# Patient Record
Sex: FEMALE | Race: WHITE | NOT HISPANIC OR LATINO | Employment: FULL TIME | URBAN - METROPOLITAN AREA
[De-identification: names, ages, dates, MRNs, and addresses within clinical notes are randomized per-mention and may not be internally consistent; named-entity substitution may affect disease eponyms.]

---

## 2017-01-25 ENCOUNTER — GENERIC CONVERSION - ENCOUNTER (OUTPATIENT)
Dept: OTHER | Facility: OTHER | Age: 47
End: 2017-01-25

## 2017-02-10 ENCOUNTER — GENERIC CONVERSION - ENCOUNTER (OUTPATIENT)
Dept: OTHER | Facility: OTHER | Age: 47
End: 2017-02-10

## 2017-06-24 ENCOUNTER — APPOINTMENT (OUTPATIENT)
Dept: LAB | Facility: HOSPITAL | Age: 47
End: 2017-06-24
Payer: COMMERCIAL

## 2017-06-24 ENCOUNTER — TRANSCRIBE ORDERS (OUTPATIENT)
Dept: LAB | Facility: HOSPITAL | Age: 47
End: 2017-06-24

## 2017-06-24 DIAGNOSIS — Z00.8 HEALTH EXAMINATION IN POPULATION SURVEY: Primary | ICD-10-CM

## 2017-06-24 DIAGNOSIS — Z00.8 HEALTH EXAMINATION IN POPULATION SURVEY: ICD-10-CM

## 2017-06-24 LAB
CHOLEST SERPL-MCNC: 234 MG/DL (ref 50–200)
EST. AVERAGE GLUCOSE BLD GHB EST-MCNC: 114 MG/DL
HBA1C MFR BLD: 5.6 % (ref 4.2–6.3)
HDLC SERPL-MCNC: 90 MG/DL (ref 40–60)
LDLC SERPL CALC-MCNC: 131 MG/DL (ref 0–100)
TRIGL SERPL-MCNC: 66 MG/DL

## 2017-06-24 PROCEDURE — 80061 LIPID PANEL: CPT

## 2017-06-24 PROCEDURE — 83036 HEMOGLOBIN GLYCOSYLATED A1C: CPT

## 2017-06-24 PROCEDURE — 36415 COLL VENOUS BLD VENIPUNCTURE: CPT

## 2017-07-17 ENCOUNTER — OFFICE VISIT (OUTPATIENT)
Dept: URGENT CARE | Facility: MEDICAL CENTER | Age: 47
End: 2017-07-17
Payer: COMMERCIAL

## 2017-07-17 DIAGNOSIS — R10.9 ABDOMINAL PAIN: ICD-10-CM

## 2017-07-17 PROCEDURE — S9088 SERVICES PROVIDED IN URGENT: HCPCS

## 2017-07-17 PROCEDURE — 99213 OFFICE O/P EST LOW 20 MIN: CPT

## 2017-07-17 PROCEDURE — 96372 THER/PROPH/DIAG INJ SC/IM: CPT

## 2017-07-17 PROCEDURE — 81002 URINALYSIS NONAUTO W/O SCOPE: CPT

## 2017-07-18 ENCOUNTER — APPOINTMENT (OUTPATIENT)
Dept: LAB | Facility: HOSPITAL | Age: 47
End: 2017-07-18
Payer: COMMERCIAL

## 2017-07-18 DIAGNOSIS — R10.9 ABDOMINAL PAIN: ICD-10-CM

## 2017-07-18 PROCEDURE — 87086 URINE CULTURE/COLONY COUNT: CPT

## 2017-07-19 LAB — BACTERIA UR CULT: NORMAL

## 2017-09-01 ENCOUNTER — ALLSCRIPTS OFFICE VISIT (OUTPATIENT)
Dept: OTHER | Facility: OTHER | Age: 47
End: 2017-09-01

## 2017-09-28 DIAGNOSIS — Z12.31 ENCOUNTER FOR SCREENING MAMMOGRAM FOR MALIGNANT NEOPLASM OF BREAST: ICD-10-CM

## 2017-09-28 DIAGNOSIS — N20.0 CALCULUS OF KIDNEY: ICD-10-CM

## 2017-10-02 ENCOUNTER — TRANSCRIBE ORDERS (OUTPATIENT)
Dept: ADMINISTRATIVE | Facility: HOSPITAL | Age: 47
End: 2017-10-02

## 2017-10-02 DIAGNOSIS — Z12.31 VISIT FOR SCREENING MAMMOGRAM: Primary | ICD-10-CM

## 2017-10-20 ENCOUNTER — TRANSCRIBE ORDERS (OUTPATIENT)
Dept: ADMINISTRATIVE | Facility: HOSPITAL | Age: 47
End: 2017-10-20

## 2017-10-20 DIAGNOSIS — N20.0 URIC ACID NEPHROLITHIASIS: Primary | ICD-10-CM

## 2017-11-08 ENCOUNTER — HOSPITAL ENCOUNTER (OUTPATIENT)
Dept: RADIOLOGY | Facility: HOSPITAL | Age: 47
Discharge: HOME | End: 2017-11-08
Payer: COMMERCIAL

## 2017-11-08 DIAGNOSIS — Z12.31 ENCOUNTER FOR SCREENING MAMMOGRAM FOR MALIGNANT NEOPLASM OF BREAST: ICD-10-CM

## 2017-11-08 PROCEDURE — G0202 SCR MAMMO BI INCL CAD: HCPCS

## 2017-11-09 ENCOUNTER — GENERIC CONVERSION - ENCOUNTER (OUTPATIENT)
Dept: OTHER | Facility: OTHER | Age: 47
End: 2017-11-09

## 2017-11-11 ENCOUNTER — HOSPITAL ENCOUNTER (OUTPATIENT)
Dept: RADIOLOGY | Facility: HOSPITAL | Age: 47
Discharge: HOME | End: 2017-11-11
Attending: UROLOGY
Payer: COMMERCIAL

## 2017-11-11 ENCOUNTER — TRANSCRIBE ORDERS (OUTPATIENT)
Dept: RADIOLOGY | Facility: HOSPITAL | Age: 47
End: 2017-11-11

## 2017-11-11 DIAGNOSIS — N20.0 CALCULUS OF KIDNEY: ICD-10-CM

## 2017-11-11 PROCEDURE — 74000 HB X-RAY EXAM OF ABDOMEN (SINGLE ANTEROPOSTERIOR VIEW): CPT

## 2017-11-11 PROCEDURE — 76770 US EXAM ABDO BACK WALL COMP: CPT

## 2017-11-30 ENCOUNTER — ALLSCRIPTS OFFICE VISIT (OUTPATIENT)
Dept: OTHER | Facility: OTHER | Age: 47
End: 2017-11-30

## 2017-12-30 ENCOUNTER — APPOINTMENT (OUTPATIENT)
Dept: LAB | Facility: HOSPITAL | Age: 47
End: 2017-12-30
Payer: COMMERCIAL

## 2017-12-30 ENCOUNTER — TRANSCRIBE ORDERS (OUTPATIENT)
Dept: LAB | Facility: HOSPITAL | Age: 47
End: 2017-12-30

## 2017-12-30 DIAGNOSIS — N20.0 CALCULUS OF KIDNEY: ICD-10-CM

## 2017-12-30 LAB
ANION GAP SERPL CALCULATED.3IONS-SCNC: 6 MMOL/L (ref 4–13)
BUN SERPL-MCNC: 20 MG/DL (ref 5–25)
CALCIUM SERPL-MCNC: 8.7 MG/DL (ref 8.3–10.1)
CHLORIDE SERPL-SCNC: 108 MMOL/L (ref 100–108)
CO2 SERPL-SCNC: 26 MMOL/L (ref 21–32)
CREAT SERPL-MCNC: 0.8 MG/DL (ref 0.6–1.3)
GFR SERPL CREATININE-BSD FRML MDRD: 87 ML/MIN/1.73SQ M
GLUCOSE SERPL-MCNC: 80 MG/DL (ref 65–140)
POTASSIUM SERPL-SCNC: 4 MMOL/L (ref 3.5–5.3)
SODIUM SERPL-SCNC: 140 MMOL/L (ref 136–145)

## 2017-12-30 PROCEDURE — 80048 BASIC METABOLIC PNL TOTAL CA: CPT

## 2017-12-30 PROCEDURE — 36415 COLL VENOUS BLD VENIPUNCTURE: CPT

## 2018-01-05 ENCOUNTER — GENERIC CONVERSION - ENCOUNTER (OUTPATIENT)
Dept: INTERNAL MEDICINE CLINIC | Facility: CLINIC | Age: 48
End: 2018-01-05

## 2018-01-06 ENCOUNTER — APPOINTMENT (OUTPATIENT)
Dept: LAB | Facility: HOSPITAL | Age: 48
End: 2018-01-06
Payer: COMMERCIAL

## 2018-01-06 ENCOUNTER — TRANSCRIBE ORDERS (OUTPATIENT)
Dept: LAB | Facility: HOSPITAL | Age: 48
End: 2018-01-06

## 2018-01-06 DIAGNOSIS — Z79.899 NEED FOR PROPHYLACTIC CHEMOTHERAPY: ICD-10-CM

## 2018-01-06 DIAGNOSIS — L20.81 DIFFUSE NEURODERMATITIS OF BROCQ: ICD-10-CM

## 2018-01-06 DIAGNOSIS — L20.81 DIFFUSE NEURODERMATITIS OF BROCQ: Primary | ICD-10-CM

## 2018-01-06 LAB
ALBUMIN SERPL BCP-MCNC: 3.9 G/DL (ref 3.5–5)
ALP SERPL-CCNC: 46 U/L (ref 46–116)
ALT SERPL W P-5'-P-CCNC: 17 U/L (ref 12–78)
ANION GAP SERPL CALCULATED.3IONS-SCNC: 6 MMOL/L (ref 4–13)
AST SERPL W P-5'-P-CCNC: 17 U/L (ref 5–45)
BASOPHILS # BLD AUTO: 0.03 THOUSANDS/ΜL (ref 0–0.1)
BASOPHILS NFR BLD AUTO: 0 % (ref 0–1)
BILIRUB SERPL-MCNC: 0.48 MG/DL (ref 0.2–1)
BUN SERPL-MCNC: 14 MG/DL (ref 5–25)
CALCIUM SERPL-MCNC: 9.2 MG/DL (ref 8.3–10.1)
CHLORIDE SERPL-SCNC: 108 MMOL/L (ref 100–108)
CHOLEST SERPL-MCNC: 270 MG/DL (ref 50–200)
CO2 SERPL-SCNC: 27 MMOL/L (ref 21–32)
CREAT SERPL-MCNC: 0.72 MG/DL (ref 0.6–1.3)
EOSINOPHIL # BLD AUTO: 0.14 THOUSAND/ΜL (ref 0–0.61)
EOSINOPHIL NFR BLD AUTO: 1 % (ref 0–6)
ERYTHROCYTE [DISTWIDTH] IN BLOOD BY AUTOMATED COUNT: 12.6 % (ref 11.6–15.1)
GFR SERPL CREATININE-BSD FRML MDRD: 99 ML/MIN/1.73SQ M
GLUCOSE P FAST SERPL-MCNC: 87 MG/DL (ref 65–99)
HCT VFR BLD AUTO: 41.7 % (ref 34.8–46.1)
HDLC SERPL-MCNC: 105 MG/DL (ref 40–60)
HGB BLD-MCNC: 13.6 G/DL (ref 11.5–15.4)
LDLC SERPL CALC-MCNC: 156 MG/DL (ref 0–100)
LYMPHOCYTES # BLD AUTO: 1.94 THOUSANDS/ΜL (ref 0.6–4.47)
LYMPHOCYTES NFR BLD AUTO: 18 % (ref 14–44)
MAGNESIUM SERPL-MCNC: 2.2 MG/DL (ref 1.6–2.6)
MCH RBC QN AUTO: 32.5 PG (ref 26.8–34.3)
MCHC RBC AUTO-ENTMCNC: 32.6 G/DL (ref 31.4–37.4)
MCV RBC AUTO: 100 FL (ref 82–98)
MONOCYTES # BLD AUTO: 0.81 THOUSAND/ΜL (ref 0.17–1.22)
MONOCYTES NFR BLD AUTO: 7 % (ref 4–12)
NEUTROPHILS # BLD AUTO: 8.12 THOUSANDS/ΜL (ref 1.85–7.62)
NEUTS SEG NFR BLD AUTO: 74 % (ref 43–75)
NRBC BLD AUTO-RTO: 0 /100 WBCS
PLATELET # BLD AUTO: 307 THOUSANDS/UL (ref 149–390)
PMV BLD AUTO: 10.1 FL (ref 8.9–12.7)
POTASSIUM SERPL-SCNC: 4.2 MMOL/L (ref 3.5–5.3)
PROT SERPL-MCNC: 7.5 G/DL (ref 6.4–8.2)
RBC # BLD AUTO: 4.18 MILLION/UL (ref 3.81–5.12)
SODIUM SERPL-SCNC: 141 MMOL/L (ref 136–145)
TRIGL SERPL-MCNC: 43 MG/DL
URATE SERPL-MCNC: 2.4 MG/DL (ref 2–6.8)
WBC # BLD AUTO: 11.06 THOUSAND/UL (ref 4.31–10.16)

## 2018-01-06 PROCEDURE — 84550 ASSAY OF BLOOD/URIC ACID: CPT

## 2018-01-06 PROCEDURE — 85025 COMPLETE CBC W/AUTO DIFF WBC: CPT

## 2018-01-06 PROCEDURE — 80053 COMPREHEN METABOLIC PANEL: CPT

## 2018-01-06 PROCEDURE — 80061 LIPID PANEL: CPT

## 2018-01-06 PROCEDURE — 36415 COLL VENOUS BLD VENIPUNCTURE: CPT

## 2018-01-06 PROCEDURE — 83735 ASSAY OF MAGNESIUM: CPT

## 2018-01-12 VITALS
DIASTOLIC BLOOD PRESSURE: 78 MMHG | RESPIRATION RATE: 18 BRPM | HEART RATE: 80 BPM | OXYGEN SATURATION: 98 % | SYSTOLIC BLOOD PRESSURE: 112 MMHG

## 2018-01-13 VITALS
BODY MASS INDEX: 25.11 KG/M2 | HEIGHT: 66 IN | DIASTOLIC BLOOD PRESSURE: 80 MMHG | SYSTOLIC BLOOD PRESSURE: 122 MMHG | WEIGHT: 156.25 LBS

## 2018-01-16 NOTE — RESULT NOTES
Message   Notified the patient mammogram negative, see OB/GYN for routine examination follow up as scheduled        Verified Results  [facility] SCREENING BILATERAL W CAD [id number] 10:52AM Almaz Briones Order Number: [id number]     Test Name Result Flag Reference   MAMMO SCREENING BILATERAL W CAD (Report)     Patient History:   Family history of breast cancer in maternal cousin at age 35 and    prostate cancer in maternal uncle at age 77  Patient is a former smoker  Patient's BMI is 23 9  Reason for exam: screening (asymptomatic)  Mammo Screening Bilateral W CAD: July 25, 2016 - Check In #:    [de-identified]   Bilateral CC and MLO view(s) were taken  Technologist: RT Earnestine(R)(M)   Prior study comparison: June 25, 2015, bilateral digital    screening mammogram, performed at [place] [address]  June 9, 2014, bilateral digital screening mammogram    performed at [address] [place] [address]  June 4, 2013,    bilateral digital screening mammogram performed at [address] [place] [address]  May 25, 2012, bilateral digital screening    mammogram performed at [address] [place] [address]  May 20,    2011, bilateral digital screening mammogram performed at [address] [place] [address]  May 17, 2010, bilateral digital    screening mammogram performed at [address] [place] [address]      There are scattered fibroglandular densities  No dominant soft tissue mass, architectural distortion or    suspicious calcifications are noted  The skin and nipple    contours are within normal limits  No evidence of malignancy  No significant changes   when compared with prior studies  ASSESSMENT: BiRad:1 - Negative     Recommendation:   Routine screening mammogram of both breasts in 1 year  A reminder letter will be scheduled  Analyzed by CAD     8-10% of cancers will be missed on mammography   Management of a    palpable abnormality must be based on clinical grounds  Patients   will be notified of their results via letter from our facility  Accredited by [employer] and FDA       Transcription Location: [facility] 98: [id number]     Risk Value(s):   Tyrer-Cuzick 10 Year: 3 017%, Tyrer-Cuzick Lifetime: 15 343%,    Myriad Table: 2 6%, DUY 5 Year: 1 1%, NCI Lifetime: 11 8%   Signed by:   Jenaro Willams MD   7/25/16       Signatures   Electronically signed by : Sarah Chandler DO; Jul 26 2016  7:05PM EST                       (Author)

## 2018-01-18 NOTE — RESULT NOTES
Message   Notify the patient mammogram - no mammographic evidence of malignancy - negative follow-up as scheduled and see OBGYN for routine examination        Verified Results  * MAMMO SCREENING BILATERAL W CAD [id number] 01:02PM Avani Bansal Order Number: [id number]    - Patient Instructions: To schedule this appointment, please contact Central Scheduling at [phone]  Do not wear any perfume, powder, lotion or deodorant on breast or underarm area  Please bring your doctors order, referral (if needed) and insurance information with you on the day of the test  Failure to bring this information may result in this test being rescheduled  Arrive 15 minutes prior to your appointment time to register  On the day of your test, please bring any prior mammogram or breast studies with you that were not performed at a [facility]  Failure to bring prior exams may result in your test needing to be rescheduled  Test Name Result Flag Reference   MAMMO SCREENING BILATERAL W CAD (Report)     Patient History:   Family history of prostate cancer at age 77 in maternal uncle,    breast cancer at age 35 in maternal cousin  Patient is a former smoker  Patient's BMI is 23 9  Reason for exam: screening, asymptomatic  Mammo Screening Bilateral W CAD: November 8, 2017 - Check In #:    [de-identified]   Bilateral CC and MLO view(s) were taken  Technologist: RT Mahamed(R)(M)   Prior study comparison: July 25, 2016, mammo screening bilateral    W CAD performed at [address] [place] [address]  June 25, 2015, bilateral digital screening mammogram, performed at [address] [place] [address]  June 9, 2014, bilateral    digital screening mammogram performed at [address] [place] [address]      The breast tissue is heterogeneously dense, potentially limiting    the sensitivity of mammography   Patient risk, included in this    report, assists in determining the appropriate screening regimen    (such as 3-D mammography or the inclusion of automated breast    ultrasound or MRI)  3-D mammography may also remain indicated as    screening  The parenchymal pattern appears stable  No dominant soft tissue    mass or suspicious calcifications are noted  The skin and nipple   contours are within normal limits  No mammographic evidence of malignancy  No    significant changes when compared with prior studies  ACR BI-RADSï¾® Assessments: BiRad:1 - Negative     Recommendation:   Routine screening mammogram in 1 year  Analyzed by CAD     The patient is scheduled in a reminder system for screening    mammography  8-10% of cancers will be missed on mammography  Management of a    palpable abnormality must be based on clinical grounds  Patients   will be notified of their results via letter from our facility  Accredited by [employer] and FDA       Transcription Location: [facility] 98: [id number]     Risk Value(s):   Tyrer-Cuzick 10 Year: 3 200%, Tyrer-Cuzick Lifetime: 14 600%,    Myriad Table: 2 6%, DUY 5 Year: 1 1%, NCI Lifetime: 11 4%   Signed by:   Tiffanie Tariq MD   11/9/17

## 2018-02-04 ENCOUNTER — TRANSCRIBE ORDERS (OUTPATIENT)
Dept: LAB | Facility: HOSPITAL | Age: 48
End: 2018-02-04

## 2018-02-04 ENCOUNTER — APPOINTMENT (OUTPATIENT)
Dept: LAB | Facility: HOSPITAL | Age: 48
End: 2018-02-04
Payer: COMMERCIAL

## 2018-02-04 DIAGNOSIS — L20.81 DIFFUSE NEURODERMATITIS OF BROCQ: Primary | ICD-10-CM

## 2018-02-04 DIAGNOSIS — Z79.899 NEED FOR PROPHYLACTIC CHEMOTHERAPY: ICD-10-CM

## 2018-02-04 DIAGNOSIS — L20.81 DIFFUSE NEURODERMATITIS OF BROCQ: ICD-10-CM

## 2018-02-04 LAB
ALBUMIN SERPL BCP-MCNC: 3.8 G/DL (ref 3.5–5)
ALP SERPL-CCNC: 42 U/L (ref 46–116)
ALT SERPL W P-5'-P-CCNC: 15 U/L (ref 12–78)
ANION GAP SERPL CALCULATED.3IONS-SCNC: 7 MMOL/L (ref 4–13)
AST SERPL W P-5'-P-CCNC: 19 U/L (ref 5–45)
BASOPHILS # BLD AUTO: 0.04 THOUSANDS/ΜL (ref 0–0.1)
BASOPHILS NFR BLD AUTO: 1 % (ref 0–1)
BILIRUB SERPL-MCNC: 0.8 MG/DL (ref 0.2–1)
BUN SERPL-MCNC: 14 MG/DL (ref 5–25)
CALCIUM SERPL-MCNC: 8.8 MG/DL (ref 8.3–10.1)
CHLORIDE SERPL-SCNC: 109 MMOL/L (ref 100–108)
CHOLEST SERPL-MCNC: 272 MG/DL (ref 50–200)
CO2 SERPL-SCNC: 23 MMOL/L (ref 21–32)
CREAT SERPL-MCNC: 0.79 MG/DL (ref 0.6–1.3)
EOSINOPHIL # BLD AUTO: 0.21 THOUSAND/ΜL (ref 0–0.61)
EOSINOPHIL NFR BLD AUTO: 4 % (ref 0–6)
ERYTHROCYTE [DISTWIDTH] IN BLOOD BY AUTOMATED COUNT: 12.6 % (ref 11.6–15.1)
GFR SERPL CREATININE-BSD FRML MDRD: 89 ML/MIN/1.73SQ M
GLUCOSE P FAST SERPL-MCNC: 90 MG/DL (ref 65–99)
HCT VFR BLD AUTO: 41.1 % (ref 34.8–46.1)
HDLC SERPL-MCNC: 93 MG/DL (ref 40–60)
HGB BLD-MCNC: 13.5 G/DL (ref 11.5–15.4)
LDLC SERPL CALC-MCNC: 165 MG/DL (ref 0–100)
LYMPHOCYTES # BLD AUTO: 1.49 THOUSANDS/ΜL (ref 0.6–4.47)
LYMPHOCYTES NFR BLD AUTO: 28 % (ref 14–44)
MAGNESIUM SERPL-MCNC: 2 MG/DL (ref 1.6–2.6)
MCH RBC QN AUTO: 32.8 PG (ref 26.8–34.3)
MCHC RBC AUTO-ENTMCNC: 32.8 G/DL (ref 31.4–37.4)
MCV RBC AUTO: 100 FL (ref 82–98)
MONOCYTES # BLD AUTO: 0.35 THOUSAND/ΜL (ref 0.17–1.22)
MONOCYTES NFR BLD AUTO: 7 % (ref 4–12)
NEUTROPHILS # BLD AUTO: 3.18 THOUSANDS/ΜL (ref 1.85–7.62)
NEUTS SEG NFR BLD AUTO: 60 % (ref 43–75)
NRBC BLD AUTO-RTO: 0 /100 WBCS
PLATELET # BLD AUTO: 292 THOUSANDS/UL (ref 149–390)
PMV BLD AUTO: 10.2 FL (ref 8.9–12.7)
POTASSIUM SERPL-SCNC: 4.2 MMOL/L (ref 3.5–5.3)
PROT SERPL-MCNC: 7.1 G/DL (ref 6.4–8.2)
RBC # BLD AUTO: 4.12 MILLION/UL (ref 3.81–5.12)
SODIUM SERPL-SCNC: 139 MMOL/L (ref 136–145)
TRIGL SERPL-MCNC: 68 MG/DL
URATE SERPL-MCNC: 3.3 MG/DL (ref 2–6.8)
WBC # BLD AUTO: 5.29 THOUSAND/UL (ref 4.31–10.16)

## 2018-02-04 PROCEDURE — 85025 COMPLETE CBC W/AUTO DIFF WBC: CPT

## 2018-02-04 PROCEDURE — 36415 COLL VENOUS BLD VENIPUNCTURE: CPT

## 2018-02-04 PROCEDURE — 84550 ASSAY OF BLOOD/URIC ACID: CPT

## 2018-02-04 PROCEDURE — 83735 ASSAY OF MAGNESIUM: CPT

## 2018-02-04 PROCEDURE — 80061 LIPID PANEL: CPT

## 2018-02-04 PROCEDURE — 80053 COMPREHEN METABOLIC PANEL: CPT

## 2018-02-06 DIAGNOSIS — B00.9 HSV-1 (HERPES SIMPLEX VIRUS 1) INFECTION: Primary | ICD-10-CM

## 2018-02-07 RX ORDER — ACYCLOVIR 400 MG/1
1 TABLET ORAL 3 TIMES DAILY PRN
COMMUNITY
Start: 2012-10-10 | End: 2018-02-07 | Stop reason: SDUPTHER

## 2018-02-07 RX ORDER — ACYCLOVIR 400 MG/1
400 TABLET ORAL 3 TIMES DAILY PRN
Qty: 270 TABLET | Refills: 0 | Status: SHIPPED | OUTPATIENT
Start: 2018-02-07 | End: 2018-09-26 | Stop reason: ALTCHOICE

## 2018-02-13 ENCOUNTER — TELEPHONE (OUTPATIENT)
Dept: INTERNAL MEDICINE CLINIC | Facility: CLINIC | Age: 48
End: 2018-02-13

## 2018-02-13 RX ORDER — FLUTICASONE PROPIONATE 50 MCG
2 SPRAY, SUSPENSION (ML) NASAL DAILY
COMMUNITY
Start: 2014-04-15

## 2018-02-13 RX ORDER — ALPRAZOLAM 0.25 MG/1
1 TABLET ORAL 3 TIMES DAILY PRN
COMMUNITY
Start: 2017-09-01 | End: 2018-10-09 | Stop reason: SDUPTHER

## 2018-02-13 RX ORDER — ACETAMINOPHEN 160 MG
2 TABLET,DISINTEGRATING ORAL DAILY
COMMUNITY

## 2018-02-13 RX ORDER — CITALOPRAM 40 MG/1
1 TABLET ORAL DAILY
COMMUNITY
Start: 2017-03-22 | End: 2018-03-24 | Stop reason: SDUPTHER

## 2018-02-13 RX ORDER — TOPIRAMATE 100 MG/1
1 TABLET, FILM COATED ORAL
COMMUNITY
Start: 2013-03-11 | End: 2018-03-31 | Stop reason: SDUPTHER

## 2018-02-14 NOTE — TELEPHONE ENCOUNTER
Spoke to patient and the pharmacy regarding this medication  The patient has been getting Acyclovir 400mg TID from us until just recently  She was prescribed Valcyclovir 400mg one daily according to the pharmacist  I left a message for the patient to confirm this and advised her to call back to let us know that this prescription was filled by Dr Blanca Schrader and if she wants to change it then to [facility]

## 2018-02-18 ENCOUNTER — APPOINTMENT (OUTPATIENT)
Dept: LAB | Facility: HOSPITAL | Age: 48
End: 2018-02-18
Payer: COMMERCIAL

## 2018-02-18 ENCOUNTER — TRANSCRIBE ORDERS (OUTPATIENT)
Dept: LAB | Facility: HOSPITAL | Age: 48
End: 2018-02-18

## 2018-02-18 DIAGNOSIS — L20.89 OTHER ATOPIC DERMATITIS: Primary | ICD-10-CM

## 2018-02-18 DIAGNOSIS — Z79.899 LONG TERM USE OF DRUG: ICD-10-CM

## 2018-02-18 LAB
ALBUMIN SERPL BCP-MCNC: 3.8 G/DL (ref 3.5–5)
ALP SERPL-CCNC: 43 U/L (ref 46–116)
ALT SERPL W P-5'-P-CCNC: 11 U/L (ref 12–78)
ANION GAP SERPL CALCULATED.3IONS-SCNC: 7 MMOL/L (ref 4–13)
AST SERPL W P-5'-P-CCNC: 18 U/L (ref 5–45)
BASOPHILS # BLD AUTO: 0.05 THOUSANDS/ΜL (ref 0–0.1)
BASOPHILS NFR BLD AUTO: 1 % (ref 0–1)
BILIRUB SERPL-MCNC: 1.11 MG/DL (ref 0.2–1)
BUN SERPL-MCNC: 20 MG/DL (ref 5–25)
CALCIUM SERPL-MCNC: 8.7 MG/DL (ref 8.3–10.1)
CHLORIDE SERPL-SCNC: 109 MMOL/L (ref 100–108)
CHOLEST SERPL-MCNC: 261 MG/DL (ref 50–200)
CO2 SERPL-SCNC: 25 MMOL/L (ref 21–32)
CREAT SERPL-MCNC: 0.96 MG/DL (ref 0.6–1.3)
EOSINOPHIL # BLD AUTO: 0.13 THOUSAND/ΜL (ref 0–0.61)
EOSINOPHIL NFR BLD AUTO: 3 % (ref 0–6)
ERYTHROCYTE [DISTWIDTH] IN BLOOD BY AUTOMATED COUNT: 12.5 % (ref 11.6–15.1)
GFR SERPL CREATININE-BSD FRML MDRD: 70 ML/MIN/1.73SQ M
GLUCOSE P FAST SERPL-MCNC: 84 MG/DL (ref 65–99)
HCT VFR BLD AUTO: 40.6 % (ref 34.8–46.1)
HDLC SERPL-MCNC: 83 MG/DL (ref 40–60)
HGB BLD-MCNC: 13.8 G/DL (ref 11.5–15.4)
LDLC SERPL CALC-MCNC: 165 MG/DL (ref 0–100)
LYMPHOCYTES # BLD AUTO: 1.58 THOUSANDS/ΜL (ref 0.6–4.47)
LYMPHOCYTES NFR BLD AUTO: 32 % (ref 14–44)
MCH RBC QN AUTO: 33 PG (ref 26.8–34.3)
MCHC RBC AUTO-ENTMCNC: 34 G/DL (ref 31.4–37.4)
MCV RBC AUTO: 97 FL (ref 82–98)
MONOCYTES # BLD AUTO: 0.48 THOUSAND/ΜL (ref 0.17–1.22)
MONOCYTES NFR BLD AUTO: 10 % (ref 4–12)
NEUTROPHILS # BLD AUTO: 2.62 THOUSANDS/ΜL (ref 1.85–7.62)
NEUTS SEG NFR BLD AUTO: 54 % (ref 43–75)
NRBC BLD AUTO-RTO: 0 /100 WBCS
PLATELET # BLD AUTO: 291 THOUSANDS/UL (ref 149–390)
PMV BLD AUTO: 10.8 FL (ref 8.9–12.7)
POTASSIUM SERPL-SCNC: 4.3 MMOL/L (ref 3.5–5.3)
PROT SERPL-MCNC: 7.4 G/DL (ref 6.4–8.2)
RBC # BLD AUTO: 4.18 MILLION/UL (ref 3.81–5.12)
SODIUM SERPL-SCNC: 141 MMOL/L (ref 136–145)
TRIGL SERPL-MCNC: 63 MG/DL
WBC # BLD AUTO: 4.87 THOUSAND/UL (ref 4.31–10.16)

## 2018-02-18 PROCEDURE — 80053 COMPREHEN METABOLIC PANEL: CPT

## 2018-02-18 PROCEDURE — 36415 COLL VENOUS BLD VENIPUNCTURE: CPT

## 2018-02-18 PROCEDURE — 80061 LIPID PANEL: CPT

## 2018-02-18 PROCEDURE — 85025 COMPLETE CBC W/AUTO DIFF WBC: CPT

## 2018-03-06 ENCOUNTER — APPOINTMENT (OUTPATIENT)
Dept: LAB | Facility: HOSPITAL | Age: 48
End: 2018-03-06
Payer: COMMERCIAL

## 2018-03-06 ENCOUNTER — TRANSCRIBE ORDERS (OUTPATIENT)
Dept: LAB | Facility: HOSPITAL | Age: 48
End: 2018-03-06

## 2018-03-06 DIAGNOSIS — Z79.899 NEED FOR PROPHYLACTIC CHEMOTHERAPY: ICD-10-CM

## 2018-03-06 DIAGNOSIS — L20.81 DIFFUSE NEURODERMATITIS OF BROCQ: Primary | ICD-10-CM

## 2018-03-06 DIAGNOSIS — L20.81 DIFFUSE NEURODERMATITIS OF BROCQ: ICD-10-CM

## 2018-03-06 LAB
ALBUMIN SERPL BCP-MCNC: 4 G/DL (ref 3.5–5)
ALP SERPL-CCNC: 47 U/L (ref 46–116)
ALT SERPL W P-5'-P-CCNC: 13 U/L (ref 12–78)
ANION GAP SERPL CALCULATED.3IONS-SCNC: 9 MMOL/L (ref 4–13)
AST SERPL W P-5'-P-CCNC: 16 U/L (ref 5–45)
BASOPHILS # BLD AUTO: 0.06 THOUSANDS/ΜL (ref 0–0.1)
BASOPHILS NFR BLD AUTO: 1 % (ref 0–1)
BILIRUB SERPL-MCNC: 0.7 MG/DL (ref 0.2–1)
BUN SERPL-MCNC: 17 MG/DL (ref 5–25)
CALCIUM SERPL-MCNC: 9.4 MG/DL (ref 8.3–10.1)
CHLORIDE SERPL-SCNC: 110 MMOL/L (ref 100–108)
CHOLEST SERPL-MCNC: 262 MG/DL (ref 50–200)
CO2 SERPL-SCNC: 22 MMOL/L (ref 21–32)
CREAT SERPL-MCNC: 0.87 MG/DL (ref 0.6–1.3)
EOSINOPHIL # BLD AUTO: 0.21 THOUSAND/ΜL (ref 0–0.61)
EOSINOPHIL NFR BLD AUTO: 3 % (ref 0–6)
ERYTHROCYTE [DISTWIDTH] IN BLOOD BY AUTOMATED COUNT: 12.8 % (ref 11.6–15.1)
GFR SERPL CREATININE-BSD FRML MDRD: 79 ML/MIN/1.73SQ M
GLUCOSE P FAST SERPL-MCNC: 99 MG/DL (ref 65–99)
HCT VFR BLD AUTO: 42.9 % (ref 34.8–46.1)
HDLC SERPL-MCNC: 84 MG/DL (ref 40–60)
HGB BLD-MCNC: 14.1 G/DL (ref 11.5–15.4)
LDLC SERPL CALC-MCNC: 163 MG/DL (ref 0–100)
LYMPHOCYTES # BLD AUTO: 1.72 THOUSANDS/ΜL (ref 0.6–4.47)
LYMPHOCYTES NFR BLD AUTO: 25 % (ref 14–44)
MAGNESIUM SERPL-MCNC: 2.2 MG/DL (ref 1.6–2.6)
MCH RBC QN AUTO: 32.8 PG (ref 26.8–34.3)
MCHC RBC AUTO-ENTMCNC: 32.9 G/DL (ref 31.4–37.4)
MCV RBC AUTO: 100 FL (ref 82–98)
MONOCYTES # BLD AUTO: 0.83 THOUSAND/ΜL (ref 0.17–1.22)
MONOCYTES NFR BLD AUTO: 12 % (ref 4–12)
NEUTROPHILS # BLD AUTO: 4.02 THOUSANDS/ΜL (ref 1.85–7.62)
NEUTS SEG NFR BLD AUTO: 59 % (ref 43–75)
NRBC BLD AUTO-RTO: 0 /100 WBCS
PLATELET # BLD AUTO: 285 THOUSANDS/UL (ref 149–390)
PMV BLD AUTO: 10.5 FL (ref 8.9–12.7)
POTASSIUM SERPL-SCNC: 4.4 MMOL/L (ref 3.5–5.3)
PROT SERPL-MCNC: 7.6 G/DL (ref 6.4–8.2)
RBC # BLD AUTO: 4.3 MILLION/UL (ref 3.81–5.12)
SODIUM SERPL-SCNC: 141 MMOL/L (ref 136–145)
TRIGL SERPL-MCNC: 77 MG/DL
URATE SERPL-MCNC: 3.8 MG/DL (ref 2–6.8)
WBC # BLD AUTO: 6.86 THOUSAND/UL (ref 4.31–10.16)

## 2018-03-06 PROCEDURE — 85025 COMPLETE CBC W/AUTO DIFF WBC: CPT

## 2018-03-06 PROCEDURE — 84550 ASSAY OF BLOOD/URIC ACID: CPT

## 2018-03-06 PROCEDURE — 83735 ASSAY OF MAGNESIUM: CPT

## 2018-03-06 PROCEDURE — 80061 LIPID PANEL: CPT

## 2018-03-06 PROCEDURE — 36415 COLL VENOUS BLD VENIPUNCTURE: CPT

## 2018-03-06 PROCEDURE — 80053 COMPREHEN METABOLIC PANEL: CPT

## 2018-03-24 DIAGNOSIS — F32.A DEPRESSION, UNSPECIFIED DEPRESSION TYPE: Primary | ICD-10-CM

## 2018-03-24 RX ORDER — CITALOPRAM 40 MG/1
TABLET ORAL
Qty: 90 TABLET | Refills: 0 | Status: SHIPPED | OUTPATIENT
Start: 2018-03-24 | End: 2018-06-20 | Stop reason: SDUPTHER

## 2018-03-31 DIAGNOSIS — G43.809 OTHER MIGRAINE WITHOUT STATUS MIGRAINOSUS, NOT INTRACTABLE: Primary | ICD-10-CM

## 2018-04-01 RX ORDER — TOPIRAMATE 100 MG/1
TABLET, FILM COATED ORAL
Qty: 90 TABLET | Refills: 0 | Status: SHIPPED | OUTPATIENT
Start: 2018-04-01 | End: 2018-07-01 | Stop reason: SDUPTHER

## 2018-04-15 ENCOUNTER — APPOINTMENT (OUTPATIENT)
Dept: LAB | Facility: HOSPITAL | Age: 48
End: 2018-04-15
Payer: COMMERCIAL

## 2018-04-15 ENCOUNTER — TRANSCRIBE ORDERS (OUTPATIENT)
Dept: LAB | Facility: HOSPITAL | Age: 48
End: 2018-04-15

## 2018-04-15 DIAGNOSIS — Z79.899 ENCOUNTER FOR LONG-TERM (CURRENT) USE OF MEDICATIONS: Primary | ICD-10-CM

## 2018-04-15 DIAGNOSIS — Z79.899 ENCOUNTER FOR LONG-TERM (CURRENT) USE OF MEDICATIONS: ICD-10-CM

## 2018-04-15 DIAGNOSIS — L20.89 FLEXURAL ATOPIC DERMATITIS: ICD-10-CM

## 2018-04-15 LAB
ALBUMIN SERPL BCP-MCNC: 3.8 G/DL (ref 3.5–5)
ALP SERPL-CCNC: 47 U/L (ref 46–116)
ALT SERPL W P-5'-P-CCNC: 11 U/L (ref 12–78)
ANION GAP SERPL CALCULATED.3IONS-SCNC: 5 MMOL/L (ref 4–13)
AST SERPL W P-5'-P-CCNC: 14 U/L (ref 5–45)
BASOPHILS # BLD AUTO: 0.04 THOUSANDS/ΜL (ref 0–0.1)
BASOPHILS NFR BLD AUTO: 1 % (ref 0–1)
BILIRUB SERPL-MCNC: 1.05 MG/DL (ref 0.2–1)
BUN SERPL-MCNC: 19 MG/DL (ref 5–25)
CALCIUM SERPL-MCNC: 8.6 MG/DL
CHLORIDE SERPL-SCNC: 111 MMOL/L (ref 100–108)
CHOLEST SERPL-MCNC: 231 MG/DL (ref 50–200)
CO2 SERPL-SCNC: 25 MMOL/L (ref 21–32)
CREAT SERPL-MCNC: 0.87 MG/DL (ref 0.6–1.3)
EOSINOPHIL # BLD AUTO: 0.18 THOUSAND/ΜL (ref 0–0.61)
EOSINOPHIL NFR BLD AUTO: 3 % (ref 0–6)
ERYTHROCYTE [DISTWIDTH] IN BLOOD BY AUTOMATED COUNT: 12.7 % (ref 11.6–15.1)
GFR SERPL CREATININE-BSD FRML MDRD: 79 ML/MIN/1.73SQ M
GLUCOSE P FAST SERPL-MCNC: 91 MG/DL (ref 65–99)
HCT VFR BLD AUTO: 42.5 % (ref 34.8–46.1)
HDLC SERPL-MCNC: 80 MG/DL (ref 40–60)
HGB BLD-MCNC: 13.8 G/DL (ref 11.5–15.4)
LDLC SERPL CALC-MCNC: 139 MG/DL (ref 0–100)
LYMPHOCYTES # BLD AUTO: 1.36 THOUSANDS/ΜL (ref 0.6–4.47)
LYMPHOCYTES NFR BLD AUTO: 26 % (ref 14–44)
MAGNESIUM SERPL-MCNC: 2.1 MG/DL (ref 1.6–2.6)
MCH RBC QN AUTO: 32.9 PG (ref 26.8–34.3)
MCHC RBC AUTO-ENTMCNC: 32.5 G/DL (ref 31.4–37.4)
MCV RBC AUTO: 101 FL (ref 82–98)
MONOCYTES # BLD AUTO: 0.36 THOUSAND/ΜL (ref 0.17–1.22)
MONOCYTES NFR BLD AUTO: 7 % (ref 4–12)
NEUTROPHILS # BLD AUTO: 3.31 THOUSANDS/ΜL (ref 1.85–7.62)
NEUTS SEG NFR BLD AUTO: 63 % (ref 43–75)
NONHDLC SERPL-MCNC: 151 MG/DL
NRBC BLD AUTO-RTO: 0 /100 WBCS
PLATELET # BLD AUTO: 272 THOUSANDS/UL (ref 149–390)
PMV BLD AUTO: 10 FL (ref 8.9–12.7)
POTASSIUM SERPL-SCNC: 4.5 MMOL/L (ref 3.5–5.3)
PROT SERPL-MCNC: 7.2 G/DL (ref 6.4–8.2)
RBC # BLD AUTO: 4.19 MILLION/UL (ref 3.81–5.12)
SODIUM SERPL-SCNC: 141 MMOL/L (ref 136–145)
TRIGL SERPL-MCNC: 58 MG/DL
URATE SERPL-MCNC: 3.6 MG/DL (ref 2–6.8)
WBC # BLD AUTO: 5.26 THOUSAND/UL (ref 4.31–10.16)

## 2018-04-15 PROCEDURE — 84550 ASSAY OF BLOOD/URIC ACID: CPT

## 2018-04-15 PROCEDURE — 83735 ASSAY OF MAGNESIUM: CPT

## 2018-04-15 PROCEDURE — 80053 COMPREHEN METABOLIC PANEL: CPT

## 2018-04-15 PROCEDURE — 80061 LIPID PANEL: CPT

## 2018-04-15 PROCEDURE — 85025 COMPLETE CBC W/AUTO DIFF WBC: CPT

## 2018-04-15 PROCEDURE — 36415 COLL VENOUS BLD VENIPUNCTURE: CPT

## 2018-05-30 DIAGNOSIS — N20.0 CALCULUS OF KIDNEY: ICD-10-CM

## 2018-06-02 ENCOUNTER — APPOINTMENT (OUTPATIENT)
Dept: LAB | Facility: HOSPITAL | Age: 48
End: 2018-06-02
Payer: COMMERCIAL

## 2018-06-02 ENCOUNTER — TRANSCRIBE ORDERS (OUTPATIENT)
Dept: RADIOLOGY | Facility: HOSPITAL | Age: 48
End: 2018-06-02

## 2018-06-02 ENCOUNTER — HOSPITAL ENCOUNTER (OUTPATIENT)
Dept: RADIOLOGY | Facility: HOSPITAL | Age: 48
Discharge: HOME | End: 2018-06-02
Payer: COMMERCIAL

## 2018-06-02 DIAGNOSIS — Z00.8 HEALTH EXAMINATION IN POPULATION SURVEY: ICD-10-CM

## 2018-06-02 DIAGNOSIS — M25.50 PAIN IN JOINT, MULTIPLE SITES: Primary | ICD-10-CM

## 2018-06-02 DIAGNOSIS — M25.50 PAIN IN JOINT, MULTIPLE SITES: ICD-10-CM

## 2018-06-02 DIAGNOSIS — M54.5 LOW BACK PAIN, UNSPECIFIED BACK PAIN LATERALITY, UNSPECIFIED CHRONICITY, WITH SCIATICA PRESENCE UNSPECIFIED: ICD-10-CM

## 2018-06-02 DIAGNOSIS — N20.0 CALCULUS OF KIDNEY: ICD-10-CM

## 2018-06-02 LAB
25(OH)D3 SERPL-MCNC: 46.1 NG/ML (ref 30–100)
ALBUMIN SERPL BCP-MCNC: 4.3 G/DL (ref 3.5–5)
ALP SERPL-CCNC: 50 U/L (ref 46–116)
ALT SERPL W P-5'-P-CCNC: 14 U/L (ref 12–78)
ANION GAP SERPL CALCULATED.3IONS-SCNC: 6 MMOL/L (ref 4–13)
AST SERPL W P-5'-P-CCNC: 18 U/L (ref 5–45)
BASOPHILS # BLD AUTO: 0.06 THOUSANDS/ΜL (ref 0–0.1)
BASOPHILS NFR BLD AUTO: 1 % (ref 0–1)
BILIRUB SERPL-MCNC: 1.09 MG/DL (ref 0.2–1)
BILIRUB UR QL STRIP: NEGATIVE
BUN SERPL-MCNC: 14 MG/DL (ref 5–25)
C3 SERPL-MCNC: 93.6 MG/DL (ref 90–180)
C4 SERPL-MCNC: 19 MG/DL (ref 10–40)
CALCIUM SERPL-MCNC: 9.2 MG/DL (ref 8.3–10.1)
CHLORIDE SERPL-SCNC: 106 MMOL/L (ref 100–108)
CHOLEST SERPL-MCNC: 240 MG/DL (ref 50–200)
CK SERPL-CCNC: 81 U/L (ref 26–192)
CLARITY UR: NORMAL
CO2 SERPL-SCNC: 26 MMOL/L (ref 21–32)
COLOR UR: YELLOW
CREAT SERPL-MCNC: 0.86 MG/DL (ref 0.6–1.3)
CRP SERPL QL: <3 MG/L
EOSINOPHIL # BLD AUTO: 0.26 THOUSAND/ΜL (ref 0–0.61)
EOSINOPHIL NFR BLD AUTO: 3 % (ref 0–6)
ERYTHROCYTE [DISTWIDTH] IN BLOOD BY AUTOMATED COUNT: 12 % (ref 11.6–15.1)
ERYTHROCYTE [SEDIMENTATION RATE] IN BLOOD: 11 MM/HOUR (ref 0–20)
EST. AVERAGE GLUCOSE BLD GHB EST-MCNC: 111 MG/DL
GFR SERPL CREATININE-BSD FRML MDRD: 80 ML/MIN/1.73SQ M
GLUCOSE P FAST SERPL-MCNC: 83 MG/DL (ref 65–99)
GLUCOSE UR STRIP-MCNC: NEGATIVE MG/DL
HBA1C MFR BLD: 5.5 % (ref 4.2–6.3)
HBV SURFACE AG SER QL: NORMAL
HCT VFR BLD AUTO: 40.8 % (ref 34.8–46.1)
HDLC SERPL-MCNC: 94 MG/DL (ref 40–60)
HGB BLD-MCNC: 13.3 G/DL (ref 11.5–15.4)
HGB UR QL STRIP.AUTO: NEGATIVE
IMM GRANULOCYTES # BLD AUTO: 0.03 THOUSAND/UL (ref 0–0.2)
IMM GRANULOCYTES NFR BLD AUTO: 0 % (ref 0–2)
KETONES UR STRIP-MCNC: NEGATIVE MG/DL
LDLC SERPL CALC-MCNC: 135 MG/DL (ref 0–100)
LEUKOCYTE ESTERASE UR QL STRIP: NEGATIVE
LYMPHOCYTES # BLD AUTO: 1.77 THOUSANDS/ΜL (ref 0.6–4.47)
LYMPHOCYTES NFR BLD AUTO: 21 % (ref 14–44)
MCH RBC QN AUTO: 33.3 PG (ref 26.8–34.3)
MCHC RBC AUTO-ENTMCNC: 32.6 G/DL (ref 31.4–37.4)
MCV RBC AUTO: 102 FL (ref 82–98)
MONOCYTES # BLD AUTO: 0.68 THOUSAND/ΜL (ref 0.17–1.22)
MONOCYTES NFR BLD AUTO: 8 % (ref 4–12)
NEUTROPHILS # BLD AUTO: 5.81 THOUSANDS/ΜL (ref 1.85–7.62)
NEUTS SEG NFR BLD AUTO: 67 % (ref 43–75)
NITRITE UR QL STRIP: NEGATIVE
NONHDLC SERPL-MCNC: 146 MG/DL
NRBC BLD AUTO-RTO: 0 /100 WBCS
PH UR STRIP.AUTO: 6.5 [PH] (ref 4.5–8)
PLATELET # BLD AUTO: 272 THOUSANDS/UL (ref 149–390)
PMV BLD AUTO: 10.4 FL (ref 8.9–12.7)
POTASSIUM SERPL-SCNC: 3.6 MMOL/L (ref 3.5–5.3)
PROT SERPL-MCNC: 7.5 G/DL (ref 6.4–8.2)
PROT UR STRIP-MCNC: NEGATIVE MG/DL
RBC # BLD AUTO: 3.99 MILLION/UL (ref 3.81–5.12)
SODIUM SERPL-SCNC: 138 MMOL/L (ref 136–145)
SP GR UR STRIP.AUTO: 1 (ref 1–1.03)
TRIGL SERPL-MCNC: 55 MG/DL
TSH SERPL DL<=0.05 MIU/L-ACNC: 2.05 UIU/ML (ref 0.36–3.74)
UROBILINOGEN UR QL STRIP.AUTO: 0.2 E.U./DL
WBC # BLD AUTO: 8.61 THOUSAND/UL (ref 4.31–10.16)

## 2018-06-02 PROCEDURE — 86140 C-REACTIVE PROTEIN: CPT

## 2018-06-02 PROCEDURE — 82595 ASSAY OF CRYOGLOBULIN: CPT

## 2018-06-02 PROCEDURE — 83516 IMMUNOASSAY NONANTIBODY: CPT

## 2018-06-02 PROCEDURE — 85025 COMPLETE CBC W/AUTO DIFF WBC: CPT

## 2018-06-02 PROCEDURE — 74018 RADEX ABDOMEN 1 VIEW: CPT

## 2018-06-02 PROCEDURE — 86430 RHEUMATOID FACTOR TEST QUAL: CPT

## 2018-06-02 PROCEDURE — 84443 ASSAY THYROID STIM HORMONE: CPT

## 2018-06-02 PROCEDURE — 86225 DNA ANTIBODY NATIVE: CPT

## 2018-06-02 PROCEDURE — 81003 URINALYSIS AUTO W/O SCOPE: CPT | Performed by: INTERNAL MEDICINE

## 2018-06-02 PROCEDURE — 86200 CCP ANTIBODY: CPT

## 2018-06-02 PROCEDURE — 72050 X-RAY EXAM NECK SPINE 4/5VWS: CPT

## 2018-06-02 PROCEDURE — 86038 ANTINUCLEAR ANTIBODIES: CPT

## 2018-06-02 PROCEDURE — 87340 HEPATITIS B SURFACE AG IA: CPT

## 2018-06-02 PROCEDURE — 86160 COMPLEMENT ANTIGEN: CPT

## 2018-06-02 PROCEDURE — 83520 IMMUNOASSAY QUANT NOS NONAB: CPT

## 2018-06-02 PROCEDURE — 82550 ASSAY OF CK (CPK): CPT

## 2018-06-02 PROCEDURE — 76770 US EXAM ABDO BACK WALL COMP: CPT

## 2018-06-02 PROCEDURE — 72200 X-RAY EXAM SI JOINTS: CPT

## 2018-06-02 PROCEDURE — 85652 RBC SED RATE AUTOMATED: CPT

## 2018-06-02 PROCEDURE — 82306 VITAMIN D 25 HYDROXY: CPT

## 2018-06-02 PROCEDURE — 86256 FLUORESCENT ANTIBODY TITER: CPT

## 2018-06-02 PROCEDURE — 80053 COMPREHEN METABOLIC PANEL: CPT

## 2018-06-02 PROCEDURE — 80061 LIPID PANEL: CPT

## 2018-06-02 PROCEDURE — 73130 X-RAY EXAM OF HAND: CPT

## 2018-06-02 PROCEDURE — 86235 NUCLEAR ANTIGEN ANTIBODY: CPT

## 2018-06-02 PROCEDURE — 72110 X-RAY EXAM L-2 SPINE 4/>VWS: CPT

## 2018-06-02 PROCEDURE — 83036 HEMOGLOBIN GLYCOSYLATED A1C: CPT

## 2018-06-02 PROCEDURE — 84165 PROTEIN E-PHORESIS SERUM: CPT | Performed by: PATHOLOGY

## 2018-06-02 PROCEDURE — 81374 HLA I TYPING 1 ANTIGEN LR: CPT

## 2018-06-02 PROCEDURE — 84165 PROTEIN E-PHORESIS SERUM: CPT

## 2018-06-02 PROCEDURE — 36415 COLL VENOUS BLD VENIPUNCTURE: CPT

## 2018-06-04 LAB
CENTROMERE B AB SER-ACNC: <0.2 AI (ref 0–0.9)
DSDNA AB SER-ACNC: 1 IU/ML (ref 0–9)
ENA JO1 AB SER-ACNC: <0.2 AI (ref 0–0.9)
ENA RNP AB SER-ACNC: <0.2 AI (ref 0–0.9)
ENA SCL70 AB SER-ACNC: <0.2 AI (ref 0–0.9)
ENA SM AB SER-ACNC: <0.2 AI (ref 0–0.9)
ENA SS-A AB SER-ACNC: <0.2 AI (ref 0–0.9)
ENA SS-B AB SER-ACNC: <0.2 AI (ref 0–0.9)
MITOCHONDRIA M2 IGG SER-ACNC: 1 UNITS (ref 0–20)
RHEUMATOID FACT SER QL LA: NEGATIVE
RYE IGE QN: NEGATIVE
TTG IGA SER-ACNC: <2 U/ML (ref 0–3)
TTG IGG SER-ACNC: <2 U/ML (ref 0–5)

## 2018-06-05 LAB
CCP IGA+IGG SERPL IA-ACNC: 8 UNITS (ref 0–19)
MYELOPEROXIDASE AB SER IA-ACNC: <9 U/ML (ref 0–9)
PROTEINASE3 AB SER IA-ACNC: <3.5 U/ML (ref 0–3.5)

## 2018-06-06 LAB
ALBUMIN SERPL ELPH-MCNC: 4.68 G/DL (ref 3.5–5)
ALBUMIN SERPL ELPH-MCNC: 63.2 % (ref 52–65)
ALPHA1 GLOB SERPL ELPH-MCNC: 0.29 G/DL (ref 0.1–0.4)
ALPHA1 GLOB SERPL ELPH-MCNC: 3.9 % (ref 2.5–5)
ALPHA2 GLOB SERPL ELPH-MCNC: 0.69 G/DL (ref 0.4–1.2)
ALPHA2 GLOB SERPL ELPH-MCNC: 9.3 % (ref 7–13)
BETA GLOB ABNORMAL SERPL ELPH-MCNC: 0.41 G/DL (ref 0.4–0.8)
BETA1 GLOB SERPL ELPH-MCNC: 5.6 % (ref 5–13)
BETA2 GLOB SERPL ELPH-MCNC: 4.7 % (ref 2–8)
BETA2+GAMMA GLOB SERPL ELPH-MCNC: 0.35 G/DL (ref 0.2–0.5)
GAMMA GLOB ABNORMAL SERPL ELPH-MCNC: 0.98 G/DL (ref 0.5–1.6)
GAMMA GLOB SERPL ELPH-MCNC: 13.3 % (ref 12–22)
IGG/ALB SER: 1.72 {RATIO} (ref 1.1–1.8)
PROT PATTERN SERPL ELPH-IMP: NORMAL
PROT SERPL-MCNC: 7.4 G/DL (ref 6.4–8.2)

## 2018-06-08 LAB — CRYOGLOB SER QL 1D COLD INC: NORMAL

## 2018-06-11 LAB — HLA-B27 QL NAA+PROBE: NEGATIVE

## 2018-06-20 DIAGNOSIS — F32.A DEPRESSION, UNSPECIFIED DEPRESSION TYPE: ICD-10-CM

## 2018-06-20 RX ORDER — CITALOPRAM 40 MG/1
TABLET ORAL
Qty: 90 TABLET | Refills: 0 | Status: SHIPPED | OUTPATIENT
Start: 2018-06-20 | End: 2018-09-19 | Stop reason: SDUPTHER

## 2018-07-01 DIAGNOSIS — G43.809 OTHER MIGRAINE WITHOUT STATUS MIGRAINOSUS, NOT INTRACTABLE: ICD-10-CM

## 2018-07-01 RX ORDER — TOPIRAMATE 100 MG/1
TABLET, FILM COATED ORAL
Qty: 90 TABLET | Refills: 0 | Status: SHIPPED | OUTPATIENT
Start: 2018-07-01 | End: 2018-09-26 | Stop reason: SDUPTHER

## 2018-09-04 ENCOUNTER — TRANSCRIBE ORDERS (OUTPATIENT)
Dept: LAB | Facility: CLINIC | Age: 48
End: 2018-09-04

## 2018-09-04 ENCOUNTER — APPOINTMENT (OUTPATIENT)
Dept: LAB | Facility: CLINIC | Age: 48
End: 2018-09-04
Payer: COMMERCIAL

## 2018-09-04 DIAGNOSIS — Z78.0 MENOPAUSE: Primary | ICD-10-CM

## 2018-09-04 DIAGNOSIS — Z78.0 MENOPAUSE: ICD-10-CM

## 2018-09-04 LAB — FSH SERPL-ACNC: 8.9 MIU/ML

## 2018-09-04 PROCEDURE — 36415 COLL VENOUS BLD VENIPUNCTURE: CPT

## 2018-09-04 PROCEDURE — 83001 ASSAY OF GONADOTROPIN (FSH): CPT

## 2018-09-19 DIAGNOSIS — F32.A DEPRESSION, UNSPECIFIED DEPRESSION TYPE: ICD-10-CM

## 2018-09-19 RX ORDER — CITALOPRAM 40 MG/1
TABLET ORAL
Qty: 90 TABLET | Refills: 0 | Status: SHIPPED | OUTPATIENT
Start: 2018-09-19 | End: 2018-10-09 | Stop reason: SDUPTHER

## 2018-09-26 ENCOUNTER — OFFICE VISIT (OUTPATIENT)
Dept: INTERNAL MEDICINE CLINIC | Facility: CLINIC | Age: 48
End: 2018-09-26
Payer: COMMERCIAL

## 2018-09-26 VITALS
SYSTOLIC BLOOD PRESSURE: 124 MMHG | DIASTOLIC BLOOD PRESSURE: 86 MMHG | OXYGEN SATURATION: 98 % | WEIGHT: 164 LBS | RESPIRATION RATE: 16 BRPM | BODY MASS INDEX: 26.36 KG/M2 | HEART RATE: 80 BPM | HEIGHT: 66 IN

## 2018-09-26 DIAGNOSIS — E78.2 MIXED HYPERLIPIDEMIA: Primary | ICD-10-CM

## 2018-09-26 DIAGNOSIS — G43.809 OTHER MIGRAINE WITHOUT STATUS MIGRAINOSUS, NOT INTRACTABLE: ICD-10-CM

## 2018-09-26 DIAGNOSIS — E66.3 OVERWEIGHT (BMI 25.0-29.9): ICD-10-CM

## 2018-09-26 PROCEDURE — 99214 OFFICE O/P EST MOD 30 MIN: CPT | Performed by: NURSE PRACTITIONER

## 2018-09-26 RX ORDER — LUBIPROSTONE 24 UG/1
24 CAPSULE, GELATIN COATED ORAL
COMMUNITY
Start: 2018-08-30 | End: 2019-05-07 | Stop reason: SDUPTHER

## 2018-09-26 RX ORDER — ALPRAZOLAM 0.25 MG/1
0.25 TABLET ORAL 3 TIMES DAILY PRN
Qty: 30 TABLET | Refills: 0 | Status: CANCELLED | OUTPATIENT
Start: 2018-09-26

## 2018-09-26 RX ORDER — TOPIRAMATE 100 MG/1
100 TABLET, FILM COATED ORAL DAILY
Qty: 90 TABLET | Refills: 1 | Status: SHIPPED | OUTPATIENT
Start: 2018-09-26 | End: 2018-10-09 | Stop reason: SDUPTHER

## 2018-09-26 RX ORDER — VALACYCLOVIR HYDROCHLORIDE 500 MG/1
500 TABLET, FILM COATED ORAL DAILY
COMMUNITY
Start: 2018-09-06 | End: 2021-09-22 | Stop reason: SDUPTHER

## 2018-09-26 RX ORDER — DUPILUMAB 300 MG/2ML
INJECTION, SOLUTION SUBCUTANEOUS
COMMUNITY
Start: 2018-09-07 | End: 2022-04-19 | Stop reason: SDUPTHER

## 2018-09-26 NOTE — PROGRESS NOTES
Assessment/Plan:       Diagnoses and all orders for this visit:    Mixed hyperlipidemia    Other migraine without status migrainosus, not intractable  -     topiramate (TOPAMAX) 100 mg tablet; Take 1 tablet (100 mg total) by mouth daily    Overweight (BMI 25 0-29 9)  -     Ambulatory referral to Weight Management; Future    Other orders  -     valACYclovir (VALTREX) 500 mg tablet; Take 500 mg by mouth daily    -     AMITIZA 24 MCG capsule; Take 24 mcg by mouth daily with breakfast    -     DUPIXENT 300 MG/2ML SOSY;   -     Cancel: ALPRAZolam (XANAX) 0 25 mg tablet; Take 1 tablet (0 25 mg total) by mouth 3 (three) times a day as needed for anxiety        dw patient she should see a weight loss specialist and try their program first before going on a medication    Subjective:      Patient ID: Bell Wagner is a 49 y o  female  Pt is here for weightloss  Gained weight over the past few years    Used to be on weight watchers  Not being very strict with her diet  She is exercising    She wants to try qsymia        The following portions of the patient's history were reviewed and updated as appropriate: allergies, current medications, past family history, past medical history, past social history, past surgical history and problem list     Review of Systems   Constitutional: Negative  HENT: Negative  Eyes: Negative  Respiratory: Negative  Cardiovascular: Negative  Gastrointestinal: Negative  Musculoskeletal: Negative  Neurological: Negative  Family History   Problem Relation Age of Onset    Hypertension Mother     Colon cancer Father      Past Medical History:   Diagnosis Date    Asthma     Last assessed 9/25/2014    Eczema      Social History     Social History    Marital status: /Civil Union     Spouse name: N/A    Number of children: N/A    Years of education: N/A     Occupational History    Not on file       Social History Main Topics    Smoking status: Former Smoker    Smokeless tobacco: Not on file    Alcohol use Yes      Comment: Social    Drug use: Unknown    Sexual activity: Not on file     Other Topics Concern    Not on file     Social History Narrative    No narrative on file       Current Outpatient Prescriptions:     ALPRAZolam (XANAX) 0 25 mg tablet, Take 1 tablet by mouth 3 (three) times a day as needed, Disp: , Rfl:     AMITIZA 24 MCG capsule, Take 24 mcg by mouth daily with breakfast  , Disp: , Rfl:     Cholecalciferol (VITAMIN D3) 2000 units capsule, Take 2 capsules by mouth daily  , Disp: , Rfl:     citalopram (CeleXA) 40 mg tablet, TAKE ONE TABLET BY MOUTH DAILY, Disp: 90 tablet, Rfl: 0    DUPIXENT 300 MG/2ML SOSY, , Disp: , Rfl:     fluticasone (FLONASE) 50 mcg/act nasal spray, 2 sprays into each nostril daily, Disp: , Rfl:     topiramate (TOPAMAX) 100 mg tablet, Take 1 tablet (100 mg total) by mouth daily, Disp: 90 tablet, Rfl: 1    valACYclovir (VALTREX) 500 mg tablet, Take 500 mg by mouth daily  , Disp: , Rfl:   Allergies   Allergen Reactions    Other          Objective:      /86   Pulse 80   Resp 16   Ht 5' 6" (1 676 m)   Wt 74 4 kg (164 lb)   SpO2 98%   BMI 26 47 kg/m²          Physical Exam   Constitutional: She is oriented to person, place, and time  She appears well-developed and well-nourished  HENT:   Head: Normocephalic and atraumatic  Eyes: Pupils are equal, round, and reactive to light  Conjunctivae are normal    Neck: Normal range of motion  Neck supple  Cardiovascular: Normal rate and regular rhythm  Pulmonary/Chest: Effort normal and breath sounds normal    Abdominal: Soft  Bowel sounds are normal    Musculoskeletal: Normal range of motion  Neurological: She is alert and oriented to person, place, and time  Skin: Skin is warm and dry

## 2018-10-01 DIAGNOSIS — Z12.31 ENCOUNTER FOR SCREENING MAMMOGRAM FOR BREAST CANCER: Primary | ICD-10-CM

## 2018-10-09 ENCOUNTER — OFFICE VISIT (OUTPATIENT)
Dept: INTERNAL MEDICINE CLINIC | Facility: CLINIC | Age: 48
End: 2018-10-09
Payer: COMMERCIAL

## 2018-10-09 VITALS
HEART RATE: 75 BPM | SYSTOLIC BLOOD PRESSURE: 132 MMHG | OXYGEN SATURATION: 100 % | DIASTOLIC BLOOD PRESSURE: 84 MMHG | RESPIRATION RATE: 16 BRPM | WEIGHT: 167.8 LBS | HEIGHT: 66 IN | BODY MASS INDEX: 26.97 KG/M2

## 2018-10-09 DIAGNOSIS — R42 DIZZINESS: ICD-10-CM

## 2018-10-09 DIAGNOSIS — F41.9 ANXIETY: Primary | ICD-10-CM

## 2018-10-09 DIAGNOSIS — G43.809 OTHER MIGRAINE WITHOUT STATUS MIGRAINOSUS, NOT INTRACTABLE: ICD-10-CM

## 2018-10-09 DIAGNOSIS — R29.2 HYPERREFLEXIA: ICD-10-CM

## 2018-10-09 DIAGNOSIS — F32.A DEPRESSION, UNSPECIFIED DEPRESSION TYPE: ICD-10-CM

## 2018-10-09 PROBLEM — G43.909 MIGRAINE: Status: ACTIVE | Noted: 2018-10-09

## 2018-10-09 PROCEDURE — 99214 OFFICE O/P EST MOD 30 MIN: CPT | Performed by: INTERNAL MEDICINE

## 2018-10-09 RX ORDER — ALPRAZOLAM 0.25 MG/1
0.25 TABLET ORAL 3 TIMES DAILY PRN
Qty: 30 TABLET | Refills: 0 | Status: SHIPPED | OUTPATIENT
Start: 2018-10-09 | End: 2022-05-27 | Stop reason: SDUPTHER

## 2018-10-09 RX ORDER — CITALOPRAM 40 MG/1
40 TABLET ORAL DAILY
Qty: 90 TABLET | Refills: 0 | Status: SHIPPED | OUTPATIENT
Start: 2018-10-09 | End: 2018-12-27 | Stop reason: SDUPTHER

## 2018-10-09 RX ORDER — ALPRAZOLAM 0.25 MG/1
0.25 TABLET ORAL 3 TIMES DAILY PRN
Qty: 30 TABLET | Refills: 1 | Status: CANCELLED | OUTPATIENT
Start: 2018-10-09

## 2018-10-09 RX ORDER — TOPIRAMATE 100 MG/1
100 TABLET, FILM COATED ORAL DAILY
Qty: 90 TABLET | Refills: 0 | Status: SHIPPED | OUTPATIENT
Start: 2018-10-09 | End: 2019-06-12 | Stop reason: SDUPTHER

## 2018-10-09 NOTE — PROGRESS NOTES
Assessment/Plan:           Problem List Items Addressed This Visit        Cardiovascular and Mediastinum    Migraine    Relevant Medications    citalopram (CeleXA) 40 mg tablet    topiramate (TOPAMAX) 100 mg tablet       Other    Anxiety - Primary     Patient does appear to be anxious the no suicidal ideation we did request the pharmacist to give the previous many fracture for both the Celexa and also Topamax of her symptoms not improve we may need to change to a different SSRI  The patient did request refill of Xanax , we did check the PDMP         Relevant Medications    ALPRAZolam (XANAX) 0 25 mg tablet    Dizziness    Relevant Orders    Cortisol Level, AM Specimen    Luteinizing hormone    TSH, 3rd generation    Vitamin B12    Copper Level    Vitamin D 25 hydroxy    Heavy metals screen    MRI brain w wo contrast    Basic metabolic panel    Hyperreflexia     On examination the patient is hyper reflex, brain zaps, dizziness; I will check laboratories and MRI of the brain check vitamin B12 level, copper level, vitamin-D level         Relevant Orders    Vitamin B12    Copper Level    Vitamin D 25 hydroxy    Heavy metals screen    MRI brain w wo contrast    Basic metabolic panel    Depression    Relevant Medications    ALPRAZolam (XANAX) 0 25 mg tablet    citalopram (CeleXA) 40 mg tablet    Other Relevant Orders    Heavy metals screen          Return to office  1 month  call if any problems  Subjective:      Patient ID: Chucky Gross is a 49 y o  female      HPI 49year old female coming in with a chief complaint of dizziness, chills, trying, depression, brain status, change of medication; the pt reports brain zaps , seratonin discontinuation , the manufacture changed , light headed, dizzy , chills , sweating , crying not sad  Cried all weekend  No si no problems ; weight gain; gaining weight no fever, blurry , no thirst , no poly uria, no ha  No cofusion ,  No gi symptoms, no nausea, no vomiting the patient does report me she has had a total abdominal hysterectomy she is uncertain if she has undergone menopause  She reports me she has noticed the symptoms after a manufacture change her medications at 1st she thought it was  Celexa that was changed but after further conversation with the pharmacist it was her Topamax that changed manufacture  She reports me she did not change her diet and she did not change her exercise level  The following portions of the patient's history were reviewed and updated as appropriate: allergies, current medications, past family history, past medical history, past social history, past surgical history and problem list     Review of Systems   Constitutional: Positive for chills and unexpected weight change  Negative for activity change and appetite change  HENT: Negative for congestion and postnasal drip  Eyes: Negative for visual disturbance  Respiratory: Negative for cough and shortness of breath  Cardiovascular: Negative for chest pain, palpitations and leg swelling  Gastrointestinal: Negative for abdominal pain, diarrhea, nausea and vomiting  Neurological: Positive for dizziness  Negative for headaches  Hematological: Negative for adenopathy  Psychiatric/Behavioral: Negative for suicidal ideas  The patient is nervous/anxious  Objective:                    No Follow-up on file        Allergies   Allergen Reactions    Other        Past Medical History:   Diagnosis Date    Asthma     Last assessed 9/25/2014    Eczema      Past Surgical History:   Procedure Laterality Date    TONSILLECTOMY AND ADENOIDECTOMY       Current Outpatient Prescriptions on File Prior to Visit   Medication Sig Dispense Refill    AMITIZA 24 MCG capsule Take 24 mcg by mouth daily with breakfast        Cholecalciferol (VITAMIN D3) 2000 units capsule Take 2 capsules by mouth daily        DUPIXENT 300 MG/2ML SOSY       fluticasone (FLONASE) 50 mcg/act nasal spray 2 sprays into each nostril daily      valACYclovir (VALTREX) 500 mg tablet Take 500 mg by mouth daily        [DISCONTINUED] ALPRAZolam (XANAX) 0 25 mg tablet Take 1 tablet by mouth 3 (three) times a day as needed      [DISCONTINUED] citalopram (CeleXA) 40 mg tablet TAKE ONE TABLET BY MOUTH DAILY 90 tablet 0    [DISCONTINUED] topiramate (TOPAMAX) 100 mg tablet Take 1 tablet (100 mg total) by mouth daily 90 tablet 1     No current facility-administered medications on file prior to visit  Family History   Problem Relation Age of Onset    Hypertension Mother     Colon cancer Father      Social History     Social History    Marital status: /Civil Union     Spouse name: N/A    Number of children: N/A    Years of education: N/A     Occupational History    Not on file  Social History Main Topics    Smoking status: Former Smoker    Smokeless tobacco: Never Used    Alcohol use Yes      Comment: Social    Drug use: Unknown    Sexual activity: Not on file     Other Topics Concern    Not on file     Social History Narrative    No narrative on file     Vitals:    10/09/18 1133   BP: 132/84   BP Location: Right arm   Patient Position: Sitting   Cuff Size: Standard   Pulse: 75   Resp: 16   SpO2: 100%   Weight: 76 1 kg (167 lb 12 8 oz)   Height: 5' 6" (1 676 m)     Results for orders placed or performed in visit on 19/81/35   Follicle stimulating hormone   Result Value Ref Range    FSH 8 9 mIU/mL     Weight (last 2 days)     Date/Time   Weight    10/09/18 1133  76 1 (167 8)            Body mass index is 27 08 kg/m²    BP      Temp      Pulse     Resp      SpO2        Vitals:    10/09/18 1133   Weight: 76 1 kg (167 lb 12 8 oz)     Vitals:    10/09/18 1133   Weight: 76 1 kg (167 lb 12 8 oz)       /84 (BP Location: Right arm, Patient Position: Sitting, Cuff Size: Standard)   Pulse 75   Resp 16   Ht 5' 6" (1 676 m)   Wt 76 1 kg (167 lb 12 8 oz)   SpO2 100%   BMI 27 08 kg/m²       Cranial nerves 2 through 12 grossly intact, motor strength 5/5, DTRs 4 over 4 bilateral and symmetrical, eomi   Physical Exam   Constitutional: She appears well-developed and well-nourished  HENT:   Head: Normocephalic  Mouth/Throat: Oropharynx is clear and moist    Eyes: Pupils are equal, round, and reactive to light  Conjunctivae are normal  Right eye exhibits no discharge  Left eye exhibits no discharge  No scleral icterus  Neck: Neck supple  Cardiovascular: Normal rate, regular rhythm, normal heart sounds and intact distal pulses  Exam reveals no gallop and no friction rub  No murmur heard  Pulmonary/Chest: Breath sounds normal  No respiratory distress  She has no wheezes  She has no rales  Abdominal: Soft  Bowel sounds are normal  She exhibits no distension and no mass  There is no tenderness  There is no rebound and no guarding  Musculoskeletal: She exhibits no edema or deformity  Lymphadenopathy:     She has no cervical adenopathy  Neurological: She is alert   Coordination normal

## 2018-10-09 NOTE — ASSESSMENT & PLAN NOTE
On examination the patient is hyper reflex, brain zaps, dizziness; I will check laboratories and MRI of the brain check vitamin B12 level, copper level, vitamin-D level

## 2018-10-09 NOTE — TELEPHONE ENCOUNTER
PDMP completed [facility] 9/26/18, no other providers          09/24/2017  1  09/01/2017  ALPRAZOLAM 0 25 MG TABLET  10 0  3  [id number]  0

## 2018-10-09 NOTE — ASSESSMENT & PLAN NOTE
Patient does appear to be anxious the no suicidal ideation we did request the pharmacist to give the previous many fracture for both the Celexa and also Topamax of her symptoms not improve we may need to change to a different SSRI    The patient did request refill of Xanax , we did check the PDMP

## 2018-10-15 ENCOUNTER — APPOINTMENT (OUTPATIENT)
Dept: LAB | Facility: CLINIC | Age: 48
End: 2018-10-15
Payer: COMMERCIAL

## 2018-10-15 DIAGNOSIS — F32.A DEPRESSION, UNSPECIFIED DEPRESSION TYPE: ICD-10-CM

## 2018-10-15 DIAGNOSIS — R29.2 HYPERREFLEXIA: ICD-10-CM

## 2018-10-15 DIAGNOSIS — E53.8 LOW VITAMIN B12 LEVEL: Primary | ICD-10-CM

## 2018-10-15 DIAGNOSIS — R42 DIZZINESS: ICD-10-CM

## 2018-10-15 LAB
25(OH)D3 SERPL-MCNC: 64.6 NG/ML (ref 30–100)
ANION GAP SERPL CALCULATED.3IONS-SCNC: 5 MMOL/L (ref 4–13)
BUN SERPL-MCNC: 14 MG/DL (ref 5–25)
CALCIUM SERPL-MCNC: 8.6 MG/DL (ref 8.3–10.1)
CHLORIDE SERPL-SCNC: 107 MMOL/L (ref 100–108)
CO2 SERPL-SCNC: 24 MMOL/L (ref 21–32)
CORTIS AM PEAK SERPL-MCNC: 18.9 UG/DL (ref 4.2–22.4)
CREAT SERPL-MCNC: 0.91 MG/DL (ref 0.6–1.3)
GFR SERPL CREATININE-BSD FRML MDRD: 75 ML/MIN/1.73SQ M
GLUCOSE P FAST SERPL-MCNC: 99 MG/DL (ref 65–99)
HCYS SERPL-SCNC: 9.4 UMOL/L (ref 3.7–11.2)
LH SERPL-ACNC: 8.7 MIU/ML
POTASSIUM SERPL-SCNC: 4.1 MMOL/L (ref 3.5–5.3)
SODIUM SERPL-SCNC: 136 MMOL/L (ref 136–145)
TSH SERPL DL<=0.05 MIU/L-ACNC: 1.75 UIU/ML (ref 0.36–3.74)
VIT B12 SERPL-MCNC: 304 PG/ML (ref 100–900)

## 2018-10-15 PROCEDURE — 83090 ASSAY OF HOMOCYSTEINE: CPT

## 2018-10-15 PROCEDURE — 83655 ASSAY OF LEAD: CPT

## 2018-10-15 PROCEDURE — 83002 ASSAY OF GONADOTROPIN (LH): CPT

## 2018-10-15 PROCEDURE — 80048 BASIC METABOLIC PNL TOTAL CA: CPT

## 2018-10-15 PROCEDURE — 82306 VITAMIN D 25 HYDROXY: CPT

## 2018-10-15 PROCEDURE — 82525 ASSAY OF COPPER: CPT

## 2018-10-15 PROCEDURE — 82300 ASSAY OF CADMIUM: CPT

## 2018-10-15 PROCEDURE — 82533 TOTAL CORTISOL: CPT

## 2018-10-15 PROCEDURE — 82175 ASSAY OF ARSENIC: CPT

## 2018-10-15 PROCEDURE — 36415 COLL VENOUS BLD VENIPUNCTURE: CPT

## 2018-10-15 PROCEDURE — 83825 ASSAY OF MERCURY: CPT

## 2018-10-15 PROCEDURE — 84443 ASSAY THYROID STIM HORMONE: CPT

## 2018-10-15 PROCEDURE — 83918 ORGANIC ACIDS TOTAL QUANT: CPT

## 2018-10-15 PROCEDURE — 82607 VITAMIN B-12: CPT

## 2018-10-17 LAB — COPPER SERPL-MCNC: 110 UG/DL (ref 72–166)

## 2018-10-18 LAB
ARSENIC BLD-MCNC: 7 UG/L (ref 2–23)
CADMIUM BLD-MCNC: NORMAL UG/L (ref 0–1.2)
LEAD BLD-MCNC: NORMAL UG/DL (ref 0–4)
MERCURY BLD-MCNC: NORMAL UG/L (ref 0–14.9)
METHYLMALONATE SERPL-SCNC: 226 NMOL/L (ref 0–378)
SL AMB DISCLAIMER: NORMAL

## 2018-10-24 ENCOUNTER — HOSPITAL ENCOUNTER (OUTPATIENT)
Dept: RADIOLOGY | Facility: HOSPITAL | Age: 48
Discharge: HOME | End: 2018-10-24
Payer: COMMERCIAL

## 2018-10-24 DIAGNOSIS — R29.2 HYPERREFLEXIA: ICD-10-CM

## 2018-10-24 DIAGNOSIS — R42 DIZZINESS: ICD-10-CM

## 2018-10-24 PROCEDURE — 70553 MRI BRAIN STEM W/O & W/DYE: CPT

## 2018-10-24 PROCEDURE — A9585 GADOBUTROL INJECTION: HCPCS | Performed by: RADIOLOGY

## 2018-10-24 RX ADMIN — GADOBUTROL 7 ML: 604.72 INJECTION INTRAVENOUS at 21:00

## 2018-10-26 ENCOUNTER — OFFICE VISIT (OUTPATIENT)
Dept: INTERNAL MEDICINE CLINIC | Facility: CLINIC | Age: 48
End: 2018-10-26
Payer: COMMERCIAL

## 2018-10-26 VITALS
TEMPERATURE: 97.8 F | BODY MASS INDEX: 26.97 KG/M2 | HEART RATE: 78 BPM | HEIGHT: 66 IN | OXYGEN SATURATION: 99 % | SYSTOLIC BLOOD PRESSURE: 120 MMHG | WEIGHT: 167.8 LBS | DIASTOLIC BLOOD PRESSURE: 89 MMHG

## 2018-10-26 DIAGNOSIS — D32.9 MENINGIOMA (HCC): Primary | ICD-10-CM

## 2018-10-26 PROCEDURE — 99213 OFFICE O/P EST LOW 20 MIN: CPT | Performed by: INTERNAL MEDICINE

## 2018-10-26 NOTE — LETTER
To whom it may concern:    Abdi Elmore was seen in our office today  The patient may return to work on Monday October 29, 2018        Sincerely,           Lamont Peters, [address]

## 2018-10-28 PROBLEM — D32.9 MENINGIOMA (HCC): Status: ACTIVE | Noted: 2018-10-28

## 2018-10-28 NOTE — PROGRESS NOTES
Assessment/Plan:    [facility]  I did review the MRI in detail I have explained to the patient is a small meningioma likely asymptomatic although patient does have mild hyper reflexia I would like to get the opinion of neurosurgery          Problem List Items Addressed This Visit        Nervous and Auditory    Meningioma ([facility] ) - Primary     I did review the MRI in detail I have explained to the patient is a small meningioma likely asymptomatic although patient does have mild hyper reflexia I would like to get the opinion of neurosurgery          Relevant Orders    Ambulatory referral to Neurosurgery          Return to office  as scheduled  call if any problems  The patient does have a low normal vitamin B12 level but not true B12 deficiency I did review her laboratories with her she has started over-the-counter vitamin B12 1000 mcg once a day which I think is reasonable  Subjective:      Patient ID: April Kearns is a 49 y o  female  HPI  67-year-old female who is coming in at our request to review the MRI that does show a meningioma today we did review the MRI in detail she reports me that her mood has improved since going back to trade name Topamax  Overall she is feeling well no headaches, no weakness, no numbness or tingling no difficulty with memory overall she is feeling well  The following portions of the patient's history were reviewed and updated as appropriate: allergies, current medications, past family history, past medical history, past social history, past surgical history and problem list     Review of Systems   Constitutional: Negative for unexpected weight change  Neurological: Negative for dizziness, facial asymmetry, speech difficulty, light-headedness, numbness and headaches  Psychiatric/Behavioral: The patient is not nervous/anxious            Objective:      /89   Pulse 78   Temp 97 8 °F (36 6 °C)   Ht 5' 6" (1 676 m)   Wt 76 1 kg (167 lb 12 8 oz)   SpO2 99%   BMI 27 08 kg/m²                     No Follow-up on file  Allergies   Allergen Reactions    Other        Past Medical History:   Diagnosis Date    Asthma     Last assessed 9/25/2014    Eczema      Past Surgical History:   Procedure Laterality Date    TONSILLECTOMY AND ADENOIDECTOMY       Current Outpatient Prescriptions on File Prior to Visit   Medication Sig Dispense Refill    ALPRAZolam (XANAX) 0 25 mg tablet Take 1 tablet (0 25 mg total) by mouth 3 (three) times a day as needed for anxiety 30 tablet 0    AMITIZA 24 MCG capsule Take 24 mcg by mouth daily with breakfast        Cholecalciferol (VITAMIN D3) 2000 units capsule Take 2 capsules by mouth daily        citalopram (CeleXA) 40 mg tablet Take 1 tablet (40 mg total) by mouth daily 90 tablet 0    DUPIXENT 300 MG/2ML SOSY       fluticasone (FLONASE) 50 mcg/act nasal spray 2 sprays into each nostril daily      topiramate (TOPAMAX) 100 mg tablet Take 1 tablet (100 mg total) by mouth daily 90 tablet 0    valACYclovir (VALTREX) 500 mg tablet Take 500 mg by mouth daily         No current facility-administered medications on file prior to visit  Family History   Problem Relation Age of Onset    Hypertension Mother     Colon cancer Father      Social History     Social History    Marital status: /Civil Union     Spouse name: N/A    Number of children: N/A    Years of education: N/A     Occupational History    Not on file       Social History Main Topics    Smoking status: Former Smoker    Smokeless tobacco: Never Used    Alcohol use Yes      Comment: Social    Drug use: Unknown    Sexual activity: Not on file     Other Topics Concern    Not on file     Social History Narrative    No narrative on file     Vitals:    10/26/18 1513   BP: 120/89   Pulse: 78   Temp: 97 8 °F (36 6 °C)   SpO2: 99%   Weight: 76 1 kg (167 lb 12 8 oz)   Height: 5' 6" (1 676 m)     Results for orders placed or performed in visit on 10/15/18   Homocysteine, serum   Result Value Ref Range    Homocyst(e)ine, P/S 9 4 3 7 - 11 2 umol/L   Methylmalonic acid, serum   Result Value Ref Range    Methylmalonic Acid, S 226 0 - 378 nmol/L    Disclaimer: Comment      Weight (last 2 days)     Date/Time   Weight    10/26/18 1513  76 1 (167 8)            Body mass index is 27 08 kg/m²  BP      Temp      Pulse     Resp      SpO2        Vitals:    10/26/18 1513   Weight: 76 1 kg (167 lb 12 8 oz)     Vitals:    10/26/18 1513   Weight: 76 1 kg (167 lb 12 8 oz)        Physical Exam   Constitutional: She is oriented to person, place, and time  She appears well-developed and well-nourished  No distress  HENT:   Head: Normocephalic and atraumatic  Right Ear: External ear normal    Left Ear: External ear normal    Nose: Nose normal    Mouth/Throat: Oropharynx is clear and moist  No oropharyngeal exudate  Eyes: Pupils are equal, round, and reactive to light  Conjunctivae and EOM are normal  Right eye exhibits no discharge  Left eye exhibits no discharge  No scleral icterus  Cardiovascular: Normal heart sounds  No murmur heard  Pulmonary/Chest: No respiratory distress  She has no wheezes  She has no rales  Lymphadenopathy:     She has no cervical adenopathy  Neurological: She is alert and oriented to person, place, and time  No cranial nerve deficit or sensory deficit   Coordination and gait normal    Skin: She is not diaphoretic      motor strength 5/5, able to walk on a straight line heel to toe without any difficulty, finger to nose testing intact EOMI, hyperreflexia bilateral patellar reflex

## 2018-10-29 ENCOUNTER — TELEPHONE (OUTPATIENT)
Dept: NEUROSURGERY | Facility: CLINIC | Age: 48
End: 2018-10-29

## 2018-10-29 NOTE — TELEPHONE ENCOUNTER
Pt reports she spoke with someone earlier and was expecting a call re [facility]ANY[facility] appt  Explained the information she provided was sent to TANYA for review and once completed she would be contacted re appt  She stated an understanding

## 2018-11-06 ENCOUNTER — OFFICE VISIT (OUTPATIENT)
Dept: NEUROSURGERY | Facility: CLINIC | Age: 48
End: 2018-11-06
Payer: COMMERCIAL

## 2018-11-06 VITALS
TEMPERATURE: 97.8 F | DIASTOLIC BLOOD PRESSURE: 96 MMHG | HEART RATE: 76 BPM | WEIGHT: 169 LBS | BODY MASS INDEX: 27.16 KG/M2 | SYSTOLIC BLOOD PRESSURE: 138 MMHG | HEIGHT: 66 IN | RESPIRATION RATE: 16 BRPM

## 2018-11-06 DIAGNOSIS — M89.8X8 MASS OF SKULL: Primary | ICD-10-CM

## 2018-11-06 DIAGNOSIS — D32.9 MENINGIOMA (HCC): ICD-10-CM

## 2018-11-06 PROCEDURE — 99203 OFFICE O/P NEW LOW 30 MIN: CPT | Performed by: NEUROLOGICAL SURGERY

## 2018-11-06 NOTE — PROGRESS NOTES
Neurosurgery Office Note  Karen Grant is a 51 y o  female    Type of Visit: Consult        Diagnoses and all orders for this visit:    Mass of skull  -     MRI brain with and without contrast; Future    Meningioma ([facility] [address] )  -     Ambulatory referral to Neurosurgery  -     MRI brain with and without contrast; Future          DISCUSSION SUMMARY    This is a 48-year-old female with incidentally noted calcified mass in the vertex which is roughly unchanged  I believe this to be asymptomatic  There is no change in the parenchyma of the surrounding brain  Although this could be easily removed, I do not believe it is symptomatic and therefore there would be no change in the patient's overall quality of life by undergoing thid surgical procedure  CHIEF COMPLAINT  Patient presents for 13MM Meningioma, patient complains of "Brain Zaps", Dizzy Spells, Blurred vision  Diagnoses:       SX INFO      HISTORY OF PRESENT ILLNESS   patient  Reports some visual disturbance associated with blurred vision in all fields  She denies focal headaches   she has had no seizures   she has some diffuse neck pain   the symptoms do not interrupt her ability to sleep or concentrate      REVIEW OF SYSTEMS  Review of Systems   Constitutional: Negative  HENT: Negative  Eyes: Positive for visual disturbance  Blurred vision    Respiratory: Positive for wheezing (uses inhaler )  Cardiovascular: Negative  Gastrointestinal: Positive for constipation (uses amitiza )  Endocrine: Negative  Genitourinary: Negative  Musculoskeletal: Positive for neck pain (Back of neck )  Skin: Negative  Allergic/Immunologic: Positive for environmental allergies  Neurological: Positive for dizziness and headaches  Hematological: Negative  Psychiatric/Behavioral: The patient is nervous/anxious (anxiety since diagnosis )  All other systems reviewed and are negative        The patient's ROS was reviewed by MD JONES reviewed the ROS    Active Ambulatory Problems     Diagnosis Date Noted    Anxiety 10/09/2018    Dizziness 10/09/2018    Hyperreflexia 10/09/2018    Migraine 10/09/2018    Depression 10/09/2018    Meningioma ([address] ) 10/28/2018    Mass of skull 11/06/2018     Resolved Ambulatory Problems     Diagnosis Date Noted    No Resolved Ambulatory Problems     Past Medical History:   Diagnosis Date    Asthma     Eczema        Past Surgical History:   Procedure Laterality Date    TONSILLECTOMY AND ADENOIDECTOMY         History   Smoking Status    Former Smoker   Smokeless Tobacco    Never Used       History   Alcohol Use    Yes     Comment: Social       History   Drug use: Unknown       Vitals:    11/06/18 1540   BP: 138/96   BP Location: Left arm   Patient Position: Sitting   Cuff Size: Standard   Pulse: 76   Resp: 16   Temp: 97 8 °F (36 6 °C)   TempSrc: Tympanic   Weight: 76 7 kg (169 lb)   Height: 5' 6" (1 676 m)         Current Outpatient Prescriptions:     ALPRAZolam (XANAX) 0 25 mg tablet, Take 1 tablet (0 25 mg total) by mouth 3 (three) times a day as needed for anxiety (Patient taking differently: Take 0 25 mg by mouth as needed for anxiety  ), Disp: 30 tablet, Rfl: 0    AMITIZA 24 MCG capsule, Take 24 mcg by mouth daily with breakfast  , Disp: , Rfl:     Cholecalciferol (VITAMIN D3) 2000 units capsule, Take 2 capsules by mouth daily  , Disp: , Rfl:     citalopram (CeleXA) 40 mg tablet, Take 1 tablet (40 mg total) by mouth daily, Disp: 90 tablet, Rfl: 0    DUPIXENT 300 MG/2ML SOSY, , Disp: , Rfl:     fluticasone (FLONASE) 50 mcg/act nasal spray, 2 sprays into each nostril daily, Disp: , Rfl:     topiramate (TOPAMAX) 100 mg tablet, Take 1 tablet (100 mg total) by mouth daily, Disp: 90 tablet, Rfl: 0    valACYclovir (VALTREX) 500 mg tablet, Take 500 mg by mouth daily  , Disp: , Rfl:     The following portions of the patient's history were reviewed by MD and updated by MA as appropriate: allergies, current medications, past family history, past medical history, past social history, past surgical history and problem list       Physical Exam   Constitutional: She is oriented to person, place, and time  She appears well-developed  HENT:   Head: Normocephalic and atraumatic  Eyes: Pupils are equal, round, and reactive to light  EOM are normal    Neck: Normal range of motion  Neurological: She is alert and oriented to person, place, and time  She has normal strength  A cranial nerve deficit is present  She displays a negative Romberg sign  Coordination and gait abnormal  GCS eye subscore is 4  GCS verbal subscore is 5  GCS motor subscore is 6  Reflex Scores:       Tricep reflexes are 2+ on the right side and 2+ on the left side  Bicep reflexes are 2+ on the right side and 2+ on the left side  Brachioradialis reflexes are 2+ on the right side and 2+ on the left side  Patellar reflexes are 2+ on the right side and 2+ on the left side  Achilles reflexes are 2+ on the right side and 2+ on the left side          RESULTS/DATA  MRI of the brain demonstrates a high left convexity non enhancing calcified mass wich is very low signal on MRI long sequences consistent with a calcified mass or exostosis

## 2018-11-06 NOTE — LETTER
November 12, 2018     Caleb Oscar  [address] [facility] [address]    Patient: Ever Morales   YOB: 1970   Date of Visit: 11/6/2018       Dear Dr Hawkins First: Thank you for referring Ever Morales to me for evaluation  Below are my notes for this consultation  If you have questions, please do not hesitate to call me  I look forward to following your patient along with you  Sincerely,        Janeth Yates MD        CC: No Recipients  Janeth Yates MD  11/12/2018  6:01 AM  Sign at close encounter  Neurosurgery Office Note  Ever Morales is a 51 y o  female    Type of Visit: Consult        Diagnoses and all orders for this visit:    Mass of skull  -     MRI brain with and without contrast; Future    Meningioma [facility])  -     Ambulatory referral to Neurosurgery  -     MRI brain with and without contrast; Future          DISCUSSION SUMMARY    This is a 59-year-old female with incidentally noted calcified mass in the vertex which is roughly unchanged  I believe this to be asymptomatic  There is no change in the parenchyma of the surrounding brain  Although this could be easily removed, I do not believe it is symptomatic and therefore there would be no change in the patient's overall quality of life by undergoing thid surgical procedure  CHIEF COMPLAINT  Patient presents for 13MM Meningioma, patient complains of "Brain Zaps", Dizzy Spells, Blurred vision  Diagnoses:       SX INFO      HISTORY OF PRESENT ILLNESS   patient  Reports some visual disturbance associated with blurred vision in all fields  She denies focal headaches   she has had no seizures   she has some diffuse neck pain   the symptoms do not interrupt her ability to sleep or concentrate      REVIEW OF SYSTEMS  Review of Systems   Constitutional: Negative  HENT: Negative  Eyes: Positive for visual disturbance  Blurred vision    Respiratory: Positive for wheezing (uses inhaler )  Cardiovascular: Negative  Gastrointestinal: Positive for constipation (uses amitiza )  Endocrine: Negative  Genitourinary: Negative  Musculoskeletal: Positive for neck pain (Back of neck )  Skin: Negative  Allergic/Immunologic: Positive for environmental allergies  Neurological: Positive for dizziness and headaches  Hematological: Negative  Psychiatric/Behavioral: The patient is nervous/anxious (anxiety since diagnosis )  All other systems reviewed and are negative        The patient's ROS was reviewed by MD JONES reviewed the ROS    Active Ambulatory Problems     Diagnosis Date Noted    Anxiety 10/09/2018    Dizziness 10/09/2018    Hyperreflexia 10/09/2018    Migraine 10/09/2018    Depression 10/09/2018    Meningioma ([address] ) 10/28/2018    Mass of skull 11/06/2018     Resolved Ambulatory Problems     Diagnosis Date Noted    No Resolved Ambulatory Problems     Past Medical History:   Diagnosis Date    Asthma     Eczema        Past Surgical History:   Procedure Laterality Date    TONSILLECTOMY AND ADENOIDECTOMY         History   Smoking Status    Former Smoker   Smokeless Tobacco    Never Used       History   Alcohol Use    Yes     Comment: Social       History   Drug use: Unknown       Vitals:    11/06/18 1540   BP: 138/96   BP Location: Left arm   Patient Position: Sitting   Cuff Size: Standard   Pulse: 76   Resp: 16   Temp: 97 8 °F (36 6 °C)   TempSrc: Tympanic   Weight: 76 7 kg (169 lb)   Height: 5' 6" (1 676 m)         Current Outpatient Prescriptions:     ALPRAZolam (XANAX) 0 25 mg tablet, Take 1 tablet (0 25 mg total) by mouth 3 (three) times a day as needed for anxiety (Patient taking differently: Take 0 25 mg by mouth as needed for anxiety  ), Disp: 30 tablet, Rfl: 0    AMITIZA 24 MCG capsule, Take 24 mcg by mouth daily with breakfast  , Disp: , Rfl:     Cholecalciferol (VITAMIN D3) 2000 units capsule, Take 2 capsules by mouth daily  , Disp: , Rfl:     citalopram (CeleXA) 40 mg tablet, Take 1 tablet (40 mg total) by mouth daily, Disp: 90 tablet, Rfl: 0    DUPIXENT 300 MG/2ML SOSY, , Disp: , Rfl:     fluticasone (FLONASE) 50 mcg/act nasal spray, 2 sprays into each nostril daily, Disp: , Rfl:     topiramate (TOPAMAX) 100 mg tablet, Take 1 tablet (100 mg total) by mouth daily, Disp: 90 tablet, Rfl: 0    valACYclovir (VALTREX) 500 mg tablet, Take 500 mg by mouth daily  , Disp: , Rfl:     The following portions of the patient's history were reviewed by MD and updated by MA as appropriate: allergies, current medications, past family history, past medical history, past social history, past surgical history and problem list       Physical Exam   Constitutional: She is oriented to person, place, and time  She appears well-developed  HENT:   Head: Normocephalic and atraumatic  Eyes: Pupils are equal, round, and reactive to light  EOM are normal    Neck: Normal range of motion  Neurological: She is alert and oriented to person, place, and time  She has normal strength  A cranial nerve deficit is present  She displays a negative Romberg sign  Coordination and gait abnormal  GCS eye subscore is 4  GCS verbal subscore is 5  GCS motor subscore is 6  Reflex Scores:       Tricep reflexes are 2+ on the right side and 2+ on the left side  Bicep reflexes are 2+ on the right side and 2+ on the left side  Brachioradialis reflexes are 2+ on the right side and 2+ on the left side  Patellar reflexes are 2+ on the right side and 2+ on the left side  Achilles reflexes are 2+ on the right side and 2+ on the left side          RESULTS/DATA  MRI of the brain demonstrates a high left convexity non enhancing calcified mass wich is very low signal on MRI long sequences consistent with a calcified mass or exostosis

## 2018-11-09 DIAGNOSIS — Z12.39 SCREENING FOR MALIGNANT NEOPLASM OF BREAST: Primary | ICD-10-CM

## 2018-11-12 ENCOUNTER — HOSPITAL ENCOUNTER (OUTPATIENT)
Dept: RADIOLOGY | Age: 48
Discharge: HOME | End: 2018-11-12
Payer: COMMERCIAL

## 2018-11-12 VITALS — HEIGHT: 66 IN | WEIGHT: 165 LBS | BODY MASS INDEX: 26.52 KG/M2

## 2018-11-12 DIAGNOSIS — Z12.31 ENCOUNTER FOR SCREENING MAMMOGRAM FOR BREAST CANCER: ICD-10-CM

## 2018-11-12 PROCEDURE — 77067 SCR MAMMO BI INCL CAD: CPT

## 2018-11-12 PROCEDURE — 77063 BREAST TOMOSYNTHESIS BI: CPT

## 2018-12-27 DIAGNOSIS — F32.A DEPRESSION, UNSPECIFIED DEPRESSION TYPE: ICD-10-CM

## 2018-12-27 RX ORDER — CITALOPRAM 40 MG/1
40 TABLET ORAL DAILY
Qty: 90 TABLET | Refills: 0 | Status: SHIPPED | OUTPATIENT
Start: 2018-12-27 | End: 2019-06-12 | Stop reason: SDUPTHER

## 2019-03-18 DIAGNOSIS — B35.1 DERMATOPHYTOSIS OF NAIL: Primary | ICD-10-CM

## 2019-03-18 RX ORDER — TERBINAFINE HYDROCHLORIDE 250 MG/1
250 TABLET ORAL DAILY
Qty: 90 TABLET | Refills: 0 | Status: SHIPPED | OUTPATIENT
Start: 2019-03-18 | End: 2019-06-16

## 2019-03-19 ENCOUNTER — TRANSCRIBE ORDERS (OUTPATIENT)
Dept: LAB | Facility: CLINIC | Age: 49
End: 2019-03-19

## 2019-03-19 ENCOUNTER — APPOINTMENT (OUTPATIENT)
Dept: LAB | Facility: CLINIC | Age: 49
End: 2019-03-19
Payer: COMMERCIAL

## 2019-03-19 DIAGNOSIS — B35.1 DERMATOPHYTOSIS OF NAIL: ICD-10-CM

## 2019-03-19 DIAGNOSIS — B35.1 DERMATOPHYTOSIS OF NAIL: Primary | ICD-10-CM

## 2019-03-19 LAB
ALBUMIN SERPL BCP-MCNC: 4 G/DL (ref 3.5–5)
ALP SERPL-CCNC: 44 U/L (ref 46–116)
ALT SERPL W P-5'-P-CCNC: 16 U/L (ref 12–78)
ANION GAP SERPL CALCULATED.3IONS-SCNC: 4 MMOL/L (ref 4–13)
AST SERPL W P-5'-P-CCNC: 18 U/L (ref 5–45)
BILIRUB DIRECT SERPL-MCNC: 0.16 MG/DL (ref 0–0.2)
BILIRUB SERPL-MCNC: 0.71 MG/DL (ref 0.2–1)
BUN SERPL-MCNC: 11 MG/DL (ref 5–25)
CALCIUM SERPL-MCNC: 9 MG/DL (ref 8.3–10.1)
CHLORIDE SERPL-SCNC: 109 MMOL/L (ref 100–108)
CO2 SERPL-SCNC: 24 MMOL/L (ref 21–32)
CREAT SERPL-MCNC: 0.85 MG/DL (ref 0.6–1.3)
GFR SERPL CREATININE-BSD FRML MDRD: 81 ML/MIN/1.73SQ M
GLUCOSE P FAST SERPL-MCNC: 90 MG/DL (ref 65–99)
POTASSIUM SERPL-SCNC: 3.9 MMOL/L (ref 3.5–5.3)
PROT SERPL-MCNC: 7.6 G/DL (ref 6.4–8.2)
SODIUM SERPL-SCNC: 137 MMOL/L (ref 136–145)

## 2019-03-19 PROCEDURE — 82248 BILIRUBIN DIRECT: CPT

## 2019-03-19 PROCEDURE — 80053 COMPREHEN METABOLIC PANEL: CPT

## 2019-03-19 PROCEDURE — 36415 COLL VENOUS BLD VENIPUNCTURE: CPT

## 2019-04-12 ENCOUNTER — TELEPHONE (OUTPATIENT)
Dept: INTERNAL MEDICINE CLINIC | Facility: CLINIC | Age: 49
End: 2019-04-12

## 2019-04-12 DIAGNOSIS — Z12.31 ENCOUNTER FOR SCREENING MAMMOGRAM FOR BREAST CANCER: Primary | ICD-10-CM

## 2019-04-29 ENCOUNTER — TRANSCRIBE ORDERS (OUTPATIENT)
Dept: LAB | Facility: CLINIC | Age: 49
End: 2019-04-29

## 2019-04-29 ENCOUNTER — APPOINTMENT (OUTPATIENT)
Dept: LAB | Facility: CLINIC | Age: 49
End: 2019-04-29
Payer: COMMERCIAL

## 2019-04-29 DIAGNOSIS — B35.1 DERMATOPHYTOSIS OF NAIL: ICD-10-CM

## 2019-04-29 DIAGNOSIS — B35.1 DERMATOPHYTOSIS OF NAIL: Primary | ICD-10-CM

## 2019-04-29 LAB
ALBUMIN SERPL BCP-MCNC: 4 G/DL (ref 3.5–5)
ALP SERPL-CCNC: 39 U/L (ref 46–116)
ALT SERPL W P-5'-P-CCNC: 12 U/L (ref 12–78)
ANION GAP SERPL CALCULATED.3IONS-SCNC: 5 MMOL/L (ref 4–13)
AST SERPL W P-5'-P-CCNC: 18 U/L (ref 5–45)
BASOPHILS # BLD AUTO: 0.05 THOUSANDS/ΜL (ref 0–0.1)
BASOPHILS NFR BLD AUTO: 1 % (ref 0–1)
BILIRUB DIRECT SERPL-MCNC: 0.17 MG/DL (ref 0–0.2)
BILIRUB SERPL-MCNC: 0.84 MG/DL (ref 0.2–1)
BUN SERPL-MCNC: 17 MG/DL (ref 5–25)
CALCIUM SERPL-MCNC: 8.9 MG/DL (ref 8.3–10.1)
CHLORIDE SERPL-SCNC: 106 MMOL/L (ref 100–108)
CO2 SERPL-SCNC: 24 MMOL/L (ref 21–32)
CREAT SERPL-MCNC: 1.12 MG/DL (ref 0.6–1.3)
EOSINOPHIL # BLD AUTO: 0.17 THOUSAND/ΜL (ref 0–0.61)
EOSINOPHIL NFR BLD AUTO: 3 % (ref 0–6)
ERYTHROCYTE [DISTWIDTH] IN BLOOD BY AUTOMATED COUNT: 12.4 % (ref 11.6–15.1)
GFR SERPL CREATININE-BSD FRML MDRD: 58 ML/MIN/1.73SQ M
GLUCOSE P FAST SERPL-MCNC: 80 MG/DL (ref 65–99)
HCT VFR BLD AUTO: 43.3 % (ref 34.8–46.1)
HGB BLD-MCNC: 14.1 G/DL (ref 11.5–15.4)
IMM GRANULOCYTES # BLD AUTO: 0.02 THOUSAND/UL (ref 0–0.2)
IMM GRANULOCYTES NFR BLD AUTO: 0 % (ref 0–2)
LYMPHOCYTES # BLD AUTO: 1.3 THOUSANDS/ΜL (ref 0.6–4.47)
LYMPHOCYTES NFR BLD AUTO: 20 % (ref 14–44)
MCH RBC QN AUTO: 33.4 PG (ref 26.8–34.3)
MCHC RBC AUTO-ENTMCNC: 32.6 G/DL (ref 31.4–37.4)
MCV RBC AUTO: 103 FL (ref 82–98)
MONOCYTES # BLD AUTO: 0.56 THOUSAND/ΜL (ref 0.17–1.22)
MONOCYTES NFR BLD AUTO: 9 % (ref 4–12)
NEUTROPHILS # BLD AUTO: 4.3 THOUSANDS/ΜL (ref 1.85–7.62)
NEUTS SEG NFR BLD AUTO: 67 % (ref 43–75)
NRBC BLD AUTO-RTO: 0 /100 WBCS
PLATELET # BLD AUTO: 248 THOUSANDS/UL (ref 149–390)
PMV BLD AUTO: 11.3 FL (ref 8.9–12.7)
POTASSIUM SERPL-SCNC: 4 MMOL/L (ref 3.5–5.3)
PROT SERPL-MCNC: 7.5 G/DL (ref 6.4–8.2)
RBC # BLD AUTO: 4.22 MILLION/UL (ref 3.81–5.12)
SODIUM SERPL-SCNC: 135 MMOL/L (ref 136–145)
WBC # BLD AUTO: 6.4 THOUSAND/UL (ref 4.31–10.16)

## 2019-04-29 PROCEDURE — 36415 COLL VENOUS BLD VENIPUNCTURE: CPT

## 2019-04-29 PROCEDURE — 80053 COMPREHEN METABOLIC PANEL: CPT

## 2019-04-29 PROCEDURE — 85025 COMPLETE CBC W/AUTO DIFF WBC: CPT

## 2019-04-29 PROCEDURE — 82248 BILIRUBIN DIRECT: CPT

## 2019-06-12 DIAGNOSIS — F32.A DEPRESSION, UNSPECIFIED DEPRESSION TYPE: ICD-10-CM

## 2019-06-12 DIAGNOSIS — G43.809 OTHER MIGRAINE WITHOUT STATUS MIGRAINOSUS, NOT INTRACTABLE: ICD-10-CM

## 2019-06-12 RX ORDER — TOPIRAMATE 100 MG/1
TABLET, FILM COATED ORAL
Qty: 90 TABLET | Refills: 0 | Status: SHIPPED | OUTPATIENT
Start: 2019-06-12 | End: 2019-09-18 | Stop reason: SDUPTHER

## 2019-06-12 RX ORDER — CITALOPRAM 40 MG/1
TABLET ORAL
Qty: 90 TABLET | Refills: 0 | Status: SHIPPED | OUTPATIENT
Start: 2019-06-12 | End: 2019-09-11 | Stop reason: SDUPTHER

## 2019-06-28 PROBLEM — K59.01 CONSTIPATION BY DELAYED COLONIC TRANSIT: Status: ACTIVE | Noted: 2019-06-28

## 2019-06-28 PROBLEM — K59.01 CONSTIPATION BY DELAYED COLONIC TRANSIT: Status: RESOLVED | Noted: 2019-06-28 | Resolved: 2019-06-28

## 2019-07-24 ENCOUNTER — OFFICE VISIT (OUTPATIENT)
Dept: URGENT CARE | Age: 49
End: 2019-07-24
Payer: COMMERCIAL

## 2019-07-24 VITALS
BODY MASS INDEX: 26.2 KG/M2 | HEIGHT: 66 IN | DIASTOLIC BLOOD PRESSURE: 89 MMHG | SYSTOLIC BLOOD PRESSURE: 123 MMHG | RESPIRATION RATE: 16 BRPM | WEIGHT: 163 LBS | OXYGEN SATURATION: 99 % | HEART RATE: 92 BPM | TEMPERATURE: 98.1 F

## 2019-07-24 DIAGNOSIS — R39.89 POSSIBLE URINARY TRACT INFECTION: Primary | ICD-10-CM

## 2019-07-24 LAB
SL AMB  POCT GLUCOSE, UA: NEGATIVE
SL AMB LEUKOCYTE ESTERASE,UA: NEGATIVE
SL AMB POCT BILIRUBIN,UA: NEGATIVE
SL AMB POCT BLOOD,UA: NEGATIVE
SL AMB POCT CLARITY,UA: CLEAR
SL AMB POCT COLOR,UA: YELLOW
SL AMB POCT KETONES,UA: NEGATIVE
SL AMB POCT NITRITE,UA: NEGATIVE
SL AMB POCT PH,UA: 7.5
SL AMB POCT SPECIFIC GRAVITY,UA: 1.01
SL AMB POCT URINE PROTEIN: NEGATIVE
SL AMB POCT UROBILINOGEN: 0.2

## 2019-07-24 PROCEDURE — 81002 URINALYSIS NONAUTO W/O SCOPE: CPT | Performed by: PHYSICIAN ASSISTANT

## 2019-07-24 PROCEDURE — 87086 URINE CULTURE/COLONY COUNT: CPT | Performed by: PHYSICIAN ASSISTANT

## 2019-07-24 PROCEDURE — 99213 OFFICE O/P EST LOW 20 MIN: CPT | Performed by: FAMILY MEDICINE

## 2019-07-24 PROCEDURE — S9088 SERVICES PROVIDED IN URGENT: HCPCS | Performed by: FAMILY MEDICINE

## 2019-07-24 RX ORDER — UBIDECARENONE 75 MG
CAPSULE ORAL DAILY
COMMUNITY
End: 2019-10-25

## 2019-07-24 NOTE — PROGRESS NOTES
Assessment/Plan    Possible urinary tract infection [R39 89]  1  Possible urinary tract infection  POCT urine dip    Urine culture     Will culture urine sample  Recommend OTC symptom relief including Azo & ibuprofen for pain  Follow up with PCP in 3-5 days  Go to ER if severe pain, chills, or vomiting develop    Subjective:     Patient ID: Karthik Pro is a 51 y o  female  Reason For Visit / Chief Complaint  Chief Complaint   Patient presents with    Possible UTI     Pt here today with low abdominal pressure, bloating, nausea, dizziness and interupted stream when urinating  She states these are similar symptoms to when she had a UTI in the past         Patient presents with complaint of possible UTI x 2 days  Pt states that she has been experiencing interrupted stream for the past 2 days and woke up this AM with pelvic pain and pressure, dizziness, nausea, and bloating  Pt also has decreased appetite  Pt states that these are the symptoms she has had with prior UTIs  Pt states that she has not had a UTI in years but has never had dysuria, frequency, urgency, and foul odor with prior UTIs  Pt denies known antibiotic allergies or recent antibiotic use  Pt denies fever, chills, night sweats, back/flank pain, vomiting, chest pain, and dyspnea  Pt reports that she has 3 kidney stones in each kidney and is seen by a urologist for that reason  Past Medical History:   Diagnosis Date    Asthma     Last assessed 9/25/2014    Eczema        Past Surgical History:   Procedure Laterality Date    HYSTERECTOMY  2015    TONSILLECTOMY AND ADENOIDECTOMY         Family History   Problem Relation Age of Onset    Hypertension Mother     Colon cancer Father        Review of Systems   Constitutional: Positive for appetite change  Negative for chills, fatigue and fever  Respiratory: Negative for cough, chest tightness, shortness of breath and wheezing  Cardiovascular: Negative for chest pain and palpitations  Gastrointestinal: Positive for nausea  Negative for abdominal pain, diarrhea and vomiting  Genitourinary: Positive for pelvic pain  Negative for dysuria, flank pain, frequency, hematuria and urgency  Musculoskeletal: Negative for myalgias, neck pain and neck stiffness  Skin: Negative for color change, rash and wound  Neurological: Positive for dizziness  Negative for weakness, light-headedness, numbness and headaches  All other systems reviewed and are negative  Objective:    /89 (BP Location: Right arm, Patient Position: Sitting)   Pulse 92   Temp 98 1 °F (36 7 °C) (Temporal)   Resp 16   Ht 5' 6" (1 676 m)   Wt 73 9 kg (163 lb)   SpO2 99%   BMI 26 31 kg/m²     Physical Exam   Constitutional: She is oriented to person, place, and time  She appears well-developed and well-nourished  No distress  HENT:   Head: Normocephalic and atraumatic  Right Ear: External ear normal    Left Ear: External ear normal    Nose: Nose normal    Mouth/Throat: Oropharynx is clear and moist    Eyes: Pupils are equal, round, and reactive to light  Conjunctivae and EOM are normal    Neck: Normal range of motion  Neck supple  Cardiovascular: Normal rate, regular rhythm and normal heart sounds  Pulmonary/Chest: Effort normal and breath sounds normal  No respiratory distress  She has no wheezes  Abdominal: Soft  Bowel sounds are normal  She exhibits no distension  There is no tenderness  Genitourinary:   Genitourinary Comments: No CVA tenderness   Lymphadenopathy:     She has no cervical adenopathy  Neurological: She is alert and oriented to person, place, and time  No cranial nerve deficit or sensory deficit  Skin: Skin is warm and dry  Capillary refill takes less than 2 seconds  No rash noted  She is not diaphoretic  Psychiatric: She has a normal mood and affect  Her behavior is normal  Thought content normal    Nursing note and vitals reviewed

## 2019-07-25 ENCOUNTER — HOSPITAL ENCOUNTER (OUTPATIENT)
Dept: RADIOLOGY | Facility: HOSPITAL | Age: 49
Discharge: HOME | End: 2019-07-25
Payer: COMMERCIAL

## 2019-07-25 ENCOUNTER — OFFICE VISIT (OUTPATIENT)
Dept: OBGYN CLINIC | Facility: CLINIC | Age: 49
End: 2019-07-25
Payer: COMMERCIAL

## 2019-07-25 VITALS
HEIGHT: 66 IN | SYSTOLIC BLOOD PRESSURE: 118 MMHG | BODY MASS INDEX: 26.36 KG/M2 | DIASTOLIC BLOOD PRESSURE: 78 MMHG | WEIGHT: 164 LBS

## 2019-07-25 DIAGNOSIS — R10.32 LLQ PAIN: ICD-10-CM

## 2019-07-25 DIAGNOSIS — R10.32 LLQ PAIN: Primary | ICD-10-CM

## 2019-07-25 PROCEDURE — 99213 OFFICE O/P EST LOW 20 MIN: CPT | Performed by: PHYSICIAN ASSISTANT

## 2019-07-25 PROCEDURE — 76830 TRANSVAGINAL US NON-OB: CPT

## 2019-07-25 PROCEDURE — 76856 US EXAM PELVIC COMPLETE: CPT

## 2019-07-25 PROCEDURE — 81002 URINALYSIS NONAUTO W/O SCOPE: CPT | Performed by: PHYSICIAN ASSISTANT

## 2019-07-25 NOTE — PROGRESS NOTES
Assessment/Plan:    No problem-specific Assessment & Plan notes found for this encounter  Diagnoses and all orders for this visit:    LLQ pain  -     US pelvis complete non OB; Future          Subjective:      Patient ID: Saul Sweeney is a 51 y o  female  Pt is here for a problem visit--  For the last 24--48 hours, pt has had pelvic pain  It started more central, but has localized to the left side  She had a hyster several years ago for heavy bleeding  She denies any dysuria, or hematuria  She does have a history of b/l kidney stones  Heat and nsaids have been giving pt some relief  Was seen in urgent care yesterday and urine was negative  Was told if pain no better/worse to go to ER  Pain is worse today      The following portions of the patient's history were reviewed and updated as appropriate: allergies, current medications, past family history, past medical history, past social history, past surgical history and problem list     Review of Systems   Constitutional: Negative for chills, fever and unexpected weight change  Gastrointestinal: Negative for abdominal pain, blood in stool, constipation and diarrhea  Genitourinary: Positive for pelvic pain  Negative for dysuria, vaginal bleeding and vaginal discharge  Objective:      /78 (BP Location: Right arm, Patient Position: Sitting, Cuff Size: Standard)   Ht 5' 6" (1 676 m)   Wt 74 4 kg (164 lb)   BMI 26 47 kg/m²          Physical Exam   Constitutional: She appears well-developed and well-nourished  Genitourinary: Vagina normal  Pelvic exam was performed with patient supine  There is no rash or lesion on the right labia  There is no rash or lesion on the left labia  Cervix exhibits no motion tenderness, no discharge and no friability  Left adnexum displays tenderness and fullness  Lymphadenopathy: No inguinal adenopathy noted on the right or left side  Nursing note and vitals reviewed

## 2019-07-25 NOTE — PROGRESS NOTES
Patient was seen at urgent care yesterday, was diagnosed with an ovarian cyst  Urine was sent out and is still pending, in office urine immanuel was negative  patine states she has LLQ pain today, yesterday pain was more centralized  She also feels "full" and bloated  Having pressure, also pain with "pushing"/moving her bowels       HYSTER 2014  Urine culture: Pending  Urine dip in office: Negative

## 2019-07-26 LAB — BACTERIA UR CULT: NORMAL

## 2019-07-30 ENCOUNTER — TELEPHONE (OUTPATIENT)
Dept: URGENT CARE | Facility: CLINIC | Age: 49
End: 2019-07-30

## 2019-07-30 ENCOUNTER — TRANSCRIBE ORDERS (OUTPATIENT)
Dept: OBGYN CLINIC | Facility: CLINIC | Age: 49
End: 2019-07-30

## 2019-07-30 DIAGNOSIS — N83.202 LEFT OVARIAN CYST: Primary | ICD-10-CM

## 2019-09-11 DIAGNOSIS — F32.A DEPRESSION, UNSPECIFIED DEPRESSION TYPE: ICD-10-CM

## 2019-09-11 RX ORDER — CITALOPRAM 40 MG/1
40 TABLET ORAL DAILY
Qty: 90 TABLET | Refills: 0 | Status: SHIPPED | OUTPATIENT
Start: 2019-09-11 | End: 2019-09-18 | Stop reason: SDUPTHER

## 2019-09-18 DIAGNOSIS — G43.809 OTHER MIGRAINE WITHOUT STATUS MIGRAINOSUS, NOT INTRACTABLE: ICD-10-CM

## 2019-09-18 DIAGNOSIS — F32.A DEPRESSION, UNSPECIFIED DEPRESSION TYPE: ICD-10-CM

## 2019-09-18 RX ORDER — TOPIRAMATE 100 MG/1
100 TABLET, FILM COATED ORAL DAILY
Qty: 90 TABLET | Refills: 0 | Status: SHIPPED | OUTPATIENT
Start: 2019-09-18 | End: 2019-09-20 | Stop reason: SDUPTHER

## 2019-09-18 RX ORDER — CITALOPRAM 40 MG/1
40 TABLET ORAL DAILY
Qty: 90 TABLET | Refills: 0 | Status: SHIPPED | OUTPATIENT
Start: 2019-09-18 | End: 2019-10-25 | Stop reason: SDUPTHER

## 2019-09-19 ENCOUNTER — TELEPHONE (OUTPATIENT)
Dept: OTHER | Facility: OTHER | Age: 49
End: 2019-09-19

## 2019-09-19 NOTE — TELEPHONE ENCOUNTER
PT  Called in upset because every time she goes to  her medication it is never available for    PT states it should not be this hard for her to  her medication, all she asks is that the doctor sign off on her medications when she calls and makes a request

## 2019-09-20 DIAGNOSIS — G43.809 OTHER MIGRAINE WITHOUT STATUS MIGRAINOSUS, NOT INTRACTABLE: ICD-10-CM

## 2019-09-20 RX ORDER — TOPIRAMATE 100 MG/1
100 TABLET, FILM COATED ORAL DAILY
Qty: 90 TABLET | Refills: 0 | Status: SHIPPED | OUTPATIENT
Start: 2019-09-20 | End: 2019-10-25 | Stop reason: SDUPTHER

## 2019-09-20 NOTE — TELEPHONE ENCOUNTER
Script was previously set to print so it never went to the pharmacy    Pt very upset about each time she needs a refill this happens

## 2019-09-26 ENCOUNTER — HOSPITAL ENCOUNTER (OUTPATIENT)
Dept: RADIOLOGY | Facility: HOSPITAL | Age: 49
Discharge: HOME | End: 2019-09-26
Attending: NEUROLOGICAL SURGERY
Payer: COMMERCIAL

## 2019-09-26 DIAGNOSIS — M89.8X8 MASS OF SKULL: ICD-10-CM

## 2019-09-26 DIAGNOSIS — D32.9 MENINGIOMA (HCC): ICD-10-CM

## 2019-09-26 PROCEDURE — A9585 GADOBUTROL INJECTION: HCPCS | Performed by: NEUROLOGICAL SURGERY

## 2019-09-26 PROCEDURE — 70553 MRI BRAIN STEM W/O & W/DYE: CPT

## 2019-09-26 RX ADMIN — GADOBUTROL 7 ML: 604.72 INJECTION INTRAVENOUS at 19:04

## 2019-10-03 ENCOUNTER — TELEPHONE (OUTPATIENT)
Dept: NEUROSURGERY | Facility: CLINIC | Age: 49
End: 2019-10-03

## 2019-10-03 NOTE — TELEPHONE ENCOUNTER
Attempted to reach patient via phone to obtain more information with no answer  LMOM for call back at her convenience

## 2019-10-03 NOTE — TELEPHONE ENCOUNTER
----- Message from [facility] sent at 10/2/2019  7:39 PM EDT -----  Regarding: RE: Test Results Question  Contact: [phone]  Monica Mazariegos  Is there a standing order for repeating the MRI next year? Thank you   ----- Message -----  From: Nurse Rocío MADISON  Sent: 10/1/2019  4:33 PM EDT  To: [facility]  Subject: RE: Test Results Question  Good afternoon Liss Last,     I forwarded your request to Dr Brodie Edwards for review  At this time he thinks the MRI will provide the most useful information and does not recommend a CT scan  Please feel free to contact the office with any questions or concerns  Thank you,   Sola Chowdary RN      ----- Message -----     From: [facility]     Sent: 9/30/2019  6:27 PM EDT       To: Karlene Nobles MD  Subject: Test Results Question    Re: 9/27/19 MRI, would you write script for CT as suggested in report?

## 2019-10-03 NOTE — TELEPHONE ENCOUNTER
Patient had MRI brain done end of sept but no follow up scheduled in the office  Spoke with Sharmaine Ricardo who recommended bringing patient in with a PA on a day when DKO is in the office  Patient scheduled for 10/31 @ 945am   Patient in agreement and appreciative for the assistance

## 2019-10-25 ENCOUNTER — OFFICE VISIT (OUTPATIENT)
Dept: FAMILY MEDICINE CLINIC | Facility: CLINIC | Age: 49
End: 2019-10-25
Payer: COMMERCIAL

## 2019-10-25 VITALS
SYSTOLIC BLOOD PRESSURE: 116 MMHG | HEIGHT: 65 IN | TEMPERATURE: 98.7 F | HEART RATE: 75 BPM | RESPIRATION RATE: 16 BRPM | DIASTOLIC BLOOD PRESSURE: 78 MMHG | WEIGHT: 164 LBS | BODY MASS INDEX: 27.32 KG/M2 | OXYGEN SATURATION: 94 %

## 2019-10-25 DIAGNOSIS — G43.809 OTHER MIGRAINE WITHOUT STATUS MIGRAINOSUS, NOT INTRACTABLE: Primary | ICD-10-CM

## 2019-10-25 DIAGNOSIS — M25.511 CHRONIC RIGHT SHOULDER PAIN: ICD-10-CM

## 2019-10-25 DIAGNOSIS — F32.A DEPRESSION, UNSPECIFIED DEPRESSION TYPE: ICD-10-CM

## 2019-10-25 DIAGNOSIS — G89.29 CHRONIC RIGHT SHOULDER PAIN: ICD-10-CM

## 2019-10-25 DIAGNOSIS — D32.9 MENINGIOMA (HCC): ICD-10-CM

## 2019-10-25 DIAGNOSIS — Z12.31 ENCOUNTER FOR SCREENING MAMMOGRAM FOR MALIGNANT NEOPLASM OF BREAST: ICD-10-CM

## 2019-10-25 PROCEDURE — 99204 OFFICE O/P NEW MOD 45 MIN: CPT | Performed by: NURSE PRACTITIONER

## 2019-10-25 RX ORDER — VITAMIN B COMPLEX
1 CAPSULE ORAL DAILY
COMMUNITY

## 2019-10-25 RX ORDER — TOPIRAMATE 100 MG/1
100 TABLET, FILM COATED ORAL DAILY
Qty: 90 TABLET | Refills: 3 | Status: SHIPPED | OUTPATIENT
Start: 2019-10-25 | End: 2020-12-08

## 2019-10-25 RX ORDER — CITALOPRAM 40 MG/1
40 TABLET ORAL DAILY
Qty: 90 TABLET | Refills: 3 | Status: SHIPPED | OUTPATIENT
Start: 2019-10-25 | End: 2020-12-21

## 2019-10-25 NOTE — PROGRESS NOTES
Assessment/Plan:           Problem List Items Addressed This Visit        Cardiovascular and Mediastinum    Migraine - Primary    Relevant Medications    citalopram (CeleXA) 40 mg tablet    topiramate (TOPAMAX) 100 mg tablet       Nervous and Auditory    Meningioma (HCC)     Cont f/u with neurosurgery            Other    Depression    Relevant Medications    citalopram (CeleXA) 40 mg tablet      Other Visit Diagnoses     Chronic right shoulder pain        Relevant Orders    XR shoulder 2+ vw right    Encounter for screening mammogram for malignant neoplasm of breast        Relevant Orders    Mammo screening bilateral w 3d & cad            Subjective:      Patient ID: Sendy Lloyd is a 51 y o  female  Here for f/u to chronic medical conditions  Has meningioma of brain and following with neurosurgery  Has hx of renal calculi and following with urology  Hx of hysterectomy, cyst on left ovary  topamax and celexa doing well with her mental health  All stable  Saw rheumatology and all negative per patient  C/o right shoulder pain  No known trauma or injury  Denies numbness or tingling of right arm  Not dropping items      The following portions of the patient's history were reviewed and updated as appropriate: allergies, current medications, past family history, past medical history, past social history, past surgical history and problem list     Review of Systems   Constitutional: Negative for fatigue and fever  HENT: Negative for congestion and postnasal drip  Eyes: Negative for photophobia and visual disturbance  Respiratory: Negative for cough and wheezing  Cardiovascular: Negative for chest pain and palpitations  Gastrointestinal: Negative for constipation and diarrhea  Genitourinary: Negative for dysuria and frequency  Musculoskeletal: Negative for arthralgias and myalgias  Skin: Negative for rash  Neurological: Negative for dizziness, light-headedness and headaches     Hematological: Negative for adenopathy  Psychiatric/Behavioral: Negative for dysphoric mood  The patient is not nervous/anxious            Objective:      /78 (BP Location: Left arm, Patient Position: Sitting, Cuff Size: Standard)   Pulse 75   Temp 98 7 °F (37 1 °C) (Tympanic)   Resp 16   Ht 5' 5 24" (1 657 m)   Wt 74 4 kg (164 lb)   SpO2 94%   BMI 27 09 kg/m²       Family History   Problem Relation Age of Onset    Hypertension Mother     Colon cancer Father      Past Medical History:   Diagnosis Date    Asthma     Last assessed 9/25/2014    Eczema      Social History     Socioeconomic History    Marital status: /Civil Union     Spouse name: Not on file    Number of children: Not on file    Years of education: Not on file    Highest education level: Not on file   Occupational History    Not on file   Social Needs    Financial resource strain: Not on file    Food insecurity:     Worry: Not on file     Inability: Not on file    Transportation needs:     Medical: Not on file     Non-medical: Not on file   Tobacco Use    Smoking status: Former Smoker    Smokeless tobacco: Never Used   Substance and Sexual Activity    Alcohol use: Yes     Comment: Social    Drug use: Never    Sexual activity: Yes     Partners: Male   Lifestyle    Physical activity:     Days per week: Not on file     Minutes per session: Not on file    Stress: Not on file   Relationships    Social connections:     Talks on phone: Not on file     Gets together: Not on file     Attends Taoism service: Not on file     Active member of club or organization: Not on file     Attends meetings of clubs or organizations: Not on file     Relationship status: Not on file    Intimate partner violence:     Fear of current or ex partner: Not on file     Emotionally abused: Not on file     Physically abused: Not on file     Forced sexual activity: Not on file   Other Topics Concern    Not on file   Social History Narrative    Not on file Current Outpatient Medications:     ALPRAZolam (XANAX) 0 25 mg tablet, Take 1 tablet (0 25 mg total) by mouth 3 (three) times a day as needed for anxiety (Patient taking differently: Take 0 25 mg by mouth as needed for anxiety  ), Disp: 30 tablet, Rfl: 0    b complex vitamins capsule, Take 1 capsule by mouth daily, Disp: , Rfl:     Cholecalciferol (VITAMIN D3) 2000 units capsule, Take 2 capsules by mouth daily  , Disp: , Rfl:     citalopram (CeleXA) 40 mg tablet, Take 1 tablet (40 mg total) by mouth daily, Disp: 90 tablet, Rfl: 3    DUPIXENT 300 MG/2ML SOSY, , Disp: , Rfl:     fluticasone (FLONASE) 50 mcg/act nasal spray, 2 sprays into each nostril daily, Disp: , Rfl:     lubiprostone (AMITIZA) 24 mcg capsule, Take 1 capsule (24 mcg total) by mouth 2 (two) times a day with meals, Disp: 180 capsule, Rfl: 4    topiramate (TOPAMAX) 100 mg tablet, Take 1 tablet (100 mg total) by mouth daily, Disp: 90 tablet, Rfl: 3    valACYclovir (VALTREX) 500 mg tablet, Take 500 mg by mouth daily  , Disp: , Rfl:     AMITIZA 24 MCG capsule, Take 1 capsule (24 mcg total) by mouth 2 (two) times a day with meals for 30 days, Disp: 60 capsule, Rfl: 0  Allergies   Allergen Reactions    Other      SEASONAL       Physical Exam   Constitutional: She is oriented to person, place, and time  She appears well-developed and well-nourished  HENT:   Head: Normocephalic  Right Ear: External ear normal    Left Ear: External ear normal    Nose: Nose normal    Mouth/Throat: Oropharynx is clear and moist    Eyes: Conjunctivae are normal    Neck: Normal range of motion  Neck supple  Cardiovascular: Normal rate, regular rhythm and normal heart sounds  Pulmonary/Chest: Effort normal and breath sounds normal    Abdominal: Soft  Bowel sounds are normal    Musculoskeletal: Normal range of motion  Neurological: She is alert and oriented to person, place, and time  Skin: Skin is warm and dry   Capillary refill takes less than 2 seconds  Psychiatric: She has a normal mood and affect  Her behavior is normal  Judgment and thought content normal    Nursing note and vitals reviewed  BMI Counseling: Body mass index is 27 09 kg/m²  The BMI is above normal  Nutrition recommendations include reducing portion sizes, decreasing overall calorie intake, 3-5 servings of fruits/vegetables daily, reducing fast food intake, consuming healthier snacks, decreasing soda and/or juice intake, moderation in carbohydrate intake, increasing intake of lean protein, reducing intake of saturated fat and trans fat and reducing intake of cholesterol  Exercise recommendations include moderate aerobic physical activity for 150 minutes/week, exercising 3-5 times per week and strength training exercises

## 2019-10-31 ENCOUNTER — OFFICE VISIT (OUTPATIENT)
Dept: NEUROSURGERY | Facility: CLINIC | Age: 49
End: 2019-10-31
Payer: COMMERCIAL

## 2019-10-31 VITALS
SYSTOLIC BLOOD PRESSURE: 104 MMHG | TEMPERATURE: 98.5 F | HEIGHT: 65 IN | HEART RATE: 68 BPM | RESPIRATION RATE: 16 BRPM | BODY MASS INDEX: 27.66 KG/M2 | DIASTOLIC BLOOD PRESSURE: 76 MMHG | WEIGHT: 166 LBS

## 2019-10-31 DIAGNOSIS — D32.9 MENINGIOMA (HCC): Primary | ICD-10-CM

## 2019-10-31 PROCEDURE — 99213 OFFICE O/P EST LOW 20 MIN: CPT | Performed by: PHYSICIAN ASSISTANT

## 2019-10-31 NOTE — PATIENT INSTRUCTIONS
Further follow-up as discussed will be in approximately 5 years with Dr Jad Garcia)    As Radiology suggested this will be more clearly evaluated with a CT scan, CT head with without contrast is planned a few days prior to follow-up visit  Should you wish to follow-up sooner call [facility] to planned

## 2019-10-31 NOTE — LETTER
October 31, 2019     Paul Cabrales [address] [facility]  [address] [address]    Patient: Richa Navarro   YOB: 1970   Date of Visit: 10/31/2019       Dear Dr Dove Persons:    Thank you for referring Richa Navarro to me for evaluation  Below are my notes for this consultation  If you have questions, please do not hesitate to call me  I look forward to following your patient along with you  Sincerely,        Narcisa Hines MD        CC: No Recipients  Yoanna Stratton PA-C  10/31/2019 10:17 AM  Sign at close encounter  Patient ID: Richa Navarro is a 51 y o  female  Diagnoses and all orders for this visit:    Meningioma ([address] )  -     CT head with and without contrast; Future          Assessment/Plan:    Very pleasant 41-year-old female, [employer] hospital employee, 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-up, history of a meningioma  She reports approximately a year and half ago she had an episode of what she called "Brain Zaps "  She did some research on her own, and discovered that this can be related to medications, and she does take Celexa and Topamax, she asked the pharmacy did the manufacture changed, or perhaps did she missed a dose, as there is some correlation with these medications  She did not identify any of these changes at that time  She reports only the single episode and no subsequent events       She then saw her primary care provider who ordered multiple laboratory studies including an MRI of her brain, incidental note was made of a probable calcified meningioma  She had neurosurgical consultation with Dr Branden Viramontes who reassured her that this is very unlikely the cause of her symptoms and suggested follow-up periodically  He Provided a order for MRI at that visit suggested she consider repeating the study in a year or so which she has done      She returns today with updated MRI of the brain 9/26/19 the study was carefully reviewed in detail by Dr Branden Viramontes, and compared with prior study 10/24/18 the prior identified 8 x 6 x 11 mm mass, left frontal vertex remains unchanged when compared to prior study  Dr Jaya Lara did suggest that a CT head with and without contrast make provide a more clear picture of this mass and should be considered at next follow-up  The current study was reviewed with the patient  She otherwise denies gait or balance disturbance, motor or sensory difficulties in the upper lower extremities, bowel or bladder incontinence, difficulty with memory or mentation, difficulty with executive function  On examination today:  She is awake alert and oriented x3 there are no focal neurologic deficits identified, motor exam of the upper lower extremities with 5 x 5 for power gait and balance were normal in appearance and execution, cranial nerves are grossly intact  Further follow-up is advised in approximately 5 years, a CT head with and without contrast has been requested for this interval follow-up  She does understand should she notice any new changes or interval changes she should return sooner for reassessment  She may otherwise continue with all usual activities without restriction  These findings, impressions and recommendations are reviewed in great detail with the patient, she expressed understanding and agreement, her questions were answered completely and to her satisfaction  Follow up has been scheduled  Return in about 5 years (around 10/31/2024) for Follow-up in 5 years, review CT head with without contrast     Chief Complaint  One year follow-up to review MRI, offers no complaints today    HPI       The following portions of the patient's history were reviewed and updated as appropriate: allergies, current medications, past family history, past medical history, past social history and past surgical history  Review of Systems   Constitutional: Negative  HENT: Negative  Eyes: Negative  Respiratory: Negative  Cardiovascular: Negative  Gastrointestinal: Negative  Endocrine: Negative  Genitourinary: Negative  Musculoskeletal: Negative  Skin: Negative  Allergic/Immunologic: Negative  Neurological: Negative  Hematological: Negative  Psychiatric/Behavioral: Negative  Objective:    Physical Exam   Constitutional: She is oriented to person, place, and time  She appears well-developed and well-nourished  HENT:   Head: Normocephalic and atraumatic  Eyes: Pupils are equal, round, and reactive to light  EOM are normal    Cardiovascular: Normal rate  Pulmonary/Chest: Effort normal and breath sounds normal    Neurological: She is alert and oriented to person, place, and time  Skin: Skin is warm and dry  Psychiatric: She has a normal mood and affect  Vitals reviewed  Neurologic Exam     Mental Status   Oriented to person, place, and time  Cranial Nerves     CN III, IV, VI   Pupils are equal, round, and reactive to light  Extraocular motions are normal           MRI BRAIN WITH AND WITHOUT CONTRAST   9/26/19     INDICATION: Meningioma versus exostosis      COMPARISON:  MRI of the brain from October 24, 2018      TECHNIQUE:  Sagittal T1, axial T2, axial FLAIR, axial T1, axial T2*gradient  , axial diffusion  Sagittal, axial T1 postcontrast   Axial bravo postcontrast with coronal reconstructions           IV Contrast:  7 mL of gadobutrol injection (MULTI-DOSE)      IMAGE QUALITY:   Diagnostic      FINDINGS:     BRAIN PARENCHYMA:       At the left frontal vertex, mildly indenting the left motor cortex is a well-circumscribed 0 8 x 0 6 x 1 1 cm (measured on image 14 of series 12 and image 99 of series 11) nonenhancing extra-axial lesion along the inner table of the left frontal   calvarium  This is stable in appearance when compared to the prior study        There is no discrete parenchymal mass, mass effect or midline shift  There is no intracranial hemorrhage    Normal posterior fossa  Diffusion imaging is unremarkable        There are no white matter changes in the cerebral hemispheres      Postcontrast imaging of the brain demonstrates no abnormal enhancement      VENTRICLES:  Normal      SELLA AND PITUITARY GLAND:  Normal      ORBITS:  Normal      PARANASAL SINUSES:  Normal      VASCULATURE:  Evaluation of the major intracranial vasculature demonstrates appropriate flow voids      CALVARIUM AND SKULL BASE:  Normal      EXTRACRANIAL SOFT TISSUES:  Normal      IMPRESSION:     Stable 1 cm left frontal vertex extra-axial calcified lesion which may represent a 'burnt out' meningioma versus exostosis  CT imaging may be helpful in further distinguishing between the differential considerations

## 2019-10-31 NOTE — PROGRESS NOTES
Patient ID: Dane Tillman is a 51 y o  female  Diagnoses and all orders for this visit:    Meningioma ([facility] [address] )  -     CT head with and without contrast; Future          Assessment/Plan:    Very pleasant 26-year-old female, 74 [employer] employee, 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-up, history of a meningioma  She reports approximately a year and half ago she had an episode of what she called "Brain Zaps "  She did some research on her own, and discovered that this can be related to medications, and she does take Celexa and Topamax, she asked the pharmacy did the manufacture changed, or perhaps did she missed a dose, as there is some correlation with these medications  She did not identify any of these changes at that time  She reports only the single episode and no subsequent events       She then saw her primary care provider who ordered multiple laboratory studies including an MRI of her brain, incidental note was made of a probable calcified meningioma  She had neurosurgical consultation with Dr Abdi Szymanski who reassured her that this is very unlikely the cause of her symptoms and suggested follow-up periodically  He Provided a order for MRI at that visit suggested she consider repeating the study in a year or so which she has done  She returns today with updated MRI of the brain 9/26/19 the study was carefully reviewed in detail by Dr Abdi Szymanski, and compared with prior study 10/24/18 the prior identified 8 x 6 x 11 mm mass, left frontal vertex remains unchanged when compared to prior study  Dr Abdi Szymanski did suggest that a CT head with and without contrast make provide a more clear picture of this mass and should be considered at next follow-up  The current study was reviewed with the patient      She otherwise denies gait or balance disturbance, motor or sensory difficulties in the upper lower extremities, bowel or bladder incontinence, difficulty with memory or mentation, difficulty with executive function  On examination today:  She is awake alert and oriented x3 there are no focal neurologic deficits identified, motor exam of the upper lower extremities with 5 x 5 for power gait and balance were normal in appearance and execution, cranial nerves are grossly intact  Further follow-up is advised in approximately 5 years, a CT head with and without contrast has been requested for this interval follow-up  She does understand should she notice any new changes or interval changes she should return sooner for reassessment  She may otherwise continue with all usual activities without restriction  These findings, impressions and recommendations are reviewed in great detail with the patient, she expressed understanding and agreement, her questions were answered completely and to her satisfaction  Follow up has been scheduled  Return in about 5 years (around 10/31/2024) for Follow-up in 5 years, review CT head with without contrast     Chief Complaint  One year follow-up to review MRI, offers no complaints today    HPI       The following portions of the patient's history were reviewed and updated as appropriate: allergies, current medications, past family history, past medical history, past social history and past surgical history  Review of Systems   Constitutional: Negative  HENT: Negative  Eyes: Negative  Respiratory: Negative  Cardiovascular: Negative  Gastrointestinal: Negative  Endocrine: Negative  Genitourinary: Negative  Musculoskeletal: Negative  Skin: Negative  Allergic/Immunologic: Negative  Neurological: Negative  Hematological: Negative  Psychiatric/Behavioral: Negative  Objective:    Physical Exam   Constitutional: She is oriented to person, place, and time  She appears well-developed and well-nourished  HENT:   Head: Normocephalic and atraumatic  Eyes: Pupils are equal, round, and reactive to light   EOM are normal  Cardiovascular: Normal rate  Pulmonary/Chest: Effort normal and breath sounds normal    Neurological: She is alert and oriented to person, place, and time  Skin: Skin is warm and dry  Psychiatric: She has a normal mood and affect  Vitals reviewed  Neurologic Exam     Mental Status   Oriented to person, place, and time  Cranial Nerves     CN III, IV, VI   Pupils are equal, round, and reactive to light  Extraocular motions are normal           MRI BRAIN WITH AND WITHOUT CONTRAST   9/26/19     INDICATION: Meningioma versus exostosis      COMPARISON:  MRI of the brain from October 24, 2018      TECHNIQUE:  Sagittal T1, axial T2, axial FLAIR, axial T1, axial T2*gradient  , axial diffusion  Sagittal, axial T1 postcontrast   Axial bravo postcontrast with coronal reconstructions           IV Contrast:  7 mL of gadobutrol injection (MULTI-DOSE)      IMAGE QUALITY:   Diagnostic      FINDINGS:     BRAIN PARENCHYMA:       At the left frontal vertex, mildly indenting the left motor cortex is a well-circumscribed 0 8 x 0 6 x 1 1 cm (measured on image 14 of series 12 and image 99 of series 11) nonenhancing extra-axial lesion along the inner table of the left frontal   calvarium  This is stable in appearance when compared to the prior study        There is no discrete parenchymal mass, mass effect or midline shift  There is no intracranial hemorrhage  Normal posterior fossa    Diffusion imaging is unremarkable        There are no white matter changes in the cerebral hemispheres      Postcontrast imaging of the brain demonstrates no abnormal enhancement      VENTRICLES:  Normal      SELLA AND PITUITARY GLAND:  Normal      ORBITS:  Normal      PARANASAL SINUSES:  Normal      VASCULATURE:  Evaluation of the major intracranial vasculature demonstrates appropriate flow voids      CALVARIUM AND SKULL BASE:  Normal      EXTRACRANIAL SOFT TISSUES:  Normal      IMPRESSION:     Stable 1 cm left frontal vertex extra-axial calcified lesion which may represent a 'burnt out' meningioma versus exostosis  CT imaging may be helpful in further distinguishing between the differential considerations

## 2019-11-16 ENCOUNTER — TRANSCRIBE ORDERS (OUTPATIENT)
Dept: RADIOLOGY | Facility: HOSPITAL | Age: 49
End: 2019-11-16

## 2019-11-16 ENCOUNTER — HOSPITAL ENCOUNTER (OUTPATIENT)
Dept: RADIOLOGY | Facility: HOSPITAL | Age: 49
Discharge: HOME | End: 2019-11-16
Payer: COMMERCIAL

## 2019-11-16 DIAGNOSIS — G89.29 CHRONIC RIGHT SHOULDER PAIN: ICD-10-CM

## 2019-11-16 DIAGNOSIS — M25.511 CHRONIC RIGHT SHOULDER PAIN: ICD-10-CM

## 2019-11-16 PROCEDURE — 73030 X-RAY EXAM OF SHOULDER: CPT

## 2019-12-12 DIAGNOSIS — G89.29 CHRONIC RIGHT SHOULDER PAIN: Primary | ICD-10-CM

## 2019-12-12 DIAGNOSIS — M25.511 CHRONIC RIGHT SHOULDER PAIN: Primary | ICD-10-CM

## 2019-12-26 ENCOUNTER — HOSPITAL ENCOUNTER (OUTPATIENT)
Dept: RADIOLOGY | Age: 49
Discharge: HOME | End: 2019-12-26
Payer: COMMERCIAL

## 2019-12-26 VITALS — BODY MASS INDEX: 27.66 KG/M2 | HEIGHT: 65 IN | WEIGHT: 166 LBS

## 2019-12-26 DIAGNOSIS — Z12.31 ENCOUNTER FOR SCREENING MAMMOGRAM FOR MALIGNANT NEOPLASM OF BREAST: ICD-10-CM

## 2019-12-26 PROCEDURE — 77063 BREAST TOMOSYNTHESIS BI: CPT

## 2019-12-26 PROCEDURE — 77067 SCR MAMMO BI INCL CAD: CPT

## 2019-12-28 ENCOUNTER — HOSPITAL ENCOUNTER (OUTPATIENT)
Dept: MRI IMAGING | Facility: HOSPITAL | Age: 49
Discharge: HOME | End: 2019-12-28
Payer: COMMERCIAL

## 2019-12-28 DIAGNOSIS — M25.511 CHRONIC RIGHT SHOULDER PAIN: ICD-10-CM

## 2019-12-28 DIAGNOSIS — G89.29 CHRONIC RIGHT SHOULDER PAIN: ICD-10-CM

## 2019-12-28 PROCEDURE — 73221 MRI JOINT UPR EXTREM W/O DYE: CPT

## 2020-01-02 ENCOUNTER — ANNUAL EXAM (OUTPATIENT)
Dept: FAMILY MEDICINE CLINIC | Facility: CLINIC | Age: 50
End: 2020-01-02
Payer: COMMERCIAL

## 2020-01-02 VITALS
SYSTOLIC BLOOD PRESSURE: 120 MMHG | HEART RATE: 83 BPM | BODY MASS INDEX: 26.97 KG/M2 | DIASTOLIC BLOOD PRESSURE: 82 MMHG | HEIGHT: 66 IN | OXYGEN SATURATION: 98 % | WEIGHT: 167.8 LBS | TEMPERATURE: 98.2 F | RESPIRATION RATE: 18 BRPM

## 2020-01-02 DIAGNOSIS — Z12.11 SCREENING FOR COLON CANCER: ICD-10-CM

## 2020-01-02 DIAGNOSIS — Z01.419 ENCOUNTER FOR ROUTINE GYNECOLOGICAL EXAMINATION WITH PAPANICOLAOU SMEAR OF CERVIX: Primary | ICD-10-CM

## 2020-01-02 PROCEDURE — G0145 SCR C/V CYTO,THINLAYER,RESCR: HCPCS | Performed by: NURSE PRACTITIONER

## 2020-01-02 PROCEDURE — S0612 ANNUAL GYNECOLOGICAL EXAMINA: HCPCS | Performed by: NURSE PRACTITIONER

## 2020-01-02 PROCEDURE — 99396 PREV VISIT EST AGE 40-64: CPT | Performed by: NURSE PRACTITIONER

## 2020-01-02 RX ORDER — SYRINGE WITH NEEDLE, 1 ML 28GX1/2"
SYRINGE, EMPTY DISPOSABLE MISCELLANEOUS
Refills: 2 | COMMUNITY
Start: 2019-12-07

## 2020-01-02 NOTE — PROGRESS NOTES
Subjective     Shawna Werner is a 47 y o  female here for a routine exam   Current complaints: none  Personal health questionnaire reviewed: yes  Gynecologic History  No LMP recorded  Patient has had a hysterectomy  Contraception: none  Last Pap: 2018  Results were: normal  Last mammogram: 2019   Results were: normal    Obstetric History  OB History    Para Term  AB Living   2 1 1   1 1   SAB TAB Ectopic Multiple Live Births           1      # Outcome Date GA Lbr Stiven/2nd Weight Sex Delivery Anes PTL Lv   2 AB            1 Term                  The following portions of the patient's history were reviewed and updated as appropriate: allergies, current medications, past family history, past medical history, past social history, past surgical history and problem list     Review of Systems  Constitutional: negative  Respiratory: negative  Cardiovascular: negative  Gastrointestinal: negative  Genitourinary:negative  Hematologic/lymphatic: negative  Endocrine: negative      Objective     /82   Pulse 83   Temp 98 2 °F (36 8 °C) (Oral)   Resp 18   Ht 5' 6 06" (1 678 m)   Wt 76 1 kg (167 lb 12 8 oz)   SpO2 98%   BMI 27 03 kg/m²   General appearance: alert and oriented, in no acute distress  Neck: no adenopathy, no carotid bruit, no JVD, supple, symmetrical, trachea midline and thyroid not enlarged, symmetric, no tenderness/mass/nodules  Breasts: normal appearance, no masses or tenderness  Abdomen: soft, non-tender; bowel sounds normal; no masses,  no organomegaly  Pelvic: external genitalia normal, vagina normal without discharge, uterus normal size, shape, and consistency, no cervical motion tenderness, cervix normal in appearance, no adnexal masses or tenderness, rectovaginal septum normal and urethral meatus  Rectal: normal tone, no masses or tenderness  Skin: Skin color, texture, turgor normal  No rashes or lesions  Lymph nodes: Cervical, supraclavicular, and axillary nodes normal  Assessment     Healthy female exam       Plan     Education reviewed: depression evaluation, low fat, low cholesterol diet, self breast exams, skin cancer screening and weight bearing exercise  Mammogram ordered  Follow up in: 3 years  BMI Counseling: Body mass index is 27 03 kg/m²  The BMI is above normal  Nutrition recommendations include reducing portion sizes, decreasing overall calorie intake, 3-5 servings of fruits/vegetables daily, reducing fast food intake, consuming healthier snacks, decreasing soda and/or juice intake, moderation in carbohydrate intake, increasing intake of lean protein, reducing intake of saturated fat and trans fat and reducing intake of cholesterol  Exercise recommendations include moderate aerobic physical activity for 150 minutes/week, exercising 3-5 times per week and strength training exercises  Depression Screening Follow-up Plan: Patient's depression screening was positive with a PHQ-2 score of   Their PHQ-9 score was   Patient assessed for underlying major depression  They have no active suicidal ideations  Brief counseling provided and recommend additional follow-up/re-evaluation next office visit

## 2020-01-07 LAB
LAB AP GYN PRIMARY INTERPRETATION: NORMAL
LAB AP LMP: NORMAL
Lab: NORMAL

## 2020-02-04 ENCOUNTER — OFFICE VISIT (OUTPATIENT)
Dept: PHYSICAL THERAPY | Facility: OTHER | Age: 50
End: 2020-02-04
Payer: COMMERCIAL

## 2020-02-04 DIAGNOSIS — M54.16 LUMBAR RADICULOPATHY: Primary | ICD-10-CM

## 2020-02-04 PROCEDURE — 97161 PT EVAL LOW COMPLEX 20 MIN: CPT | Performed by: PHYSICAL THERAPIST

## 2020-02-04 PROCEDURE — 97140 MANUAL THERAPY 1/> REGIONS: CPT | Performed by: PHYSICAL THERAPIST

## 2020-02-04 PROCEDURE — 97110 THERAPEUTIC EXERCISES: CPT | Performed by: PHYSICAL THERAPIST

## 2020-02-04 NOTE — PROGRESS NOTES
PT Evaluation     Today's date: 2020  Patient name: Harry Jaimes  : 1970  MRN: [id number]  Referring provider: Ja Cosby, *  Dx: No diagnosis found  Assessment  Assessment details: Harry Jaimes is a pleasant 47 y o  female who presents with B/L SIJ pain for >1 week altering SIJ region  She reported that initally her calves were tight  She rpeorted that she has been doing some slef mobilizis tennis  No Bowel or bladder changes  Or N/T  No referred pain  located B/L SIJ she received chiro care in the past with relief but unable to get in  primary complaint is prolonged sitting at this time  She tolerated treatment well today and was issued several exercises  No referral seems needed at this time she will be in in 1 weeks time  HEP issued and POC reviewed  Da IE 30 days 3 2    Impairments: abnormal coordination, abnormal muscle firing, abnormal or restricted ROM, abnormal movement, activity intolerance, difficulty understanding, impaired physical strength, lacks appropriate home exercise program, pain with function and poor body mechanics    Symptom irritability: moderateUnderstanding of Dx/Px/POC: good   Prognosis: good    Goals  Short Term:  Pt will report decreased levels of pain by at least 2 subjective ratings in 4 weeks  Pt will demonstrate improved ROM by at least 10 degrees in 4 weeks  Pt will demonstrate improved strength by 1/2 grade  Long Term:   Pt will be independent in their HEP in 8 weeks  Pt will be be pain free with IADL's  Pt will demonstrate improved FOTO, > 80         Plan  Plan details: Prognosis above is given PT services 2x/week tapering to 1x/week over the next 3 months and home program adherence    Patient would benefit from: skilled physical therapy  Planned modality interventions: thermotherapy: hydrocollator packs  Planned therapy interventions: activity modification, joint mobilization, manual therapy, motor coordination training, neuromuscular re-education, patient education, self care, therapeutic activities, therapeutic exercise, graded activity and home exercise program  Frequency: 2x week  Treatment plan discussed with: patient        Subjective Evaluation    Pain  At best pain rating: 3  At worst pain ratin    Patient Goals  Patient goals for therapy: decreased pain, increased strength, independence with ADLs/IADLs, improved balance and increased motion          Objective     General Comments:      Lumbar Comments  Special test                Hip/SI joint:   scour=   -    marleni = -    capsular mobility= -                 SLS=          obers=           piriformis test=-                   Gaenslen's test=-       Distraction=              Active SLR= -           Active SLR with stabilization =    -        Leg length =      -         Sacral Thrust=-   mendoza finger=  B/L  S/L si joint compression= -        LUMBAR tests:  SLR = B/L mild symtpoms    slump= B/L mild PA mob= hypomobility mild L sided L5 pain     traction=   does not meet criteria               femoral nerve traction=  -  Repetitive testing: extension  = -     flexion    = -  Lower extremity reflexes: intact R mild hyper reflexive UE no UMN signs   Lower extremity myotomes intact B/L LE   Sensation: intact B/L LE     No history of fracture, surgery, recent illness, osteoporosis       Pain seems to be SIJ and facet L sided more than R     Good tolerance to [employer]      Most Recent Value   PT/OT G-Codes   Current Score  58   Projected Score  82             Precautions: none       Manual  2 4            supne lumbopelvic gr 5  MJ            S/L L sided gr 5  MJ            Prone IR/ER sij mobilizations B/L  MJ            Laser B/L SIJ  mj                              Exercise Diary              multifitus press              LTR              Cat camel              Prayer Modalities

## 2020-02-06 ENCOUNTER — OFFICE VISIT (OUTPATIENT)
Dept: PHYSICAL THERAPY | Facility: OTHER | Age: 50
End: 2020-02-06
Payer: COMMERCIAL

## 2020-02-06 DIAGNOSIS — M54.16 LUMBAR RADICULOPATHY: Primary | ICD-10-CM

## 2020-02-06 PROCEDURE — 97110 THERAPEUTIC EXERCISES: CPT | Performed by: PHYSICAL THERAPIST

## 2020-02-06 PROCEDURE — 97140 MANUAL THERAPY 1/> REGIONS: CPT | Performed by: PHYSICAL THERAPIST

## 2020-02-06 PROCEDURE — 97112 NEUROMUSCULAR REEDUCATION: CPT | Performed by: PHYSICAL THERAPIST

## 2020-02-06 NOTE — PROGRESS NOTES
Daily Note     Today's date: 2020  Patient name: Ole Trejo  : 1970  MRN: [id number]  Referring provider: Arlette Pineda, *  Dx:   Encounter Diagnosis     ICD-10-CM    1  Lumbar radiculopathy M54 16                   Subjective:       Objective: See treatment diary below      Assessment: Tolerated treatment well  Patient demonstrated fatigue post treatment      Plan: Continue per plan of care        Precautions: none       Manual  2 4 2 6           supne lumbopelvic gr 5  MJ            S/L L sided gr 5  MJ MJ           Prone IR/ER sij mobilizations B/L  MJ L MJ           Laser B/L SIJ  mj  Mj focus on L            ISTM cupping R paraspinals   MJ               Exercise Diary  2 6            multifitus press  Wall 5''x10             LTR              Cat camel  20             Prayer              L hipflexotr stretch  10''x1`0            Quad alt UE  15             trx squats  20                                                                                                                                                                                          Modalities

## 2020-02-12 ENCOUNTER — APPOINTMENT (OUTPATIENT)
Dept: PHYSICAL THERAPY | Facility: OTHER | Age: 50
End: 2020-02-12
Payer: COMMERCIAL

## 2020-03-13 ENCOUNTER — APPOINTMENT (OUTPATIENT)
Dept: LAB | Facility: CLINIC | Age: 50
End: 2020-03-13
Payer: COMMERCIAL

## 2020-03-13 ENCOUNTER — TRANSCRIBE ORDERS (OUTPATIENT)
Dept: LAB | Facility: CLINIC | Age: 50
End: 2020-03-13

## 2020-03-13 DIAGNOSIS — Z00.8 HEALTH EXAMINATION IN POPULATION SURVEY: ICD-10-CM

## 2020-03-13 DIAGNOSIS — Z00.8 HEALTH EXAMINATION IN POPULATION SURVEY: Primary | ICD-10-CM

## 2020-03-13 LAB
CHOLEST SERPL-MCNC: 259 MG/DL (ref 50–200)
EST. AVERAGE GLUCOSE BLD GHB EST-MCNC: 111 MG/DL
HBA1C MFR BLD: 5.5 %
HDLC SERPL-MCNC: 91 MG/DL
LDLC SERPL CALC-MCNC: 151 MG/DL (ref 0–100)
NONHDLC SERPL-MCNC: 168 MG/DL
TRIGL SERPL-MCNC: 84 MG/DL

## 2020-03-13 PROCEDURE — 83036 HEMOGLOBIN GLYCOSYLATED A1C: CPT

## 2020-03-13 PROCEDURE — 36415 COLL VENOUS BLD VENIPUNCTURE: CPT

## 2020-03-13 PROCEDURE — 80061 LIPID PANEL: CPT

## 2020-04-29 DIAGNOSIS — B35.1 NAIL FUNGUS: Primary | ICD-10-CM

## 2020-04-29 RX ORDER — TERBINAFINE HYDROCHLORIDE 250 MG/1
250 TABLET ORAL DAILY
Qty: 30 TABLET | Refills: 3 | Status: SHIPPED | OUTPATIENT
Start: 2020-04-29 | End: 2020-07-28

## 2020-07-14 DIAGNOSIS — R10.84 GENERALIZED ABDOMINAL PAIN: ICD-10-CM

## 2020-07-14 RX ORDER — LUBIPROSTONE 24 UG/1
24 CAPSULE, GELATIN COATED ORAL 2 TIMES DAILY WITH MEALS
Qty: 60 CAPSULE | Refills: 3 | Status: SHIPPED | OUTPATIENT
Start: 2020-07-14 | End: 2020-07-15 | Stop reason: SDUPTHER

## 2020-07-31 ENCOUNTER — ANESTHESIA EVENT (OUTPATIENT)
Dept: GASTROENTEROLOGY | Facility: AMBULARY SURGERY CENTER | Age: 50
End: 2020-07-31

## 2020-08-14 ENCOUNTER — ANESTHESIA (OUTPATIENT)
Dept: GASTROENTEROLOGY | Facility: AMBULARY SURGERY CENTER | Age: 50
End: 2020-08-14

## 2020-08-14 ENCOUNTER — HOSPITAL ENCOUNTER (OUTPATIENT)
Dept: GASTROENTEROLOGY | Facility: AMBULARY SURGERY CENTER | Age: 50
Setting detail: OUTPATIENT SURGERY
Discharge: HOME | End: 2020-08-14
Attending: COLON & RECTAL SURGERY | Admitting: COLON & RECTAL SURGERY
Payer: COMMERCIAL

## 2020-08-14 VITALS
WEIGHT: 161 LBS | BODY MASS INDEX: 25.88 KG/M2 | HEART RATE: 77 BPM | DIASTOLIC BLOOD PRESSURE: 79 MMHG | RESPIRATION RATE: 27 BRPM | OXYGEN SATURATION: 99 % | TEMPERATURE: 96.2 F | HEIGHT: 66 IN | SYSTOLIC BLOOD PRESSURE: 134 MMHG

## 2020-08-14 DIAGNOSIS — Z80.0 FAMILY HISTORY OF COLON CANCER: ICD-10-CM

## 2020-08-14 DIAGNOSIS — K59.01 CONSTIPATION BY DELAYED COLONIC TRANSIT: ICD-10-CM

## 2020-08-14 PROCEDURE — 88305 TISSUE EXAM BY PATHOLOGIST: CPT | Performed by: PATHOLOGY

## 2020-08-14 PROCEDURE — 45385 COLONOSCOPY W/LESION REMOVAL: CPT | Performed by: COLON & RECTAL SURGERY

## 2020-08-14 PROCEDURE — 99213 OFFICE O/P EST LOW 20 MIN: CPT | Performed by: COLON & RECTAL SURGERY

## 2020-08-14 RX ORDER — PROPOFOL 10 MG/ML
INJECTION, EMULSION INTRAVENOUS AS NEEDED
Status: DISCONTINUED | OUTPATIENT
Start: 2020-08-14 | End: 2020-08-14

## 2020-08-14 RX ORDER — PROPOFOL 10 MG/ML
INJECTION, EMULSION INTRAVENOUS CONTINUOUS PRN
Status: DISCONTINUED | OUTPATIENT
Start: 2020-08-14 | End: 2020-08-14

## 2020-08-14 RX ORDER — LIDOCAINE HYDROCHLORIDE 10 MG/ML
0.5 INJECTION, SOLUTION EPIDURAL; INFILTRATION; INTRACAUDAL; PERINEURAL ONCE AS NEEDED
Status: DISCONTINUED | OUTPATIENT
Start: 2020-08-14 | End: 2020-08-18 | Stop reason: HOSPADM

## 2020-08-14 RX ORDER — SODIUM CHLORIDE, SODIUM LACTATE, POTASSIUM CHLORIDE, CALCIUM CHLORIDE 600; 310; 30; 20 MG/100ML; MG/100ML; MG/100ML; MG/100ML
125 INJECTION, SOLUTION INTRAVENOUS CONTINUOUS
Status: DISCONTINUED | OUTPATIENT
Start: 2020-08-14 | End: 2020-08-18 | Stop reason: HOSPADM

## 2020-08-14 RX ORDER — LIDOCAINE HYDROCHLORIDE 10 MG/ML
INJECTION, SOLUTION EPIDURAL; INFILTRATION; INTRACAUDAL; PERINEURAL AS NEEDED
Status: DISCONTINUED | OUTPATIENT
Start: 2020-08-14 | End: 2020-08-14

## 2020-08-14 RX ADMIN — PROPOFOL 50 MG: 10 INJECTION, EMULSION INTRAVENOUS at 11:00

## 2020-08-14 RX ADMIN — SODIUM CHLORIDE, SODIUM LACTATE, POTASSIUM CHLORIDE, AND CALCIUM CHLORIDE: .6; .31; .03; .02 INJECTION, SOLUTION INTRAVENOUS at 09:30

## 2020-08-14 RX ADMIN — LIDOCAINE HYDROCHLORIDE 50 MG: 10 INJECTION, SOLUTION EPIDURAL; INFILTRATION; INTRACAUDAL; PERINEURAL at 10:59

## 2020-08-14 RX ADMIN — PROPOFOL 30 MG: 10 INJECTION, EMULSION INTRAVENOUS at 11:07

## 2020-08-14 RX ADMIN — PROPOFOL 100 MCG/KG/MIN: 10 INJECTION, EMULSION INTRAVENOUS at 11:00

## 2020-08-14 RX ADMIN — PROPOFOL 100 MG: 10 INJECTION, EMULSION INTRAVENOUS at 10:59

## 2020-08-14 NOTE — H&P
History and Physical   Colon and Rectal Surgery   [facility] 47 y o  female MRN: [id number]  Unit/Bed#:  Encounter: [id number]  08/14/20   @[facility]    No chief complaint on file  History of Present Illness   HPI:  [facility] is a 47 y o  female who presents for screening colonoscopy  Patient has a family history of colorectal cancer  Last examination was in 2015         Historical Information   Past Medical History:   Diagnosis Date    Asthma     Last assessed 9/25/2014    Eczema      Past Surgical History:   Procedure Laterality Date    HYSTERECTOMY  2015    TONSILLECTOMY AND ADENOIDECTOMY         Meds/Allergies     (Not in a hospital admission)        Current Outpatient Medications:     ALPRAZolam (XANAX) 0 25 mg tablet, Take 1 tablet (0 25 mg total) by mouth 3 (three) times a day as needed for anxiety (Patient taking differently: Take 0 25 mg by mouth as needed for anxiety ), Disp: 30 tablet, Rfl: 0    AMITIZA 24 MCG capsule, Take 1 capsule (24 mcg total) by mouth 2 (two) times a day with meals, Disp: 60 capsule, Rfl: 3    b complex vitamins capsule, Take 1 capsule by mouth daily, Disp: , Rfl:     B-D ALLERGY SYRINGE 1CC/28G 28G X 1/2" 1 ML MISC, , Disp: , Rfl: 2    Cholecalciferol (VITAMIN D3) 2000 units capsule, Take 2 capsules by mouth daily  , Disp: , Rfl:     citalopram (CeleXA) 40 mg tablet, Take 1 tablet (40 mg total) by mouth daily, Disp: 90 tablet, Rfl: 3    DUPIXENT 300 MG/2ML SOSY, , Disp: , Rfl:     fluticasone (FLONASE) 50 mcg/act nasal spray, 2 sprays into each nostril daily, Disp: , Rfl:     topiramate (TOPAMAX) 100 mg tablet, Take 1 tablet (100 mg total) by mouth daily, Disp: 90 tablet, Rfl: 3    valACYclovir (VALTREX) 500 mg tablet, Take 500 mg by mouth daily  , Disp: , Rfl:     Allergies   Allergen Reactions    Other      SEASONAL          Social History   Social History     Substance and Sexual Activity   Alcohol Use Yes    Comment: Social     Social History Substance and Sexual Activity   Drug Use Never     Social History     Tobacco Use   Smoking Status Former Smoker   Smokeless Tobacco Never Used         Family History:   Family History   Problem Relation Age of Onset    Hypertension Mother     Colon cancer Father 50    No Known Problems Sister     No Known Problems Daughter     No Known Problems Maternal Grandmother     No Known Problems Maternal Grandfather     No Known Problems Paternal Grandmother     No Known Problems Paternal Grandfather     No Known Problems Maternal Aunt          Objective     Current Vitals:      No intake or output data in the 24 hours ending 08/14/20 0936    Physical Exam:  General:  Resting comfortably in bed   Eyes:Sclera anicteric  ENT: Trachea midline  Pulm:  Symmetric chest raise  No respiratory Distress  CV:  Regular on monitor  Abdomen:  Soft NT ND  Extremities:  No clubbing/ cyanosis/ edema    Lab Results: I have personally reviewed pertinent lab results  Imaging: I have personally reviewed pertinent reports  ASSESSMENT:  [facility] is a 47 y o  female who presents for outpatient colonoscopy  PLAN:  For colonoscopy    Risks/ Benefits reviewed to include but not limited to anesthesia, bleeding, missed lesions, and colonoscopic perforation requiring surgery

## 2020-08-14 NOTE — ANESTHESIA PREPROCEDURE EVALUATION
Procedure:  COLONOSCOPY    Relevant Problems   ANESTHESIA (within normal limits)      /RENAL   (+) Nephrolithiasis      NEURO/PSYCH   (+) Anxiety   (+) Depression      Other   (+) Family history of colon cancer        Physical Exam    Airway    Mallampati score: II  TM Distance: >3 FB  Neck ROM: full     Dental   No notable dental hx     Cardiovascular  Rhythm: regular, Rate: normal,     Pulmonary  Pulmonary exam normal Breath sounds clear to auscultation,     Other Findings        Anesthesia Plan  ASA Score- 2     Anesthesia Type- IV sedation with anesthesia with ASA Monitors  Additional Monitors:   Airway Plan:           Plan Factors-Exercise tolerance (METS): >4 METS  Chart reviewed  Existing labs reviewed  Patient summary reviewed  Patient is not a current smoker  Induction-     Postoperative Plan-     Informed Consent- Anesthetic plan and risks discussed with patient  I personally reviewed this patient with the CRNA  Discussed and agreed on the Anesthesia Plan with the CRNA  Karrie Nissen

## 2020-08-14 NOTE — ANESTHESIA POSTPROCEDURE EVALUATION
Post-Op Assessment Note    CV Status:  Stable  Pain Score: 0    Pain management: adequate     Mental Status:  Awake   Hydration Status:  Euvolemic   PONV Controlled:  Controlled   Airway Patency:  Patent      Post Op Vitals Reviewed: Yes      Staff: CRNA         No complications documented      BP   129/85   Temp      Pulse  91   Resp   21   SpO2   100

## 2020-09-18 ENCOUNTER — OFFICE VISIT (OUTPATIENT)
Dept: FAMILY MEDICINE CLINIC | Facility: CLINIC | Age: 50
End: 2020-09-18
Payer: COMMERCIAL

## 2020-09-18 VITALS
HEART RATE: 78 BPM | HEIGHT: 66 IN | RESPIRATION RATE: 18 BRPM | WEIGHT: 168.2 LBS | BODY MASS INDEX: 27.03 KG/M2 | TEMPERATURE: 98.2 F | DIASTOLIC BLOOD PRESSURE: 78 MMHG | OXYGEN SATURATION: 97 % | SYSTOLIC BLOOD PRESSURE: 120 MMHG

## 2020-09-18 DIAGNOSIS — M54.42 ACUTE BILATERAL LOW BACK PAIN WITH BILATERAL SCIATICA: Primary | ICD-10-CM

## 2020-09-18 DIAGNOSIS — M54.41 ACUTE BILATERAL LOW BACK PAIN WITH BILATERAL SCIATICA: Primary | ICD-10-CM

## 2020-09-18 PROCEDURE — 99213 OFFICE O/P EST LOW 20 MIN: CPT | Performed by: NURSE PRACTITIONER

## 2020-09-18 RX ORDER — CYCLOBENZAPRINE HCL 10 MG
10 TABLET ORAL 3 TIMES DAILY PRN
Qty: 45 TABLET | Refills: 1 | Status: SHIPPED | OUTPATIENT
Start: 2020-09-18 | End: 2022-03-18

## 2020-09-18 NOTE — PROGRESS NOTES
Assessment/Plan:           Problem List Items Addressed This Visit        Other    Acute bilateral low back pain with bilateral sciatica - Primary    Relevant Medications    cyclobenzaprine (FLEXERIL) 10 mg tablet            Subjective:      Patient ID: Ever Morales is a 47 y o  female  Having low back pain and sciatica down both legs, alternates down right or left  Sitting for 11 hours due to telehealth  Can get tight cramps in her calves  Did go to physical therapy for treatment          The following portions of the patient's history were reviewed and updated as appropriate: allergies, current medications, past family history, past medical history, past social history, past surgical history and problem list     Review of Systems   Constitutional: Negative for fatigue and fever  HENT: Negative for congestion and postnasal drip  Respiratory: Negative for cough and shortness of breath  Cardiovascular: Negative for chest pain and palpitations  Gastrointestinal: Negative for constipation and diarrhea  Genitourinary: Negative for dysuria and frequency  Musculoskeletal: Positive for back pain  Skin: Negative for rash  Neurological: Negative for dizziness and headaches  Hematological: Negative for adenopathy  Psychiatric/Behavioral: Negative for dysphoric mood  The patient is not nervous/anxious            Objective:      /78 (BP Location: Left arm, Patient Position: Sitting, Cuff Size: Standard)   Pulse 78   Temp 98 2 °F (36 8 °C) (Tympanic)   Resp 18   Ht 5' 6" (1 676 m)   Wt 76 3 kg (168 lb 3 2 oz)   SpO2 97%   BMI 27 15 kg/m²     Family History   Problem Relation Age of Onset    Hypertension Mother     Colon cancer Father 50    No Known Problems Sister     No Known Problems Daughter     No Known Problems Maternal Grandmother     No Known Problems Maternal Grandfather     No Known Problems Paternal Grandmother     No Known Problems Paternal Grandfather     No Known Problems Maternal Aunt      Past Medical History:   Diagnosis Date    Anxiety     Asthma     Last assessed 2014    Colon polyp     Constipation     Eczema     Irritable bowel syndrome (IBS)     Seasonal allergies     [place] teeth extracted      Social History     Socioeconomic History    Marital status: /Civil Union     Spouse name: Not on file    Number of children: Not on file    Years of education: Not on file    Highest education level: Not on file   Occupational History    Not on file   Social Needs    Financial resource strain: Not on file    Food insecurity     Worry: Not on file     Inability: Not on file   [employer] needs     Medical: Not on file     Non-medical: Not on file   Tobacco Use    Smoking status: Former Smoker     Packs/day: 0 25     Years: 10 00     Pack years: 2 50     Types: Cigarettes     Last attempt to quit: 1993     Years since quittin 1    Smokeless tobacco: Never Used   Substance and Sexual Activity    Alcohol use:  Yes     Alcohol/week: 3 0 standard drinks     Types: 3 Glasses of wine per week     Frequency: 2-3 times a week     Drinks per session: 1 or 2    Drug use: Never    Sexual activity: Yes     Partners: Male   Lifestyle    Physical activity     Days per week: Not on file     Minutes per session: Not on file    Stress: Not on file   Relationships    Social connections     Talks on phone: Not on file     Gets together: Not on file     Attends [employer] service: Not on file     Active member of club or organization: Not on file     Attends meetings of clubs or organizations: Not on file     Relationship status: Not on file    Intimate partner violence     Fear of current or ex partner: Not on file     Emotionally abused: Not on file     Physically abused: Not on file     Forced sexual activity: Not on file   Other Topics Concern    Not on file   Social History Narrative    Not on file       Current Outpatient Medications:    ALPRAZolam (XANAX) 0 25 mg tablet, Take 1 tablet (0 25 mg total) by mouth 3 (three) times a day as needed for anxiety (Patient taking differently: Take 0 25 mg by mouth as needed for anxiety ), Disp: 30 tablet, Rfl: 0    b complex vitamins capsule, Take 1 capsule by mouth daily, Disp: , Rfl:     B-D ALLERGY SYRINGE 1CC/28G 28G X 1/2" 1 ML MISC, , Disp: , Rfl: 2    Cholecalciferol (VITAMIN D3) 2000 units capsule, Take 2 capsules by mouth daily  , Disp: , Rfl:     citalopram (CeleXA) 40 mg tablet, Take 1 tablet (40 mg total) by mouth daily, Disp: 90 tablet, Rfl: 3    DUPIXENT 300 MG/2ML SOSY, , Disp: , Rfl:     fluticasone (FLONASE) 50 mcg/act nasal spray, 2 sprays into each nostril daily, Disp: , Rfl:     topiramate (TOPAMAX) 100 mg tablet, Take 1 tablet (100 mg total) by mouth daily, Disp: 90 tablet, Rfl: 3    valACYclovir (VALTREX) 500 mg tablet, Take 500 mg by mouth daily  , Disp: , Rfl:     AMITIZA 24 MCG capsule, Take 1 capsule (24 mcg total) by mouth 2 (two) times a day with meals, Disp: 60 capsule, Rfl: 3    cyclobenzaprine (FLEXERIL) 10 mg tablet, Take 1 tablet (10 mg total) by mouth 3 (three) times a day as needed for muscle spasms, Disp: 45 tablet, Rfl: 1  No Known Allergies     Physical Exam  Vitals signs and nursing note reviewed  Constitutional:       Appearance: Normal appearance  HENT:      Head: Normocephalic and atraumatic  Eyes:      Conjunctiva/sclera: Conjunctivae normal    Cardiovascular:      Rate and Rhythm: Normal rate and regular rhythm  Pulses: Normal pulses  Heart sounds: Normal heart sounds  Pulmonary:      Effort: Pulmonary effort is normal       Breath sounds: Normal breath sounds  Abdominal:      General: Bowel sounds are normal    Musculoskeletal: Normal range of motion  Skin:     General: Skin is warm and dry  Capillary Refill: Capillary refill takes less than 2 seconds     Neurological:      Mental Status: She is alert and oriented to person, place, and time  Psychiatric:         Mood and Affect: Mood normal          Behavior: Behavior normal        BMI Counseling: Body mass index is 27 15 kg/m²  The BMI is above normal  Nutrition recommendations include reducing portion sizes, decreasing overall calorie intake, 3-5 servings of fruits/vegetables daily, reducing fast food intake, consuming healthier snacks, decreasing soda and/or juice intake, moderation in carbohydrate intake, increasing intake of lean protein, reducing intake of saturated fat and trans fat and reducing intake of cholesterol  Exercise recommendations include moderate aerobic physical activity for 150 minutes/week, exercising 3-5 times per week and strength training exercises

## 2020-09-22 PROBLEM — M54.41 ACUTE BILATERAL LOW BACK PAIN WITH BILATERAL SCIATICA: Status: ACTIVE | Noted: 2020-09-22

## 2020-09-22 PROBLEM — M54.42 ACUTE BILATERAL LOW BACK PAIN WITH BILATERAL SCIATICA: Status: ACTIVE | Noted: 2020-09-22

## 2020-10-09 ENCOUNTER — APPOINTMENT (OUTPATIENT)
Dept: LAB | Age: 50
End: 2020-10-09
Payer: COMMERCIAL

## 2020-10-09 ENCOUNTER — TELEPHONE (OUTPATIENT)
Dept: UROLOGY | Facility: MEDICAL CENTER | Age: 50
End: 2020-10-09

## 2020-10-09 DIAGNOSIS — N20.0 NEPHROLITHIASIS: Primary | ICD-10-CM

## 2020-10-09 DIAGNOSIS — N20.0 NEPHROLITHIASIS: ICD-10-CM

## 2020-10-09 LAB
BACTERIA UR QL AUTO: ABNORMAL /HPF
BILIRUB UR QL STRIP: NEGATIVE
CLARITY UR: CLEAR
COLOR UR: YELLOW
GLUCOSE UR STRIP-MCNC: NEGATIVE MG/DL
HGB UR QL STRIP.AUTO: ABNORMAL
HYALINE CASTS #/AREA URNS LPF: ABNORMAL /LPF
KETONES UR STRIP-MCNC: NEGATIVE MG/DL
LEUKOCYTE ESTERASE UR QL STRIP: NEGATIVE
NITRITE UR QL STRIP: NEGATIVE
NON-SQ EPI CELLS URNS QL MICRO: ABNORMAL /HPF
PH UR STRIP.AUTO: 6.5 [PH]
PROT UR STRIP-MCNC: NEGATIVE MG/DL
RBC #/AREA URNS AUTO: ABNORMAL /HPF
SP GR UR STRIP.AUTO: 1.01 (ref 1–1.03)
UROBILINOGEN UR QL STRIP.AUTO: 0.2 E.U./DL
WBC #/AREA URNS AUTO: ABNORMAL /HPF

## 2020-10-09 PROCEDURE — 87086 URINE CULTURE/COLONY COUNT: CPT

## 2020-10-09 PROCEDURE — 81001 URINALYSIS AUTO W/SCOPE: CPT

## 2020-10-10 LAB — BACTERIA UR CULT: NORMAL

## 2020-10-12 ENCOUNTER — HOSPITAL ENCOUNTER (OUTPATIENT)
Dept: RADIOLOGY | Age: 50
Discharge: HOME | End: 2020-10-12
Payer: COMMERCIAL

## 2020-10-12 DIAGNOSIS — N20.0 NEPHROLITHIASIS: ICD-10-CM

## 2020-10-12 PROCEDURE — 74176 CT ABD & PELVIS W/O CONTRAST: CPT

## 2020-10-12 PROCEDURE — G1004 CDSM NDSC: HCPCS

## 2020-10-13 ENCOUNTER — TELEPHONE (OUTPATIENT)
Dept: UROLOGY | Facility: CLINIC | Age: 50
End: 2020-10-13

## 2020-11-23 ENCOUNTER — OFFICE VISIT (OUTPATIENT)
Dept: UROLOGY | Facility: AMBULATORY SURGERY CENTER | Age: 50
End: 2020-11-23
Payer: COMMERCIAL

## 2020-11-23 VITALS
SYSTOLIC BLOOD PRESSURE: 118 MMHG | WEIGHT: 168.4 LBS | BODY MASS INDEX: 27.06 KG/M2 | HEIGHT: 66 IN | DIASTOLIC BLOOD PRESSURE: 66 MMHG

## 2020-11-23 DIAGNOSIS — N20.0 NEPHROLITHIASIS: Primary | ICD-10-CM

## 2020-11-23 LAB
SL AMB  POCT GLUCOSE, UA: NORMAL
SL AMB LEUKOCYTE ESTERASE,UA: NORMAL
SL AMB POCT BILIRUBIN,UA: NORMAL
SL AMB POCT BLOOD,UA: NORMAL
SL AMB POCT CLARITY,UA: CLEAR
SL AMB POCT COLOR,UA: YELLOW
SL AMB POCT KETONES,UA: NORMAL
SL AMB POCT NITRITE,UA: NORMAL
SL AMB POCT PH,UA: 6
SL AMB POCT SPECIFIC GRAVITY,UA: 1.01
SL AMB POCT URINE PROTEIN: NORMAL
SL AMB POCT UROBILINOGEN: NORMAL

## 2020-11-23 PROCEDURE — 99214 OFFICE O/P EST MOD 30 MIN: CPT | Performed by: NURSE PRACTITIONER

## 2020-11-23 PROCEDURE — 81002 URINALYSIS NONAUTO W/O SCOPE: CPT | Performed by: NURSE PRACTITIONER

## 2020-12-08 DIAGNOSIS — G43.809 OTHER MIGRAINE WITHOUT STATUS MIGRAINOSUS, NOT INTRACTABLE: ICD-10-CM

## 2020-12-08 RX ORDER — TOPIRAMATE 100 MG/1
TABLET, FILM COATED ORAL
Qty: 90 TABLET | Refills: 0 | Status: SHIPPED | OUTPATIENT
Start: 2020-12-08 | End: 2020-12-28 | Stop reason: SDUPTHER

## 2020-12-20 DIAGNOSIS — F32.A DEPRESSION, UNSPECIFIED DEPRESSION TYPE: ICD-10-CM

## 2020-12-21 RX ORDER — CITALOPRAM 40 MG/1
TABLET ORAL
Qty: 90 TABLET | Refills: 0 | Status: SHIPPED | OUTPATIENT
Start: 2020-12-21 | End: 2020-12-28 | Stop reason: SDUPTHER

## 2020-12-23 ENCOUNTER — IMMUNIZATIONS (OUTPATIENT)
Dept: FAMILY MEDICINE CLINIC | Facility: HOSPITAL | Age: 50
End: 2020-12-23
Payer: COMMERCIAL

## 2020-12-23 DIAGNOSIS — Z23 ENCOUNTER FOR IMMUNIZATION: ICD-10-CM

## 2020-12-23 PROCEDURE — 91300 SARS-COV-2 / COVID-19 MRNA VACCINE (PFIZER-BIONTECH) 30 MCG: CPT

## 2020-12-23 PROCEDURE — 0001A SARS-COV-2 / COVID-19 MRNA VACCINE (PFIZER-BIONTECH) 30 MCG: CPT

## 2020-12-28 ENCOUNTER — OFFICE VISIT (OUTPATIENT)
Dept: FAMILY MEDICINE CLINIC | Facility: CLINIC | Age: 50
End: 2020-12-28
Payer: COMMERCIAL

## 2020-12-28 ENCOUNTER — HOSPITAL ENCOUNTER (OUTPATIENT)
Dept: RADIOLOGY | Age: 50
Discharge: HOME | End: 2020-12-28
Payer: COMMERCIAL

## 2020-12-28 VITALS
SYSTOLIC BLOOD PRESSURE: 114 MMHG | BODY MASS INDEX: 27.42 KG/M2 | OXYGEN SATURATION: 99 % | HEIGHT: 66 IN | TEMPERATURE: 97.1 F | RESPIRATION RATE: 18 BRPM | WEIGHT: 170.6 LBS | HEART RATE: 104 BPM | DIASTOLIC BLOOD PRESSURE: 82 MMHG

## 2020-12-28 VITALS — BODY MASS INDEX: 27.42 KG/M2 | WEIGHT: 170.6 LBS | HEIGHT: 66 IN

## 2020-12-28 DIAGNOSIS — Z23 NEED FOR SHINGLES VACCINE: ICD-10-CM

## 2020-12-28 DIAGNOSIS — Z12.31 ENCOUNTER FOR SCREENING MAMMOGRAM FOR MALIGNANT NEOPLASM OF BREAST: ICD-10-CM

## 2020-12-28 DIAGNOSIS — Z00.00 ANNUAL PHYSICAL EXAM: Primary | ICD-10-CM

## 2020-12-28 DIAGNOSIS — R29.2 HYPERREFLEXIA: ICD-10-CM

## 2020-12-28 DIAGNOSIS — N20.0 NEPHROLITHIASIS: ICD-10-CM

## 2020-12-28 DIAGNOSIS — F32.A DEPRESSION, UNSPECIFIED DEPRESSION TYPE: ICD-10-CM

## 2020-12-28 DIAGNOSIS — G43.809 OTHER MIGRAINE WITHOUT STATUS MIGRAINOSUS, NOT INTRACTABLE: ICD-10-CM

## 2020-12-28 PROCEDURE — 77063 BREAST TOMOSYNTHESIS BI: CPT

## 2020-12-28 PROCEDURE — 99396 PREV VISIT EST AGE 40-64: CPT | Performed by: NURSE PRACTITIONER

## 2020-12-28 PROCEDURE — 77067 SCR MAMMO BI INCL CAD: CPT

## 2020-12-28 RX ORDER — ZOSTER VACCINE RECOMBINANT, ADJUVANTED 50 MCG/0.5
0.5 KIT INTRAMUSCULAR ONCE
Qty: 1 EACH | Refills: 1 | Status: SHIPPED | OUTPATIENT
Start: 2020-12-28 | End: 2020-12-28

## 2020-12-28 RX ORDER — TOPIRAMATE 100 MG/1
100 TABLET, FILM COATED ORAL DAILY
Qty: 90 TABLET | Refills: 3 | Status: SHIPPED | OUTPATIENT
Start: 2020-12-28 | End: 2022-02-28 | Stop reason: SDUPTHER

## 2020-12-28 RX ORDER — CITALOPRAM 40 MG/1
40 TABLET ORAL DAILY
Qty: 90 TABLET | Refills: 3 | Status: SHIPPED | OUTPATIENT
Start: 2020-12-28 | End: 2022-03-08 | Stop reason: SDUPTHER

## 2021-01-12 ENCOUNTER — IMMUNIZATIONS (OUTPATIENT)
Dept: FAMILY MEDICINE CLINIC | Facility: HOSPITAL | Age: 51
End: 2021-01-12

## 2021-01-12 DIAGNOSIS — Z23 ENCOUNTER FOR IMMUNIZATION: ICD-10-CM

## 2021-01-12 PROCEDURE — 0002A SARS-COV-2 / COVID-19 MRNA VACCINE (PFIZER-BIONTECH) 30 MCG: CPT

## 2021-01-12 PROCEDURE — 91300 SARS-COV-2 / COVID-19 MRNA VACCINE (PFIZER-BIONTECH) 30 MCG: CPT

## 2021-03-17 DIAGNOSIS — R10.84 GENERALIZED ABDOMINAL PAIN: ICD-10-CM

## 2021-03-17 RX ORDER — LUBIPROSTONE 24 UG/1
CAPSULE, GELATIN COATED ORAL
Qty: 60 CAPSULE | Refills: 3 | Status: SHIPPED | OUTPATIENT
Start: 2021-03-17 | End: 2021-03-18 | Stop reason: SDUPTHER

## 2021-04-21 ENCOUNTER — TRANSCRIBE ORDERS (OUTPATIENT)
Dept: LAB | Facility: CLINIC | Age: 51
End: 2021-04-21

## 2021-04-21 ENCOUNTER — APPOINTMENT (OUTPATIENT)
Dept: LAB | Facility: CLINIC | Age: 51
End: 2021-04-21
Payer: COMMERCIAL

## 2021-04-21 DIAGNOSIS — Z23 NEED FOR SHINGLES VACCINE: ICD-10-CM

## 2021-04-21 DIAGNOSIS — Z00.00 ANNUAL PHYSICAL EXAM: ICD-10-CM

## 2021-04-21 DIAGNOSIS — N20.0 NEPHROLITHIASIS: ICD-10-CM

## 2021-04-21 DIAGNOSIS — Z00.8 HEALTH EXAMINATION IN POPULATION SURVEY: ICD-10-CM

## 2021-04-21 DIAGNOSIS — R29.2 HYPERREFLEXIA: ICD-10-CM

## 2021-04-21 DIAGNOSIS — Z00.8 HEALTH EXAMINATION IN POPULATION SURVEY: Primary | ICD-10-CM

## 2021-04-21 LAB
ALBUMIN SERPL BCP-MCNC: 3.9 G/DL (ref 3.5–5)
ALP SERPL-CCNC: 45 U/L (ref 46–116)
ALT SERPL W P-5'-P-CCNC: 14 U/L (ref 12–78)
ANION GAP SERPL CALCULATED.3IONS-SCNC: 4 MMOL/L (ref 4–13)
AST SERPL W P-5'-P-CCNC: 12 U/L (ref 5–45)
BASOPHILS # BLD AUTO: 0.06 THOUSANDS/ΜL (ref 0–0.1)
BASOPHILS NFR BLD AUTO: 1 % (ref 0–1)
BILIRUB SERPL-MCNC: 0.75 MG/DL (ref 0.2–1)
BUN SERPL-MCNC: 17 MG/DL (ref 5–25)
CALCIUM SERPL-MCNC: 9.1 MG/DL (ref 8.3–10.1)
CHLORIDE SERPL-SCNC: 112 MMOL/L (ref 100–108)
CHOLEST SERPL-MCNC: 227 MG/DL (ref 50–200)
CHOLEST SERPL-MCNC: 227 MG/DL (ref 50–200)
CO2 SERPL-SCNC: 24 MMOL/L (ref 21–32)
CREAT SERPL-MCNC: 0.98 MG/DL (ref 0.6–1.3)
EOSINOPHIL # BLD AUTO: 0.2 THOUSAND/ΜL (ref 0–0.61)
EOSINOPHIL NFR BLD AUTO: 3 % (ref 0–6)
ERYTHROCYTE [DISTWIDTH] IN BLOOD BY AUTOMATED COUNT: 12.3 % (ref 11.6–15.1)
EST. AVERAGE GLUCOSE BLD GHB EST-MCNC: 108 MG/DL
GFR SERPL CREATININE-BSD FRML MDRD: 67 ML/MIN/1.73SQ M
GLUCOSE P FAST SERPL-MCNC: 95 MG/DL (ref 65–99)
HBA1C MFR BLD: 5.4 %
HCT VFR BLD AUTO: 43.7 % (ref 34.8–46.1)
HDLC SERPL-MCNC: 86 MG/DL
HDLC SERPL-MCNC: 89 MG/DL
HGB BLD-MCNC: 13.9 G/DL (ref 11.5–15.4)
IMM GRANULOCYTES # BLD AUTO: 0.02 THOUSAND/UL (ref 0–0.2)
IMM GRANULOCYTES NFR BLD AUTO: 0 % (ref 0–2)
LDLC SERPL CALC-MCNC: 128 MG/DL (ref 0–100)
LDLC SERPL CALC-MCNC: 131 MG/DL (ref 0–100)
LYMPHOCYTES # BLD AUTO: 1.5 THOUSANDS/ΜL (ref 0.6–4.47)
LYMPHOCYTES NFR BLD AUTO: 25 % (ref 14–44)
MCH RBC QN AUTO: 32.3 PG (ref 26.8–34.3)
MCHC RBC AUTO-ENTMCNC: 31.8 G/DL (ref 31.4–37.4)
MCV RBC AUTO: 101 FL (ref 82–98)
MONOCYTES # BLD AUTO: 0.47 THOUSAND/ΜL (ref 0.17–1.22)
MONOCYTES NFR BLD AUTO: 8 % (ref 4–12)
NEUTROPHILS # BLD AUTO: 3.82 THOUSANDS/ΜL (ref 1.85–7.62)
NEUTS SEG NFR BLD AUTO: 63 % (ref 43–75)
NONHDLC SERPL-MCNC: 138 MG/DL
NRBC BLD AUTO-RTO: 0 /100 WBCS
PLATELET # BLD AUTO: 259 THOUSANDS/UL (ref 149–390)
PMV BLD AUTO: 10 FL (ref 8.9–12.7)
POTASSIUM SERPL-SCNC: 4.3 MMOL/L (ref 3.5–5.3)
PROT SERPL-MCNC: 7.2 G/DL (ref 6.4–8.2)
RBC # BLD AUTO: 4.31 MILLION/UL (ref 3.81–5.12)
SODIUM SERPL-SCNC: 140 MMOL/L (ref 136–145)
TRIGL SERPL-MCNC: 48 MG/DL
TRIGL SERPL-MCNC: 51 MG/DL
TSH SERPL DL<=0.05 MIU/L-ACNC: 2.03 UIU/ML (ref 0.36–3.74)
WBC # BLD AUTO: 6.07 THOUSAND/UL (ref 4.31–10.16)

## 2021-04-21 PROCEDURE — 83036 HEMOGLOBIN GLYCOSYLATED A1C: CPT

## 2021-04-21 PROCEDURE — 36415 COLL VENOUS BLD VENIPUNCTURE: CPT

## 2021-04-21 PROCEDURE — 80061 LIPID PANEL: CPT

## 2021-04-21 PROCEDURE — 80053 COMPREHEN METABOLIC PANEL: CPT

## 2021-04-21 PROCEDURE — 85025 COMPLETE CBC W/AUTO DIFF WBC: CPT

## 2021-04-21 PROCEDURE — 84443 ASSAY THYROID STIM HORMONE: CPT

## 2021-05-03 ENCOUNTER — TELEPHONE (OUTPATIENT)
Dept: PODIATRY | Facility: CLINIC | Age: 51
End: 2021-05-03

## 2021-05-03 NOTE — TELEPHONE ENCOUNTER
Yoshi Hairston called to request a refill of Terbinafine to be sent over to [facility] in [place]  She knows you from the hospital and she said she talks to you  In her chart on 4/29/20 there was an order from you for Terbinafine but that is over a year ago  I will call her and have her schedule an appointment  I do not see that she was ever seen

## 2021-05-11 ENCOUNTER — TELEPHONE (OUTPATIENT)
Dept: FAMILY MEDICINE CLINIC | Facility: CLINIC | Age: 51
End: 2021-05-11

## 2021-05-11 DIAGNOSIS — H91.93 HEARING DIFFICULTY OF BOTH EARS: Primary | ICD-10-CM

## 2021-05-11 NOTE — TELEPHONE ENCOUNTER
Isai Taiwo has an appointment with Audiology on 06/04  She is asking for Lillian Zapata to put a referral in her chart? Chinaregina Taiwo is aware that Lillian Zapata is out of the office until Friday  She is ok to wait until then for a referral to be placed

## 2021-06-04 ENCOUNTER — OFFICE VISIT (OUTPATIENT)
Dept: AUDIOLOGY | Age: 51
End: 2021-06-04
Payer: COMMERCIAL

## 2021-06-04 DIAGNOSIS — H90.3 SENSORINEURAL HEARING LOSS, BILATERAL: Primary | ICD-10-CM

## 2021-06-04 PROCEDURE — 92557 COMPREHENSIVE HEARING TEST: CPT | Performed by: AUDIOLOGIST

## 2021-06-04 PROCEDURE — 92567 TYMPANOMETRY: CPT | Performed by: AUDIOLOGIST

## 2021-06-04 NOTE — PROGRESS NOTES
HEARING EVALUATION    Name:  Karen Grant  :  1970  Age:  45 y o  Date of Evaluation: 21     History: Difficulty Understanding  Reason for visit: Karen Grant is being seen today at the request of Dr Carol Ann Barragan for an evaluation of hearing  Patient reports difficulty understanding television, and that family notes that she has difficulty hearing  She denies tinnitus, ear pain or dizziness  She does report a history of recreational noise exposure (concerts)  EVALUATION:    Otoscopic Evaluation:   Right Ear: Clear and healthy ear canal and tympanic membrane   Left Ear: Clear and healthy ear canal and tympanic membrane    Tympanometry:   Right: Type A - normal middle ear pressure and compliance   Left: Type A - normal middle ear pressure and compliance    Audiogram Results:  Normal through 3k Hz dropping to mild/moderate high frequency sensorineural hearing loss with excellent speech discrimination ability under quiet conditions, bilaterally  *see attached audiogram      RECOMMENDATIONS:  Annual hearing eval, Return to [facility]  for F/U, Hearing Aid Evaluation and Copy to Patient/Caregiver    PATIENT EDUCATION:   Discussed results and recommendations with patient  Questions were addressed and the patient was encouraged to contact our department should concerns arise        Victorina Ferreira   Clinical Audiologist

## 2021-06-18 ENCOUNTER — APPOINTMENT (OUTPATIENT)
Dept: LAB | Facility: HOSPITAL | Age: 51
End: 2021-06-18
Payer: COMMERCIAL

## 2021-06-18 ENCOUNTER — CLINICAL SUPPORT (OUTPATIENT)
Dept: FAMILY MEDICINE CLINIC | Facility: CLINIC | Age: 51
End: 2021-06-18
Payer: COMMERCIAL

## 2021-06-18 DIAGNOSIS — Z79.899 ENCOUNTER FOR LONG-TERM (CURRENT) USE OF OTHER MEDICATIONS: ICD-10-CM

## 2021-06-18 DIAGNOSIS — Z23 NEED FOR TDAP VACCINATION: Primary | ICD-10-CM

## 2021-06-18 LAB
ALBUMIN SERPL BCP-MCNC: 3.8 G/DL (ref 3.5–5)
ALP SERPL-CCNC: 44 U/L (ref 46–116)
ALT SERPL W P-5'-P-CCNC: 16 U/L (ref 12–78)
AST SERPL W P-5'-P-CCNC: 29 U/L (ref 5–45)
BASOPHILS # BLD AUTO: 0.05 THOUSANDS/ΜL (ref 0–0.1)
BASOPHILS NFR BLD AUTO: 1 % (ref 0–1)
BILIRUB DIRECT SERPL-MCNC: 0.11 MG/DL (ref 0–0.2)
BILIRUB SERPL-MCNC: 0.52 MG/DL (ref 0.2–1)
EOSINOPHIL # BLD AUTO: 0.24 THOUSAND/ΜL (ref 0–0.61)
EOSINOPHIL NFR BLD AUTO: 3 % (ref 0–6)
ERYTHROCYTE [DISTWIDTH] IN BLOOD BY AUTOMATED COUNT: 12.1 % (ref 11.6–15.1)
HCT VFR BLD AUTO: 42.2 % (ref 34.8–46.1)
HGB BLD-MCNC: 13.8 G/DL (ref 11.5–15.4)
IMM GRANULOCYTES # BLD AUTO: 0.02 THOUSAND/UL (ref 0–0.2)
IMM GRANULOCYTES NFR BLD AUTO: 0 % (ref 0–2)
LYMPHOCYTES # BLD AUTO: 1.64 THOUSANDS/ΜL (ref 0.6–4.47)
LYMPHOCYTES NFR BLD AUTO: 23 % (ref 14–44)
MCH RBC QN AUTO: 33.4 PG (ref 26.8–34.3)
MCHC RBC AUTO-ENTMCNC: 32.7 G/DL (ref 31.4–37.4)
MCV RBC AUTO: 102 FL (ref 82–98)
MONOCYTES # BLD AUTO: 0.56 THOUSAND/ΜL (ref 0.17–1.22)
MONOCYTES NFR BLD AUTO: 8 % (ref 4–12)
NEUTROPHILS # BLD AUTO: 4.56 THOUSANDS/ΜL (ref 1.85–7.62)
NEUTS SEG NFR BLD AUTO: 65 % (ref 43–75)
NRBC BLD AUTO-RTO: 0 /100 WBCS
PLATELET # BLD AUTO: 281 THOUSANDS/UL (ref 149–390)
PMV BLD AUTO: 10 FL (ref 8.9–12.7)
PROT SERPL-MCNC: 7.6 G/DL (ref 6.4–8.2)
RBC # BLD AUTO: 4.13 MILLION/UL (ref 3.81–5.12)
WBC # BLD AUTO: 7.07 THOUSAND/UL (ref 4.31–10.16)

## 2021-06-18 PROCEDURE — 90471 IMMUNIZATION ADMIN: CPT

## 2021-06-18 PROCEDURE — RECHECK

## 2021-06-18 PROCEDURE — 85025 COMPLETE CBC W/AUTO DIFF WBC: CPT

## 2021-06-18 PROCEDURE — 80076 HEPATIC FUNCTION PANEL: CPT

## 2021-06-18 PROCEDURE — 36415 COLL VENOUS BLD VENIPUNCTURE: CPT

## 2021-06-18 PROCEDURE — 90715 TDAP VACCINE 7 YRS/> IM: CPT

## 2021-06-18 NOTE — PROGRESS NOTES
Assessment/Plan:    No problem-specific Assessment & Plan notes found for this encounter  Diagnoses and all orders for this visit:    Need for Tdap vaccination  -     TDAP VACCINE GREATER THAN OR EQUAL TO 8YO IM          Subjective:      Patient ID: Ni Austin is a 45 y o  female  HPI        Review of Systems      Objective: There were no vitals taken for this visit           Physical Exam

## 2021-07-22 ENCOUNTER — OFFICE VISIT (OUTPATIENT)
Dept: URGENT CARE | Age: 51
End: 2021-07-22
Payer: COMMERCIAL

## 2021-07-22 VITALS
BODY MASS INDEX: 27.8 KG/M2 | RESPIRATION RATE: 20 BRPM | HEIGHT: 66 IN | DIASTOLIC BLOOD PRESSURE: 80 MMHG | HEART RATE: 75 BPM | SYSTOLIC BLOOD PRESSURE: 112 MMHG | OXYGEN SATURATION: 99 % | TEMPERATURE: 97.5 F | WEIGHT: 173 LBS

## 2021-07-22 DIAGNOSIS — H10.9 CONJUNCTIVITIS OF BOTH EYES, UNSPECIFIED CONJUNCTIVITIS TYPE: Primary | ICD-10-CM

## 2021-07-22 PROCEDURE — G0382 LEV 3 HOSP TYPE B ED VISIT: HCPCS | Performed by: NURSE PRACTITIONER

## 2021-07-22 RX ORDER — TOBRAMYCIN 3 MG/ML
1 SOLUTION/ DROPS OPHTHALMIC
Qty: 5 ML | Refills: 0 | Status: SHIPPED | OUTPATIENT
Start: 2021-07-22 | End: 2022-05-27

## 2021-07-22 RX ORDER — TERBINAFINE HYDROCHLORIDE 250 MG/1
TABLET ORAL
COMMUNITY
Start: 2021-05-07 | End: 2022-03-18

## 2021-07-22 NOTE — PROGRESS NOTES
[facility]        NAME: Russel Fuentes is a 45 y o  female  : 1970    MRN: [id number]  DATE: 2021  TIME: 7:01 PM    Assessment and Plan   Conjunctivitis of both eyes, unspecified conjunctivitis type [H10 9]  1  Conjunctivitis of both eyes, unspecified conjunctivitis type  tobramycin (TOBREX) 0 3 % SOLN         Patient Instructions     Take med as directed  Cont use of steroid eye drops at home as directed  F/u with ophthal in AM  Follow up with PCP in 3-5 days  Proceed to  ER if symptoms worsen  Chief Complaint     Chief Complaint   Patient presents with    Eye Pain     bilateral eyes itching under eye lidd ,swolllen for 1 week  History of Present Illness       HPI   Reports eye irritation x 3 days  Red  Feels gritty  No trauma  Used Rx steroid ointment and dry eyes tear drops, with no improvement  Review of Systems   Review of Systems   Constitutional: Negative for chills and fever  HENT: Negative for rhinorrhea and sneezing  Eyes: Positive for photophobia, redness, itching and visual disturbance (decrease in vision, slightly blurry  says maybe bc of watery eyes)  Negative for discharge  Respiratory: Negative for chest tightness            Current Medications       Current Outpatient Medications:     b complex vitamins capsule, Take 1 capsule by mouth daily, Disp: , Rfl:     B-D ALLERGY SYRINGE 1CC/28G 28G X 1/2" 1 ML MISC, , Disp: , Rfl: 2    Cholecalciferol (VITAMIN D3) 2000 units capsule, Take 2 capsules by mouth daily  , Disp: , Rfl:     citalopram (CeleXA) 40 mg tablet, Take 1 tablet (40 mg total) by mouth daily, Disp: 90 tablet, Rfl: 3    cyclobenzaprine (FLEXERIL) 10 mg tablet, Take 1 tablet (10 mg total) by mouth 3 (three) times a day as needed for muscle spasms, Disp: 45 tablet, Rfl: 1    DUPIXENT 300 MG/2ML SOSY, , Disp: , Rfl:     fluticasone (FLONASE) 50 mcg/act nasal spray, 2 sprays into each nostril daily, Disp: , Rfl:     Plecanatide 3 MG TABS, Take 3 mg by mouth daily, Disp: 90 tablet, Rfl: 0    terbinafine (LamISIL) 250 mg tablet, , Disp: , Rfl:     topiramate (TOPAMAX) 100 mg tablet, Take 1 tablet (100 mg total) by mouth daily, Disp: 90 tablet, Rfl: 3    valACYclovir (VALTREX) 500 mg tablet, Take 500 mg by mouth daily  , Disp: , Rfl:     ALPRAZolam (XANAX) 0 25 mg tablet, Take 1 tablet (0 25 mg total) by mouth 3 (three) times a day as needed for anxiety (Patient taking differently: Take 0 25 mg by mouth as needed for anxiety ), Disp: 30 tablet, Rfl: 0    Amitiza 24 MCG capsule, Take 1 capsule (24 mcg total) by mouth 2 (two) times a day with meals (Patient not taking: Reported on 7/22/2021), Disp: 60 capsule, Rfl: 3    tobramycin (TOBREX) 0 3 % SOLN, Administer 1 drop to both eyes every 4 (four) hours while awake, Disp: 5 mL, Rfl: 0    Current Allergies     Allergies as of 07/22/2021    (No Known Allergies)            The following portions of the patient's history were reviewed and updated as appropriate: allergies, current medications, past family history, past medical history, past social history, past surgical history and problem list      Past Medical History:   Diagnosis Date    Anxiety     Asthma     Last assessed 9/25/2014    Colon polyp     Constipation     Eczema     Irritable bowel syndrome (IBS)     Seasonal allergies     [place] teeth extracted        Past Surgical History:   Procedure Laterality Date    COLONOSCOPY      HYSTERECTOMY  2015    TONSILLECTOMY AND ADENOIDECTOMY      WISDOM TOOTH EXTRACTION         Family History   Problem Relation Age of Onset    Hypertension Mother     Colon cancer Father 50    No Known Problems Sister     No Known Problems Daughter     No Known Problems Maternal Grandmother     No Known Problems Maternal Grandfather     No Known Problems Paternal Grandmother     No Known Problems Paternal Grandfather     No Known Problems Maternal Aunt          Medications have been verified  Objective   /80   Pulse 75   Temp 97 5 °F (36 4 °C) (Temporal)   Resp 20   Ht 5' 6" (1 676 m)   Wt 78 5 kg (173 lb)   SpO2 99%   BMI 27 92 kg/m²   No LMP recorded  Patient has had a hysterectomy  Physical Exam     Physical Exam  Constitutional:       Appearance: She is not ill-appearing  Eyes:      General:         Right eye: Discharge (watery) present  Left eye: Discharge (watery) present  Extraocular Movements: Extraocular movements intact  Pupils: Pupils are equal, round, and reactive to light  Comments: Moderate to severe erythema in both conjunctiva and mucous membranes of the B/L eyelids   Neurological:      Mental Status: She is alert

## 2021-07-22 NOTE — PATIENT INSTRUCTIONS

## 2021-07-22 NOTE — LETTER
July 22, 2021     Patient: Sarika Pillai   YOB: 1970   Date of Visit: 7/22/2021       To Whom it May Concern:    Sarika Pillai was seen in my clinic on 7/22/2021  She may return to work 07/24/2021  If you have any questions or concerns, please don't hesitate to call           Sincerely,          [facility]        CC: No Recipients

## 2021-08-02 ENCOUNTER — APPOINTMENT (OUTPATIENT)
Dept: LAB | Age: 51
End: 2021-08-02
Payer: COMMERCIAL

## 2021-08-02 DIAGNOSIS — Z79.899 ENCOUNTER FOR LONG-TERM (CURRENT) USE OF OTHER MEDICATIONS: ICD-10-CM

## 2021-08-02 LAB
ALBUMIN SERPL BCP-MCNC: 3.6 G/DL (ref 3.5–5)
ALP SERPL-CCNC: 46 U/L (ref 46–116)
ALT SERPL W P-5'-P-CCNC: 17 U/L (ref 12–78)
AST SERPL W P-5'-P-CCNC: 18 U/L (ref 5–45)
BASOPHILS # BLD AUTO: 0.07 THOUSANDS/ΜL (ref 0–0.1)
BASOPHILS NFR BLD AUTO: 1 % (ref 0–1)
BILIRUB DIRECT SERPL-MCNC: 0.1 MG/DL (ref 0–0.2)
BILIRUB SERPL-MCNC: 0.52 MG/DL (ref 0.2–1)
EOSINOPHIL # BLD AUTO: 0.17 THOUSAND/ΜL (ref 0–0.61)
EOSINOPHIL NFR BLD AUTO: 2 % (ref 0–6)
ERYTHROCYTE [DISTWIDTH] IN BLOOD BY AUTOMATED COUNT: 12.4 % (ref 11.6–15.1)
HCT VFR BLD AUTO: 42.9 % (ref 34.8–46.1)
HGB BLD-MCNC: 13.7 G/DL (ref 11.5–15.4)
IMM GRANULOCYTES # BLD AUTO: 0.02 THOUSAND/UL (ref 0–0.2)
IMM GRANULOCYTES NFR BLD AUTO: 0 % (ref 0–2)
LYMPHOCYTES # BLD AUTO: 2.06 THOUSANDS/ΜL (ref 0.6–4.47)
LYMPHOCYTES NFR BLD AUTO: 29 % (ref 14–44)
MCH RBC QN AUTO: 32.6 PG (ref 26.8–34.3)
MCHC RBC AUTO-ENTMCNC: 31.9 G/DL (ref 31.4–37.4)
MCV RBC AUTO: 102 FL (ref 82–98)
MONOCYTES # BLD AUTO: 0.63 THOUSAND/ΜL (ref 0.17–1.22)
MONOCYTES NFR BLD AUTO: 9 % (ref 4–12)
NEUTROPHILS # BLD AUTO: 4.25 THOUSANDS/ΜL (ref 1.85–7.62)
NEUTS SEG NFR BLD AUTO: 59 % (ref 43–75)
NRBC BLD AUTO-RTO: 0 /100 WBCS
PLATELET # BLD AUTO: 284 THOUSANDS/UL (ref 149–390)
PMV BLD AUTO: 11.3 FL (ref 8.9–12.7)
PROT SERPL-MCNC: 7.6 G/DL (ref 6.4–8.2)
RBC # BLD AUTO: 4.2 MILLION/UL (ref 3.81–5.12)
WBC # BLD AUTO: 7.2 THOUSAND/UL (ref 4.31–10.16)

## 2021-08-02 PROCEDURE — 85025 COMPLETE CBC W/AUTO DIFF WBC: CPT

## 2021-08-02 PROCEDURE — 36415 COLL VENOUS BLD VENIPUNCTURE: CPT

## 2021-08-02 PROCEDURE — 80076 HEPATIC FUNCTION PANEL: CPT

## 2021-08-21 ENCOUNTER — IMMUNIZATIONS (OUTPATIENT)
Dept: FAMILY MEDICINE CLINIC | Facility: HOSPITAL | Age: 51
End: 2021-08-21

## 2021-08-21 DIAGNOSIS — Z23 ENCOUNTER FOR IMMUNIZATION: Primary | ICD-10-CM

## 2021-08-21 PROCEDURE — 0001A SARS-COV-2 / COVID-19 MRNA VACCINE (PFIZER-BIONTECH) 30 MCG: CPT

## 2021-08-21 PROCEDURE — 91300 SARS-COV-2 / COVID-19 MRNA VACCINE (PFIZER-BIONTECH) 30 MCG: CPT

## 2021-09-22 DIAGNOSIS — A60.09 HERPES GENITALIS IN WOMEN: Primary | ICD-10-CM

## 2021-09-22 RX ORDER — VALACYCLOVIR HYDROCHLORIDE 500 MG/1
500 TABLET, FILM COATED ORAL 2 TIMES DAILY
Qty: 180 TABLET | Refills: 3 | Status: SHIPPED | OUTPATIENT
Start: 2021-09-22 | End: 2022-05-27 | Stop reason: SDUPTHER

## 2021-10-25 ENCOUNTER — TELEPHONE (OUTPATIENT)
Dept: NEUROSURGERY | Facility: CLINIC | Age: 51
End: 2021-10-25

## 2021-10-29 ENCOUNTER — OFFICE VISIT (OUTPATIENT)
Dept: DERMATOLOGY | Facility: CLINIC | Age: 51
End: 2021-10-29
Payer: COMMERCIAL

## 2021-10-29 VITALS — TEMPERATURE: 98 F | HEIGHT: 66 IN | BODY MASS INDEX: 27.8 KG/M2 | WEIGHT: 173 LBS

## 2021-10-29 DIAGNOSIS — Z51.81 MEDICATION MONITORING ENCOUNTER: ICD-10-CM

## 2021-10-29 DIAGNOSIS — L20.9 ATOPIC DERMATITIS, UNSPECIFIED TYPE: Primary | ICD-10-CM

## 2021-10-29 DIAGNOSIS — Z79.899 HIGH RISK MEDICATION USE: ICD-10-CM

## 2021-10-29 PROCEDURE — 99203 OFFICE O/P NEW LOW 30 MIN: CPT | Performed by: DERMATOLOGY

## 2022-01-03 ENCOUNTER — HOSPITAL ENCOUNTER (OUTPATIENT)
Dept: RADIOLOGY | Age: 52
Discharge: HOME | End: 2022-01-03
Payer: COMMERCIAL

## 2022-01-03 VITALS — BODY MASS INDEX: 27.8 KG/M2 | HEIGHT: 66 IN | WEIGHT: 173 LBS

## 2022-01-03 DIAGNOSIS — Z12.31 ENCOUNTER FOR SCREENING MAMMOGRAM FOR MALIGNANT NEOPLASM OF BREAST: ICD-10-CM

## 2022-01-03 PROCEDURE — 77067 SCR MAMMO BI INCL CAD: CPT

## 2022-01-03 PROCEDURE — 77063 BREAST TOMOSYNTHESIS BI: CPT

## 2022-02-05 ENCOUNTER — IMMUNIZATIONS (OUTPATIENT)
Dept: FAMILY MEDICINE CLINIC | Facility: HOSPITAL | Age: 52
End: 2022-02-05

## 2022-02-05 DIAGNOSIS — Z23 ENCOUNTER FOR IMMUNIZATION: Primary | ICD-10-CM

## 2022-02-05 PROCEDURE — 0001A COVID-19 PFIZER VACC 0.3 ML: CPT

## 2022-02-05 PROCEDURE — 91300 COVID-19 PFIZER VACC 0.3 ML: CPT

## 2022-03-08 DIAGNOSIS — F32.A DEPRESSION, UNSPECIFIED DEPRESSION TYPE: ICD-10-CM

## 2022-03-09 RX ORDER — CITALOPRAM 40 MG/1
40 TABLET ORAL DAILY
Qty: 90 TABLET | Refills: 0 | Status: SHIPPED | OUTPATIENT
Start: 2022-03-09 | End: 2022-05-27 | Stop reason: SDUPTHER

## 2022-03-18 ENCOUNTER — OFFICE VISIT (OUTPATIENT)
Dept: FAMILY MEDICINE CLINIC | Facility: CLINIC | Age: 52
End: 2022-03-18
Payer: COMMERCIAL

## 2022-03-18 VITALS
SYSTOLIC BLOOD PRESSURE: 103 MMHG | OXYGEN SATURATION: 98 % | HEART RATE: 88 BPM | RESPIRATION RATE: 16 BRPM | WEIGHT: 178.6 LBS | HEIGHT: 65 IN | TEMPERATURE: 98.4 F | BODY MASS INDEX: 29.76 KG/M2 | DIASTOLIC BLOOD PRESSURE: 80 MMHG

## 2022-03-18 DIAGNOSIS — E66.09 CLASS 1 OBESITY DUE TO EXCESS CALORIES WITHOUT SERIOUS COMORBIDITY WITH BODY MASS INDEX (BMI) OF 30.0 TO 30.9 IN ADULT: ICD-10-CM

## 2022-03-18 DIAGNOSIS — Z00.00 ANNUAL PHYSICAL EXAM: Primary | ICD-10-CM

## 2022-03-18 DIAGNOSIS — G43.809 OTHER MIGRAINE WITHOUT STATUS MIGRAINOSUS, NOT INTRACTABLE: ICD-10-CM

## 2022-03-18 PROBLEM — E66.811 CLASS 1 OBESITY DUE TO EXCESS CALORIES WITHOUT SERIOUS COMORBIDITY WITH BODY MASS INDEX (BMI) OF 30.0 TO 30.9 IN ADULT: Status: ACTIVE | Noted: 2022-03-18

## 2022-03-18 PROCEDURE — 99396 PREV VISIT EST AGE 40-64: CPT | Performed by: NURSE PRACTITIONER

## 2022-03-18 NOTE — PATIENT INSTRUCTIONS

## 2022-03-18 NOTE — PROGRESS NOTES
[address]    NAME: Nubia Galvan  AGE: 45 y o  SEX: female  : 1970     DATE: 3/18/2022     Assessment and Plan:     Problem List Items Addressed This Visit        Cardiovascular and Mediastinum    Migraine    Relevant Orders    Comprehensive metabolic panel    CBC and differential    Lipid Panel with Direct LDL reflex    TSH, 3rd generation with Free T4 reflex       Other    Class 1 obesity due to excess calories without serious comorbidity with body mass index (BMI) of 30 0 to 30 9 in adult    Relevant Medications    Semaglutide-Weight Management (WEGOVY) 0 25 MG/0 5ML    Insulin Pen Needle 31G X 8 MM MISC    Other Relevant Orders    Comprehensive metabolic panel    CBC and differential    Lipid Panel with Direct LDL reflex    TSH, 3rd generation with Free T4 reflex      Other Visit Diagnoses     Annual physical exam    -  Primary          Immunizations and preventive care screenings were discussed with patient today  Appropriate education was printed on patient's after visit summary  Counseling:  Alcohol/drug use: discussed moderation in alcohol intake, the recommendations for healthy alcohol use, and avoidance of illicit drug use  Dental Health: discussed importance of regular tooth brushing, flossing, and dental visits  Injury prevention: discussed safety/seat belts, safety helmets, smoke detectors, carbon dioxide detectors, and smoking near bedding or upholstery  Sexual health: discussed sexually transmitted diseases, partner selection, use of condoms, avoidance of unintended pregnancy, and contraceptive alternatives  · Exercise: the importance of regular exercise/physical activity was discussed  Recommend exercise 3-5 times per week for at least 30 minutes  BMI Counseling: Body mass index is 29 58 kg/m²   The BMI is above normal  Nutrition recommendations include decreasing portion sizes, encouraging healthy No future appts scheduled @ this time.   choices of fruits and vegetables, decreasing fast food intake, consuming healthier snacks, limiting drinks that contain sugar, moderation in carbohydrate intake, increasing intake of lean protein, reducing intake of saturated and trans fat and reducing intake of cholesterol  Exercise recommendations include moderate physical activity 150 minutes/week, exercising 3-5 times per week and strength training exercises  Pharmacotherapy was ordered to help aid in weight loss  Rationale for BMI follow-up plan is due to patient being overweight or obese  Return in about 6 weeks (around 4/29/2022) for Recheck  Chief Complaint:     Chief Complaint   Patient presents with    Physical Exam      History of Present Illness:     Adult Annual Physical   Patient here for a comprehensive physical exam  The patient reports problems - overweight  Exercising 5 days a week, on weight watchers and still no weight loss  Has tried protein shakes, protein bars, counting calories  Has tried phentermine with no results  Diet and Physical Activity  · Diet/Nutrition: well balanced diet  · Exercise: 5-7 times a week on average  Depression Screening  PHQ-2/9 Depression Screening         General Health  · Sleep: sleeps well  · Hearing: normal - bilateral   · Vision: no vision problems  · Dental: regular dental visits  /GYN Health  · Patient is: postmenopausal  ·      Review of Systems:     Review of Systems   Constitutional: Negative for fatigue and fever  HENT: Negative for congestion, rhinorrhea and sinus pain  Eyes: Negative for photophobia and visual disturbance  Respiratory: Negative for cough, shortness of breath and wheezing  Cardiovascular: Negative for chest pain and palpitations  Gastrointestinal: Negative for constipation, diarrhea, nausea and vomiting  Genitourinary: Negative for dysuria and frequency  Musculoskeletal: Negative for arthralgias and myalgias  Skin: Negative for rash  Neurological: Negative for dizziness and headaches  Hematological: Negative for adenopathy  Psychiatric/Behavioral: Negative for dysphoric mood and sleep disturbance  The patient is not nervous/anxious  Past Medical History:     Past Medical History:   Diagnosis Date    Anxiety     Asthma     Last assessed 2014    Colon polyp     Constipation     Eczema     Irritable bowel syndrome (IBS)     Seasonal allergies     [place] teeth extracted       Past Surgical History:     Past Surgical History:   Procedure Laterality Date    COLONOSCOPY      HYSTERECTOMY  2015    TONSILLECTOMY AND ADENOIDECTOMY      WISDOM TOOTH EXTRACTION        Social History:     Social History     Socioeconomic History    Marital status: /Civil Union     Spouse name: None    Number of children: None    Years of education: None    Highest education level: None   Occupational History    None   Tobacco Use    Smoking status: Former Smoker     Packs/day: 0 25     Years: 10 00     Pack years: 2 50     Types: Cigarettes     Start date:      Quit date: 1993     Years since quittin 6    Smokeless tobacco: Never Used   Vaping Use    Vaping Use: Never used   Substance and Sexual Activity    Alcohol use:  Yes     Alcohol/week: 3 0 standard drinks     Types: 3 Glasses of wine per week    Drug use: Never    Sexual activity: Yes     Partners: Male   Other Topics Concern    None   Social History Narrative    None     Social Determinants of Health     Financial Resource Strain: Not on file   Food Insecurity: Not on file   Transportation Needs: Not on file   Physical Activity: Not on file   Stress: Not on file   Social Connections: Not on file   Intimate Partner Violence: Not on file   Housing Stability: Not on file      Family History:     Family History   Problem Relation Age of Onset    Hypertension Mother     Colon cancer Father 50    No Known Problems Sister     No Known Problems Daughter     No Known Problems Maternal Grandmother     No Known Problems Maternal Grandfather     No Known Problems Paternal Grandmother     No Known Problems Paternal Grandfather     No Known Problems Maternal Aunt       Current Medications:     Current Outpatient Medications   Medication Sig Dispense Refill    b complex vitamins capsule Take 1 capsule by mouth daily      B-D ALLERGY SYRINGE 1CC/28G 28G X 1/2" 1 ML MISC   2    Cholecalciferol (VITAMIN D3) 2000 units capsule Take 2 capsules by mouth daily        citalopram (CeleXA) 40 mg tablet Take 1 tablet (40 mg total) by mouth daily 90 tablet 0    DUPIXENT 300 MG/2ML SOSY       fluticasone (FLONASE) 50 mcg/act nasal spray 2 sprays into each nostril daily      Plecanatide 3 MG TABS Take 3 mg by mouth daily 90 tablet 3    tobramycin (TOBREX) 0 3 % SOLN Administer 1 drop to both eyes every 4 (four) hours while awake 5 mL 0    topiramate (TOPAMAX) 100 mg tablet Take 1 tablet (100 mg total) by mouth daily 90 tablet 1    ALPRAZolam (XANAX) 0 25 mg tablet Take 1 tablet (0 25 mg total) by mouth 3 (three) times a day as needed for anxiety (Patient not taking: Reported on 10/29/2021) 30 tablet 0    Insulin Pen Needle 31G X 8 MM MISC Use once a week 50 each 0    Semaglutide-Weight Management (WEGOVY) 0 25 MG/0 5ML Inject 0 5 mL (0 25 mg total) under the skin once a week for 4 doses 2 mL 0    valACYclovir (VALTREX) 500 mg tablet Take 1 tablet (500 mg total) by mouth 2 (two) times a day 180 tablet 3     No current facility-administered medications for this visit  Allergies:     No Known Allergies   Physical Exam:     /80 (BP Location: Left arm)   Pulse 88   Temp 98 4 °F (36 9 °C)   Resp 16   Ht 5' 5 16" (1 655 m)   Wt 81 kg (178 lb 9 6 oz)   SpO2 98%   BMI 29 58 kg/m²     Physical Exam  Vitals and nursing note reviewed  Constitutional:       Appearance: Normal appearance  She is obese  HENT:      Head: Normocephalic and atraumatic        Right Ear: Tympanic membrane, ear canal and external ear normal       Left Ear: Tympanic membrane, ear canal and external ear normal       Nose: Nose normal       Mouth/Throat:      Mouth: Mucous membranes are moist    Eyes:      Conjunctiva/sclera: Conjunctivae normal    Cardiovascular:      Rate and Rhythm: Normal rate and regular rhythm  Heart sounds: Normal heart sounds  Pulmonary:      Effort: Pulmonary effort is normal       Breath sounds: Normal breath sounds  Abdominal:      General: Bowel sounds are normal    Musculoskeletal:         General: Normal range of motion  Cervical back: Normal range of motion and neck supple  Skin:     General: Skin is warm and dry  Capillary Refill: Capillary refill takes less than 2 seconds  Neurological:      General: No focal deficit present  Mental Status: She is alert and oriented to person, place, and time  Psychiatric:         Mood and Affect: Mood normal          Behavior: Behavior normal          Thought Content:  Thought content normal          Judgment: Judgment normal           Akira Osman, [address] [place] [address],[facility] [address]

## 2022-03-23 ENCOUNTER — TELEPHONE (OUTPATIENT)
Dept: FAMILY MEDICINE CLINIC | Facility: CLINIC | Age: 52
End: 2022-03-23

## 2022-03-23 NOTE — TELEPHONE ENCOUNTER
Pt called re: she checked with [employer]) and they stated that the prior auth for Chely Odonnell was never submitted  Pt stated that it was initiated in office during her last appointment  Pt requesting the auth be submitted for review         Please advise

## 2022-03-28 ENCOUNTER — OFFICE VISIT (OUTPATIENT)
Dept: DERMATOLOGY | Facility: CLINIC | Age: 52
End: 2022-03-28
Payer: COMMERCIAL

## 2022-03-28 VITALS — BODY MASS INDEX: 29.82 KG/M2 | WEIGHT: 179 LBS | TEMPERATURE: 97.2 F | HEIGHT: 65 IN

## 2022-03-28 DIAGNOSIS — D22.60 MULTIPLE BENIGN MELANOCYTIC NEVI OF UPPER AND LOWER EXTREMITIES AND TRUNK: ICD-10-CM

## 2022-03-28 DIAGNOSIS — D22.5 MULTIPLE BENIGN MELANOCYTIC NEVI OF UPPER AND LOWER EXTREMITIES AND TRUNK: ICD-10-CM

## 2022-03-28 DIAGNOSIS — L81.4 SOLAR LENTIGO: ICD-10-CM

## 2022-03-28 DIAGNOSIS — L20.9 ATOPIC DERMATITIS, UNSPECIFIED TYPE: Primary | ICD-10-CM

## 2022-03-28 DIAGNOSIS — D22.70 MULTIPLE BENIGN MELANOCYTIC NEVI OF UPPER AND LOWER EXTREMITIES AND TRUNK: ICD-10-CM

## 2022-03-28 DIAGNOSIS — D18.01 CHERRY ANGIOMA: ICD-10-CM

## 2022-03-28 PROCEDURE — 99213 OFFICE O/P EST LOW 20 MIN: CPT | Performed by: DERMATOLOGY

## 2022-03-28 NOTE — PATIENT INSTRUCTIONS
LENTIGO    Assessment and Plan:  Based on a thorough discussion of this condition and the management approach to it (including a comprehensive discussion of the known risks, side effects and potential benefits of treatment), the patient (family) agrees to implement the following specific plan:   Monitor for changes   When outside we recommend using a wide brim hat, sunglasses, long sleeve and pants, sunscreen with SPF 74+ with reapplication every 2 hours, or SPF specific clothing     What is a lentigo? A lentigo is a pigmented flat or slightly raised lesion with a clearly defined edge  Unlike an ephelis (freckle), it does not fade in the winter months  There are several kinds of lentigo  The name lentigo originally referred to its appearance resembling a small lentil  The plural of lentigo is lentigines, although lentigos is also in common use      ATOPIC DERMATITIS     Assessment and Plan:  Based on a thorough discussion of this condition and the management approach to it (including a comprehensive discussion of the known risks, side effects and potential benefits of treatment), the patient (family) agrees to implement the following specific plan:   Continue Dupixent injection every other week    Continue at home phototherapy done    Follow up in 1 year       MELANOCYTIC NEVI ("Moles")    Assessment and Plan:  Based on a thorough discussion of this condition and the management approach to it (including a comprehensive discussion of the known risks, side effects and potential benefits of treatment), the patient (family) agrees to implement the following specific plan:   Monitor for changes   When outside we recommend using a wide brim hat, sunglasses, long sleeve and pants, sunscreen with SPF 74+ with reapplication every 2 hours, or SPF specific clothing    Routine skin exam recommended every year      Melanocytic Nevi  Melanocytic nevi ("moles") are caused by collections of the color producing skin cells, or melanocytes, in 1 area in the skin  They can range in color from pink to dark brown and be either raised or flat  Some moles are present at birth (I e , "congenital nevi"), while others come up later in life (i e , "acquired nevi")  Mat Press exposure also stimulates the body to make more moles, ie the more sun you get the more moles you'll grow  Clinically distinguishing a healthy mole from melanoma may be difficult  The "ABCDE's" of moles have been suggested as a means of helping to alert a person to a suspicious mole and the possible increased risk of melanoma  Asymmetry: Healthy moles tend to be symmetric, while melanomas are often asymmetric  Asymmetry means if you draw a line through the mole, the two halves do not match in color, size, shape, or surface texture Any mole that starts to demonstrate "asymmetry" should be examined promptly by a board certified dermatologist      Border: Healthy moles tend to have discrete, even borders  The border of a melanoma often blends into the normal skin and does not sharply delineate the mole from normal skin  Any mole that starts to demonstrate "uneven borders" should be examined promptly     Color: Healthy moles tend to be one color throughout  Melanomas tend to be made up of different colors ranging from dark black, blue, white, or red  Any mole that demonstrates a color change should be examined promptly    Diameter: Healthy moles tend to be smaller than 0 6 cm in size; an exception are "congenital nevi" that can be larger  Melanomas tend to grow and can often be greater than 0 6 cm (1/4 of an inch, or the size of a pencil eraser)  This is only a guideline, and many normal moles may be larger than 0 6 cm without being unhealthy    Any mole that starts to change in size (small to bigger or bigger to smaller) should be examined promptly    Evolving: Healthy moles tend to "stay the same "  Melanomas may often show signs of change or evolution such as a change in size, shape, color, or elevation  Any mole that starts to itch, bleed, crust, burn, hurt, or ulcerate or demonstrate a change or evolution should be examined promptly by a board certified dermatologist     STOVER ANGIOMAS    Assessment and Plan:  Based on a thorough discussion of this condition and the management approach to it (including a comprehensive discussion of the known risks, side effects and potential benefits of treatment), the patient (family) agrees to implement the following specific plan:   Reassured benign     Assessment and Plan:    Cherry angioma, also known as Riki Rena spots, are benign vascular skin lesions  A "cherry angioma" is a firm red, blue or purple papule, 0 1-1 cm in diameter  When thrombosed, they can appear black in colour until evaluated with a dermatoscope when the red or purple colour is more easily seen  Cherry angioma may develop on any part of the body but most often appear on the scalp, face, lips and trunk  An angioma is due to proliferating endothelial cells; these are the cells that line the inside of a blood vessel

## 2022-03-28 NOTE — PROGRESS NOTES
[facility] Follow Up Note    Patient Name: Chelle Olivera  Encounter Date: 03/28/2022    Today's Chief Concerns:  Skyler Shilisa Concern #1:  Full skin exam        Current Medications:    Current Outpatient Medications:     b complex vitamins capsule, Take 1 capsule by mouth daily, Disp: , Rfl:     Cholecalciferol (VITAMIN D3) 2000 units capsule, Take 2 capsules by mouth daily  , Disp: , Rfl:     citalopram (CeleXA) 40 mg tablet, Take 1 tablet (40 mg total) by mouth daily, Disp: 90 tablet, Rfl: 0    DUPIXENT 300 MG/2ML SOSY, , Disp: , Rfl:     fluticasone (FLONASE) 50 mcg/act nasal spray, 2 sprays into each nostril daily, Disp: , Rfl:     Plecanatide 3 MG TABS, Take 3 mg by mouth daily, Disp: 90 tablet, Rfl: 3    Semaglutide-Weight Management (WEGOVY) 0 25 MG/0 5ML, Inject 0 5 mL (0 25 mg total) under the skin once a week for 4 doses, Disp: 2 mL, Rfl: 0    topiramate (TOPAMAX) 100 mg tablet, Take 1 tablet (100 mg total) by mouth daily, Disp: 90 tablet, Rfl: 1    ALPRAZolam (XANAX) 0 25 mg tablet, Take 1 tablet (0 25 mg total) by mouth 3 (three) times a day as needed for anxiety (Patient not taking: Reported on 10/29/2021), Disp: 30 tablet, Rfl: 0    B-D ALLERGY SYRINGE 1CC/28G 28G X 1/2" 1 ML MISC, , Disp: , Rfl: 2    Insulin Pen Needle 31G X 8 MM MISC, Use once a week, Disp: 50 each, Rfl: 0    tobramycin (TOBREX) 0 3 % SOLN, Administer 1 drop to both eyes every 4 (four) hours while awake (Patient not taking: Reported on 3/28/2022 ), Disp: 5 mL, Rfl: 0    valACYclovir (VALTREX) 500 mg tablet, Take 1 tablet (500 mg total) by mouth 2 (two) times a day, Disp: 180 tablet, Rfl: 3    CONSTITUTIONAL:   Vitals:    03/28/22 1603   Temp: (!) 97 2 °F (36 2 °C)   TempSrc: Temporal   Weight: 81 2 kg (179 lb)   Height: 5' 5" (1 651 m)         Specific Alerts:    Have you been seen by a [facility] in the last 3 years? YES    Are you pregnant or planning to become pregnant?  No    Are you currently or planning to be nursing or breast feeding? No    No Known Allergies    May we call your Preferred Phone number to discuss your specific medical information? YES    May we leave a detailed message that includes your specific medical information? YES    Have you traveled outside of the [facility] in the past 3 months? No    Do you currently have a pacemaker or defibrillator? No    Do you have any artificial heart valves, joints, plates, screws, rods, stents, pins, etc? No   - If Yes, were any placed within the last 2 years? Do you require any medications prior to a surgical procedure? No       Are you taking any medications that cause you to bleed more easily ("blood thinners") No    Have you ever experienced a rapid heartbeat with epinephrine? No    Have you ever been treated with "gold" (gold sodium thiomalate) therapy? No    [facility] can help with wrinkles, "laugh lines," facial volume loss, "double chin," "love handles," age spots, and more  Are you interested in learning today about some of the skin enhancement procedures that we offer? (If Yes, please provide more detail) No    Review of Systems:  Have you recently had or currently have any of the following?     · Fever or chills: No   · Night Sweats: No  · Headaches: No  · Weight Gain: No  · Weight Loss: No  · Blurry Vision: No  · Nausea: No  · Vomiting: No  · Diarrhea: No  · Blood in Stool: No  · Abdominal Pain: No  · Itchy Skin: No  · Painful Joints: No  · Swollen Joints: No  · Muscle Pain: No  · Irregular Mole: No  · Sun Burn: No  · Dry Skin: No  · Skin Color Changes: No  · Scar or Keloid: No  · Cold Sores/Fever Blisters: No  · Bacterial Infections/MRSA: No  · Anxiety: No  · Depression: No  · Suicidal or Homicidal Thoughts: No      PSYCH: Normal mood and affect  EYES: Normal conjunctiva  ENT: Normal lips and oral mucosa  CARDIOVASCULAR: No edema  RESPIRATORY: Normal respirations  HEME/LYMPH/IMMUNO:  No regional lymphadenopathy except as noted below in [address] [place] SKIN EXAM (SKIN)  Hair, Scalp, Ears, Face Normal except as noted below in Assessment   Neck, Cervical Chain Nodes Normal except as noted below in Assessment   Right Arm/Hand/Fingers Normal except as noted below in Assessment   Left Arm/Hand/Fingers Normal except as noted below in Assessment   Chest/Breasts/Axillae Viewed areas Normal except as noted below in Assessment   Abdomen, Umbilicus Normal except as noted below in Assessment   Back/Spine Normal except as noted below in Assessment   Groin/Genitalia/Buttocks Viewed areas Normal except as noted below in Assessment   Right Leg, Foot, Toes Normal except as noted below in Assessment   Left Leg, Foot, Toes Normal except as noted below in Assessment       ATOPIC DERMATITIS     Physical Exam:   Anatomic Location Affected:  Trunk and extremities    Morphological Description:  Clear today    Severity: moderate   Pertinent Positives:   Pertinent Negatives: Additional History of Present Condition:  Much improved while on Dupixent injection every other week and phototherapy at home    Assessment and Plan:Based on a thorough discussion of this condition and the management approach to it (including a comprehensive discussion of the known risks, side effects and potential benefits of treatment), the patient (family) agrees to implement the following specific plan:   Continue Dupixent injection every other week    Continue at home phototherapy as directed      Assessment and Plan:   Atopic Dermatitis is a chronic, itchy skin condition that is very common in children but may occur at any age  It is also known as eczema or atopic eczema   It is the most common form of dermatitis  Atopic dermatitis usually occurs in people who have an atopic tendency    This means they may develop any or all of these closely linked conditions:   Atopic dermatitis, asthma, hay fever (allergic rhinitis), eosinophilic esophagitis, and gastroenteritis  Often these conditions run within families with a parent, child or sibling also affected  A family history of asthma, eczema or hay fever is particularly useful in diagnosing atopic dermatitis in infants  Atopic dermatitis arises because of a complex interaction of genetic and environmental factors  These include defects in skin barrier function making the skin more susceptible to irritation by soap and other contact irritants, the weather, temperature and non-specific triggers  There is also an element of immune system dysregulation that is often present  By definition, it is chronic and has a "waxing-waning" nature; flares should be expected but with good education and treatment strategies can be minimized  Your treatment plan   Using only mild cleansers (hypoallergenic and without fragrances) and fragrance free detergent (not unscented products which contain a masking agent)   Apply moisturizer to entire body at least 2 times a day   Use the above listed medication to affected areas twice a day for a full 2 days after the rash has cleared   Take on over the counter antihistamine like diphenhydramine before bed if the itching is keeping you awake              ATOPIC DERMATITIS FAQS    WHAT IS ATOPIC DERMATITIS? Atopic dermatitis (also called eczema) results from a weak skin barrier and an over active immune system  The weak skin barrier allows things to get into the skin and then the immune system has an intense reaction to this substances  The result is itchy red patches anywhere on the body  WHO GETS ATOPIC DERMATITIS? There is no single answer because many factors are involved  It is likely a combination of genetic makeup and environmental triggers and/or exposures  People with atopic dermatitis are prone to other types of "atopy"    They are at increased risk for food allergies, Asthma and hay fever and there is often a family history of these problems as well  WHAT ABOUT FOOD ALLERGIES? This is a very controversial topic, as many believe that food allergies are responsible for skin flares  In some cases, specific foods may cause worsening of atopic dermatitis; however this occurs in a minority of cases and usually happens within a few hours of ingestion  While food allergy is more common in children with eczema, foods are specific triggers for flares in only a small percentage of children  If you notice that the skin flares after certain foods you can see if eliminating one food at time makes a difference, as long as your child can still enjoy a well-balanced diet  There are blood (RAST) and skin (PRICK) tests that can check for allergies, but they are often positive in children who are not truly allergic  Therefore it is important that you work with your allergist and dermatologist to determine which foods are relevant and causing true symptoms  Extreme food elimination diets without the guidance of your doctor, which have become more popular in recent years, may even result in worsening of the skin rash due to malnutrition and avoidance of essential nutrients  WHY SO MUCH MOISTURIZER? This is the 1st step and most important part of treatment  This helps maintaint the skin's barrier function and prevent flares  Creams and ointments are preferable over lotions  Aveeno eczema relief and Eucerin eczema relief  and cerave are all good ones to start with  It is best to put  moisturizer on directly after bathing, while the skin is damp, it is twice as effective then  You may bathe daily  Use warm - not hot - water, for short periods of time (5-10 minutes at a time) Dry off by Lightly patting the skin with a towel and not rubbing  While the skin is still damp, (within 3 minutes) apply any medications to the rashy areas 1st and then moisturize the entire body   It's best to apply the medication 1st but if the medications sting, moisturizer can be applied 1st     WHY USE THE MEDICATION FOR AN EXTRA 2 DAYS AFTER THE RASH IS GONE? Sometimes the rash may appear to be gone, but there are still microscopic changes in the skin  Using the medication and extra 2 days will ensure the inflammation has resolve and make the rash less likely to return quickly  WHAT ARE TRIGGERS?   Occasionally there are specific things that trigger itching and rashes, but in many cases may none can be identified  The most common triggers are:   Heat and sweat for some individuals, cold weather for others   House dust mites, pet fur   Wool; synthetic fabrics like nylon; dyed fabrics   Tobacco smoke    Fragrances in: shampoos, soaps, lotions, laundry detergents, fabric softeners   Saliva or prolonged exposure to water  start with these  Avoid the use of fabric softeners in the washing machine or dryer sheets (unless they are fragrance-free)  Try to use laundry detergents, soaps and shampoos that are fragrance-free  You may find it helpful to double-rinse your clothes  Some children are sensitive to house dust mites and they may benefit from a plastic mattress wrap  While food allergy is more common in children with eczema, foods are specific triggers for flares in only a small percentage of children  If you notice that the skin flares after certain foods you can see if eliminating one food at time makes a difference, as long as your child can still enjoy a well-balanced diet  4) WHAT DOES THE ANTIHISTAMINE DO? Antihistamines help you not to scratch  Scratching only makes the skin more reactive and the barrier function even more disrupted  It can cause both children and their parents to lose sleep! There are different types of anti-itch medications  Some cause more drowsiness than others  Both types are acceptable depending on your child and your preference    Start with Benadryl and if that does not work, ask for a prescription antihistamine      5) ARE THERE ANY SIDE EFFECTS TO WORRY ABOUT? Corticosteroid creams and ointments (generally things with "-one" or "spencer" on the end of their names): The strength of the cream or ointment depends on the name of the active ingredient  The numbers at the end do not indicate the relative strength  Thus triamcinolone 0 1% ointment, considered a mid-strength corticosteroid, is much stronger than hydrocortisone 1% even though the number following the name is much lower  Topical corticosteroids are very effective in treating atopic dermatitis  When used in the manner prescribed (to rashy areas of skin and for no more than a few weeks at a time to any one area) they are very safe  These are corticosteroids and are anti-inflammatory, not the anabolic steroids like those used illegally by some athletes  It is important that you do not overuse steroid creams, and if you notice a thin, shiny appearance to the skin or broken blood vessels, you should stop using the cream and consult your health care provider regarding possible overuse/overthinning of the skin  The face, armpits and groin have particularly thin and sensitive skin and are therefore most at risk for bad results if steroids are over-used in these sites  Topical non-steroid creams and ointments (immunomodulators): These creams and ointments are also called topical calcineurin inhibitors (TCIs)  These include Protopic ointment and Elidel cream  Crisaborole 2% Lendia Amis) is a prescription ointment that targets an enzyme called PDE4 (phosphodiesterase 4)  It is used on the skin topically to treat mild-to-moderate eczema in adults and children 3years of age and older  In total, these nonsteroidal prescriptions are used to help decrease itching and redness in the skin  They are not as strong as most steroid creams; however, it is believed that they do not thin the skin when overused    They are generally used as second-line medications, though they may be used alone or in conjunction with topical steroids  In sensitive areas such as the face, underarms or groin, they are often recommended  They can sting inflamed skin, but are generally well tolerated once the skin is healing  The FDA placed a black-box warning on both Elidel and Protopic in 2006 based on animal studies using the medications  Some animals developed skin cancer and lymphoma  Subsequently, the FDA released a statement that there is no causal relationship between the two medications and cancer  Because of this concern, there are ongoing studies to evaluate this relationship in humans  So far, there are studies that support the safety of these medications  One showed that the rates of cancer in patient using these medications topically were less than the rates of the general population and another showed that in patient's using the medication over a large area of the body, the levels of the medication in the blood was undetectable  As for Eucrisa, this product is only approved for the topical treatment of mild-to-moderate eczema in patients 3years of age and older; use of the medication in kids younger than 2 is considered off label and has not been formally studied  Burning and stinging are the most commonly reported side effects of this medication  Rarely, this product has been known to cause hives and hypersensitivity reactions; discontinue its use if you develop severe itching, swelling, or redness in the area of application  LENTIGO    Physical Exam:   Anatomic Location Affected:  Trunk, upper and lower extremities    Morphological Description:  Several tan brown macules    Pertinent Positives:   Pertinent Negatives:     Additional History of Present Condition:  Patient is here for full skin exam     Assessment and Plan:  Based on a thorough discussion of this condition and the management approach to it (including a comprehensive discussion of the known risks, side effects and potential benefits of treatment), the patient (family) agrees to implement the following specific plan:   Monitor for changes   When outside we recommend using a wide brim hat, sunglasses, long sleeve and pants, sunscreen with SPF 75+ with reapplication every 2 hours, or SPF specific clothing     What is a lentigo? A lentigo is a pigmented flat or slightly raised lesion with a clearly defined edge  Unlike an ephelis (freckle), it does not fade in the winter months  There are several kinds of lentigo  The name lentigo originally referred to its appearance resembling a small lentil  The plural of lentigo is lentigines, although lentigos is also in common use  Who gets lentigines? Lentigines can affect males and females of all ages and races  Solar lentigines are especially prevalent in fair skinned adults  Lentigines associated with syndromes are present at birth or arise during childhood  What causes lentigines? Common forms of lentigo are due to exposure to ultraviolet radiation:   Sun damage including sunburn    Indoor tanning    Phototherapy, especially photochemotherapy (PUVA)    Ionizing radiation, eg radiation therapy, can also cause lentigines  Several familial syndromes associated with widespread lentigines originate from mutations in Joe-MAP kinase, mTOR signaling and PTEN pathways  What are the clinical features of lentigines? Lentigines have been classified into several different types depending on what they look like, where they appear on the body, causative factors, and whether they are associated to other diseases or conditions  Lentigines may be solitary or more often, multiple  Most lentigines are smaller than 5 mm in diameter      Lentigo simplex   A precursor to junctional naevus    Arises during childhood and early adult life    Found on trunk and limbs    Small brown round or oval macule or thin plaque    Jagged or smooth edge    May have a dry surface    May disappear in time  Solar lentigo   A precursor to seborrhoeic keratosis    Found on chronically sun exposed sites such as hands, face, lower legs    May also follow sunburn to shoulders    Yellow, light or dark brown regular or irregular macule or thin plaque    May have a dry surface    Often has moth-eaten outline    Can slowly enlarge to several centimeters in diameter    May disappear, often through the process known as lichenoid keratosis    When atypical in appearance, may be difficult to distinguish from melanoma in situ  Ink spot lentigo   Also known as reticulated lentigo    Few in number compared to solar lentigines    Follows sunburn in very fair skinned individuals    Dark brown to black irregular ink spot-like macule  PUVA lentigo   Similar to ink spot lentigo but follows photochemotherapy (PUVA)    Location anywhere exposed to PUVA  Tanning bed lentigo   Similar to ink spot lentigo but follows indoor tanning    Location anywhere exposed to tanning bed  Radiation lentigo   Occurs in site of irradiation (accidental or therapeutic)    Associated with late-stage radiation dermatitis: epidermal atrophy, subcutaneous fibrosis, keratosis, telangiectasias  Melanotic macule   Mucosal surfaces or adjacent glabrous skin eg lip, vulva, penis, anus    Light to dark brown    Also called mucosal melanosis  Generalised lentigines   Found on any exposed or covered site from early childhood    Small macules may merge to form larger patches    Not associated with a syndrome    Also called lentigines profusa, multiple lentigines  Agminated lentigines   Naevoid eruption of lentigos confined to a single segmental area    Sharp demarcation in midline    May have associated neurological and developmental abnormalities  Patterned lentigines   Inherited tendency to lentigines on face, lips, buttocks, palms, soles    Recognised mainly in people of  ethnicity  Centrofacial neurodysraphic lentiginosis  Associated with mental retardation  Lentiginosis syndromes   Syndromes include LEOPARD/Harrah, Peutz-Jeghers, Laugier-Hunziker, Moynahan, Xeroderma pigmentosum, myxoma syndromes (PRIEST, NAME, Meyers), Ruvalcaba-Myhre-Colon, Bannayan-Zonnana syndrome, Cowden disease (multiple hamartoma syndrome )    Inheritance is autosomal dominant; sporadic cases common    Widespread lentigines present at birth or arise in early childhood    Associated with neural, endocrine, and mesenchymal tumors    How is the diagnosis made? Lentigines are usually diagnosed clinically by their typical appearance  Concern regarding possibility of melanoma may lead to:   Dermatoscopy    Diagnostic excision biopsy    Histopathology of a lentigo shows:   Thickened epidermis    An increased number of melanocytes along the basal layer of epidermis    Unlike junctional melanocytic naevus, there are no nests of melanocytes    Increased melanin pigment within the keratinocytes    Additional features depending on type of lentigo    In contrast, an ephelis (freckle) shows sun-induced increased melanin within the keratinocytes, without an increase in number of cells  What is the treatment for lentigines? Most lentigines are left alone  Attempts to lighten them may not be successful  The following approaches are used:   SPF 50+ broad-spectrum sunscreen    Hydroquinone bleaching cream    Alpha hydroxy acids    Vitamin C    Retinoids    Azelaic acid    Chemical peels  Individual lesions can be permanently removed using:   Cryotherapy    Intense pulsed light    Pigment lasers    How can lentigines be prevented? Lentigines associated with exposure ultraviolet radiation can be prevented by very careful sun protection  Clothing is more successful at preventing new lentigines than are sunscreens  What is the outlook for lentigines?   Lentigines usually persist  They may increase in number with age and sun exposure  Some in sun-protected sites may fade and disappear  MELANOCYTIC NEVI ("Moles")    Physical Exam:   Anatomic Location Affected:   Mostly on sun-exposed areas of the trunk, upper and lower extremities    Morphological Description:  Scattered, 1-4mm round to ovoid, symmetrical-appearing, even bordered, skin colored to dark brown macules/papules, mostly in sun-exposed areas   Pertinent Positives:   Pertinent Negatives: Additional History of Present Condition:  Patient is here for full skin exam     Assessment and Plan:  Based on a thorough discussion of this condition and the management approach to it (including a comprehensive discussion of the known risks, side effects and potential benefits of treatment), the patient (family) agrees to implement the following specific plan:   Monitor for changes   When outside we recommend using a wide brim hat, sunglasses, long sleeve and pants, sunscreen with SPF 34+ with reapplication every 2 hours, or SPF specific clothing    Routine skin exam recommended every 2 years      Melanocytic Nevi  Melanocytic nevi ("moles") are caused by collections of the color producing skin cells, or melanocytes, in 1 area in the skin  They can range in color from pink to dark brown and be either raised or flat  Some moles are present at birth (I e , "congenital nevi"), while others come up later in life (i e , "acquired nevi")  [place] exposure also stimulates the body to make more moles, ie the more sun you get the more moles you'll grow  Clinically distinguishing a healthy mole from melanoma may be difficult  The "ABCDE's" of moles have been suggested as a means of helping to alert a person to a suspicious mole and the possible increased risk of melanoma  Asymmetry: Healthy moles tend to be symmetric, while melanomas are often asymmetric    Asymmetry means if you draw a line through the mole, the two halves do not match in color, size, shape, or surface texture Any mole that starts to demonstrate "asymmetry" should be examined promptly by a board certified dermatologist      Border: Healthy moles tend to have discrete, even borders  The border of a melanoma often blends into the normal skin and does not sharply delineate the mole from normal skin  Any mole that starts to demonstrate "uneven borders" should be examined promptly     Color: Healthy moles tend to be one color throughout  Melanomas tend to be made up of different colors ranging from dark black, blue, white, or red  Any mole that demonstrates a color change should be examined promptly    Diameter: Healthy moles tend to be smaller than 0 6 cm in size; an exception are "congenital nevi" that can be larger  Melanomas tend to grow and can often be greater than 0 6 cm (1/4 of an inch, or the size of a pencil eraser)  This is only a guideline, and many normal moles may be larger than 0 6 cm without being unhealthy  Any mole that starts to change in size (small to bigger or bigger to smaller) should be examined promptly    Evolving: Healthy moles tend to "stay the same "  Melanomas may often show signs of change or evolution such as a change in size, shape, color, or elevation  Any mole that starts to itch, bleed, crust, burn, hurt, or ulcerate or demonstrate a change or evolution should be examined promptly by a board certified dermatologist       What are atypical moles or dysplastic nevi? Dysplastic moles are moles that have some of the ABCDE  changes listed above but  are not cancerous  Sometimes a biopsy and microscopic examination are needed to determine the difference  They may indicate an increased risk of melanoma in that person, especially if there is a family history of melanoma  What is a Melanoma? The main concern when looking at a new or changing mole it to evaluate whether it may be a melanoma   The appearance of a "new mole" remains one of the most reliable methods for identifying a malignant melanoma  A melanoma is a type of skin cancer that can be deadly if it spreads throughout the body  The prognosis of a Melanoma depends on how deep it has penetrated in the skin  If caught early, they generally will not have had time to grow into the deeper layers of the skin and they cure rate is then very high  Once the melanoma grows deeper into the skin, the cure rate drops dramatically  Therefore, early detection and removal of a malignant melanoma results in a much better chance of complete cure  STOVER ANGIOMAS    Physical Exam:   Anatomic Location Affected:  Trunk, extremities    Morphological Description:  Scattered cherry red, 1-4 mm papules   Pertinent Positives:   Pertinent Negatives: Additional History of Present Condition:  Patient is here for full skin exam     Assessment and Plan:  Based on a thorough discussion of this condition and the management approach to it (including a comprehensive discussion of the known risks, side effects and potential benefits of treatment), the patient (family) agrees to implement the following specific plan:   Reassured benign     Assessment and Plan:    Cherry angioma, also known as [employer] spots, are benign vascular skin lesions  A "cherry angioma" is a firm red, blue or purple papule, 0 1-1 cm in diameter  When thrombosed, they can appear black in colour until evaluated with a dermatoscope when the red or purple colour is more easily seen  Cherry angioma may develop on any part of the body but most often appear on the scalp, face, lips and trunk  An angioma is due to proliferating endothelial cells; these are the cells that line the inside of a blood vessel  Angiomas can arise in early life or later in life; the most common type of angioma is a cherry angioma  Cherry angiomas are very common in males and females of any age or race  They are more noticeable in white skin than in skin of colour   They markedly increase in number from about the age of 36  There may be a family history of similar lesions  Eruptive cherry angiomas have been rarely reported to be associated with internal malignancy  The cause of angiomas is unknown  Genetic analysis of cherry angiomas has shown that they frequently carry specific somatic missense mutations in the GNAQ and GNA11 (Q209H) genes, which are involved in other vascular and melanocytic proliferations  Cherry angioma is usually diagnosed clinically and no investigations are necessary for the majority of lesions  It has a characteristic red-clod or lobular pattern on dermatoscopy (called lacunar pattern using conventional pattern analysis)  When there is uncertainty about the diagnosis, a biopsy may be performed  The angioma is composed of venules in a thickened papillary dermis  Collagen bundles may be prominent between the lobules  Cherry angiomas are harmless, so they do not usually have to be treated  Occasionally, they are removed to exclude a malignant skin lesion such as a nodular melanoma or because they are irritated or bleeding (and a subsequent risk for infection)  To decrease friction over the lesions, we recommend Neutrogena Daily Defense SPF 50+ at least 3 times a day      Scribe Attestation    I,:  Mary Beth Chua am acting as a scribe while in the presence of the attending physician :       I,:  Celeste Collazo MD personally performed the services described in this documentation    as scribed in my presence :

## 2022-04-18 ENCOUNTER — TELEPHONE (OUTPATIENT)
Dept: DERMATOLOGY | Facility: CLINIC | Age: 52
End: 2022-04-18

## 2022-04-18 NOTE — TELEPHONE ENCOUNTER
Pt called stating that at the time of her last apt (3/28) she was informed there would be refills put on her dupixent, but when she went to the pharmacy to obtain the refills they stated there were none on file  Pt is asking if she can please get the dupixent refilled as soon as possible

## 2022-04-19 DIAGNOSIS — L20.9 ATOPIC DERMATITIS, UNSPECIFIED TYPE: Primary | ICD-10-CM

## 2022-04-19 RX ORDER — DUPILUMAB 300 MG/2ML
300 INJECTION, SOLUTION SUBCUTANEOUS
Qty: 6 ML | Refills: 4 | Status: SHIPPED | OUTPATIENT
Start: 2022-04-19 | End: 2022-05-20 | Stop reason: SDUPTHER

## 2022-05-02 ENCOUNTER — TELEPHONE (OUTPATIENT)
Dept: FAMILY MEDICINE CLINIC | Facility: CLINIC | Age: 52
End: 2022-05-02

## 2022-05-02 NOTE — TELEPHONE ENCOUNTER
Spoke with the patient and she said that she would prefer the Ozempic as it has the same medication as the wygovy  She contacted the company and they are not doing her low dose  If we do not get the prior auth to try for the Dea Simpler was denied  Ernst Hurtado will place order or dave  Will inform patient   Called and informed patient

## 2022-05-12 ENCOUNTER — DOCUMENTATION (OUTPATIENT)
Dept: FAMILY MEDICINE CLINIC | Facility: CLINIC | Age: 52
End: 2022-05-12

## 2022-05-12 NOTE — PROGRESS NOTES
Received Prior Authorization request for the [employer] injectors  Key [id number]  Sent to plan    Received forms and faxed back      Called for the status of the prior authorization and spoke with [facility]  It is still in progress and I explained that the patient has been waiting and would probably need an answer soon  She is without medication at this time and it may jeopardize her therapy  He put in a rush and I would fine out around 5/27/22   Will send a message to the patient

## 2022-05-17 DIAGNOSIS — L20.9 ATOPIC DERMATITIS, UNSPECIFIED TYPE: ICD-10-CM

## 2022-05-20 RX ORDER — DUPILUMAB 300 MG/2ML
300 INJECTION, SOLUTION SUBCUTANEOUS
Qty: 6 ML | Refills: 4 | Status: SHIPPED | OUTPATIENT
Start: 2022-05-20

## 2022-05-24 ENCOUNTER — PATIENT MESSAGE (OUTPATIENT)
Dept: FAMILY MEDICINE CLINIC | Facility: CLINIC | Age: 52
End: 2022-05-24

## 2022-05-27 ENCOUNTER — OFFICE VISIT (OUTPATIENT)
Dept: FAMILY MEDICINE CLINIC | Facility: CLINIC | Age: 52
End: 2022-05-27
Payer: COMMERCIAL

## 2022-05-27 DIAGNOSIS — F41.9 ANXIETY: ICD-10-CM

## 2022-05-27 DIAGNOSIS — A60.09 HERPES GENITALIS IN WOMEN: ICD-10-CM

## 2022-05-27 DIAGNOSIS — G43.809 OTHER MIGRAINE WITHOUT STATUS MIGRAINOSUS, NOT INTRACTABLE: ICD-10-CM

## 2022-05-27 DIAGNOSIS — F32.A DEPRESSION, UNSPECIFIED DEPRESSION TYPE: ICD-10-CM

## 2022-05-27 PROCEDURE — 99214 OFFICE O/P EST MOD 30 MIN: CPT | Performed by: NURSE PRACTITIONER

## 2022-05-27 RX ORDER — CITALOPRAM 40 MG/1
40 TABLET ORAL DAILY
Qty: 90 TABLET | Refills: 3 | Status: SHIPPED | OUTPATIENT
Start: 2022-05-27

## 2022-05-27 RX ORDER — TOPIRAMATE 100 MG/1
100 TABLET, FILM COATED ORAL DAILY
Qty: 90 TABLET | Refills: 3 | Status: SHIPPED | OUTPATIENT
Start: 2022-05-27

## 2022-05-27 RX ORDER — ALPRAZOLAM 0.25 MG/1
0.25 TABLET ORAL 3 TIMES DAILY PRN
Qty: 30 TABLET | Refills: 0 | Status: SHIPPED | OUTPATIENT
Start: 2022-05-27

## 2022-05-27 RX ORDER — VALACYCLOVIR HYDROCHLORIDE 500 MG/1
500 TABLET, FILM COATED ORAL 2 TIMES DAILY
Qty: 180 TABLET | Refills: 3 | Status: SHIPPED | OUTPATIENT
Start: 2022-05-27 | End: 2022-08-25

## 2022-05-27 NOTE — PROGRESS NOTES
FAMILY PRACTICE OFFICE VISIT       NAME: Arcelia Ulloa  AGE: 45 y o  SEX: female       : 1970        MRN: [id number]    DATE: 2022  TIME: 4:41 PM    Assessment and Plan   1  Other migraine without status migrainosus, not intractable  -     topiramate (TOPAMAX) 100 mg tablet; Take 1 tablet (100 mg total) by mouth daily  -     TSH, 3rd generation with Free T4 reflex; Future  -     Comprehensive metabolic panel; Future  -     CBC and differential; Future    2  Herpes genitalis in women  -     valACYclovir (VALTREX) 500 mg tablet; Take 1 tablet (500 mg total) by mouth 2 (two) times a day  -     TSH, 3rd generation with Free T4 reflex; Future  -     Comprehensive metabolic panel; Future  -     CBC and differential; Future    3  Depression, unspecified depression type  -     citalopram (CeleXA) 40 mg tablet; Take 1 tablet (40 mg total) by mouth daily  -     TSH, 3rd generation with Free T4 reflex; Future  -     Comprehensive metabolic panel; Future  -     CBC and differential; Future    4  Anxiety  -     ALPRAZolam (XANAX) 0 25 mg tablet; Take 1 tablet (0 25 mg total) by mouth 3 (three) times a day as needed for anxiety  -     TSH, 3rd generation with Free T4 reflex; Future  -     Comprehensive metabolic panel; Future  -     CBC and differential; Future               Chief Complaint     Chief Complaint   Patient presents with    Follow-up     F/u to obesity, anxiety, hsv       History of Present Illness   Arcelia Ulloa is a 45y o -year-old female who is here for f/u  When was on wegovy, did very well losing weight, now on backorder  Had to switch to saxenda, not as effective but will continue for now    Needs refills of all her meds      Review of Systems   Review of Systems   Constitutional: Negative for fatigue and fever  HENT: Negative for congestion, postnasal drip and rhinorrhea  Eyes: Negative for photophobia and visual disturbance     Respiratory: Negative for cough, shortness of breath and wheezing  Cardiovascular: Negative for chest pain and palpitations  Gastrointestinal: Negative for constipation, diarrhea, nausea and vomiting  Endocrine: Negative for polydipsia, polyphagia and polyuria  Genitourinary: Negative for frequency and hematuria  Musculoskeletal: Negative for arthralgias and myalgias  Skin: Negative for rash  Neurological: Negative for dizziness, light-headedness and headaches  Hematological: Negative for adenopathy  Psychiatric/Behavioral: Negative for dysphoric mood and sleep disturbance  The patient is not nervous/anxious          Active Problem List     Patient Active Problem List   Diagnosis    Anxiety    Dizziness    Hyperreflexia    Migraine    Depression    Meningioma (HCC)    Mass of skull    Constipation by delayed colonic transit    Allergic rhinitis due to pollen    Nephrolithiasis    Vitamin D deficiency    Family history of colon cancer    Acute bilateral low back pain with bilateral sciatica    Class 1 obesity due to excess calories without serious comorbidity with body mass index (BMI) of 30 0 to 30 9 in adult    Herpes genitalis in women         Past Medical History:  Past Medical History:   Diagnosis Date    Anxiety     Asthma     Last assessed 9/25/2014    Colon polyp     Constipation     Eczema     Irritable bowel syndrome (IBS)     Seasonal allergies     Lusk teeth extracted        Past Surgical History:  Past Surgical History:   Procedure Laterality Date    COLONOSCOPY      HYSTERECTOMY  2015    TONSILLECTOMY AND ADENOIDECTOMY      WISDOM TOOTH EXTRACTION         Family History:  Family History   Problem Relation Age of Onset    Hypertension Mother     Colon cancer Father 50    No Known Problems Sister     No Known Problems Daughter     No Known Problems Maternal Grandmother     No Known Problems Maternal Grandfather     No Known Problems Paternal Grandmother     No Known Problems Paternal Grandfather     No Known Problems Maternal Aunt        Social History:  Social History     Socioeconomic History    Marital status: /Civil Union     Spouse name: Not on file    Number of children: Not on file    Years of education: Not on file    Highest education level: Not on file   Occupational History    Not on file   Tobacco Use    Smoking status: Former Smoker     Packs/day: 0 25     Years: 10 00     Pack years: 2 50     Types: Cigarettes     Start date:      Quit date: 1993     Years since quittin 8    Smokeless tobacco: Never Used   Vaping Use    Vaping Use: Never used   Substance and Sexual Activity    Alcohol use: Yes     Alcohol/week: 3 0 standard drinks     Types: 3 Glasses of wine per week    Drug use: Never    Sexual activity: Yes     Partners: Male   Other Topics Concern    Not on file   Social History Narrative    Not on file     Social Determinants of Health     Financial Resource Strain: Not on file   Food Insecurity: Not on file   Transportation Needs: Not on file   Physical Activity: Not on file   Stress: Not on file   Social Connections: Not on file   Intimate Partner Violence: Not on file   Housing Stability: Not on file       Objective     Vitals:    22 1431   BP: 122/82   Pulse: 65   Resp: 16   Temp: (!) 97 2 °F (36 2 °C)   SpO2: 97%     Wt Readings from Last 3 Encounters:   22 74 8 kg (164 lb 12 8 oz)   22 81 2 kg (179 lb)   22 81 kg (178 lb 9 6 oz)       Physical Exam  Vitals and nursing note reviewed  Constitutional:       Appearance: Normal appearance  HENT:      Head: Normocephalic and atraumatic  Right Ear: Tympanic membrane, ear canal and external ear normal       Left Ear: Tympanic membrane, ear canal and external ear normal       Nose: Nose normal       Mouth/Throat:      Mouth: Mucous membranes are moist       Pharynx: Oropharynx is clear     Eyes:      Conjunctiva/sclera: Conjunctivae normal    Cardiovascular:      Rate and Rhythm: Normal rate and regular rhythm  Heart sounds: Normal heart sounds  Pulmonary:      Effort: Pulmonary effort is normal       Breath sounds: Normal breath sounds  Abdominal:      General: Bowel sounds are normal       Palpations: Abdomen is soft  Musculoskeletal:         General: Normal range of motion  Cervical back: Normal range of motion and neck supple  Skin:     General: Skin is warm and dry  Capillary Refill: Capillary refill takes less than 2 seconds  Neurological:      Mental Status: She is alert and oriented to person, place, and time  Psychiatric:         Mood and Affect: Mood normal          Behavior: Behavior normal          Thought Content: Thought content normal          Judgment: Judgment normal          Pertinent Laboratory/Diagnostic Studies:  Lab Results   Component Value Date    GLUCOSE 98 04/25/2014    BUN 21 05/29/2022    CREATININE 1 02 05/29/2022    CALCIUM 9 7 05/29/2022     04/25/2014    K 3 7 05/29/2022    CO2 26 05/29/2022     05/29/2022     Lab Results   Component Value Date    ALT 17 05/29/2022    AST 21 05/29/2022    ALKPHOS 39 (L) 05/29/2022    BILITOT 0 50 04/25/2014       Lab Results   Component Value Date    WBC 6 07 05/29/2022    HGB 14 5 05/29/2022    HCT 43 6 05/29/2022    MCV 99 (H) 05/29/2022     05/29/2022       No results found for: TSH    Lab Results   Component Value Date    CHOL 232 06/26/2015     Lab Results   Component Value Date    TRIG 44 05/29/2022     Lab Results   Component Value Date    HDL 89 05/29/2022     Lab Results   Component Value Date    LDLCALC 167 (H) 05/29/2022     Lab Results   Component Value Date    HGBA1C 5 6 05/29/2022       Results for orders placed or performed in visit on 08/02/21   CBC and differential   Result Value Ref Range    WBC 7 20 4  31 - 10 16 Thousand/uL    RBC 4 20 3 81 - 5 12 Million/uL    Hemoglobin 13 7 11 5 - 15 4 g/dL    Hematocrit 42 9 34 8 - 46 1 %     (H) 82 - 98 fL    MCH 32 6 26 8 - 34 3 pg    MCHC 31 9 31 4 - 37 4 g/dL    RDW 12 4 11 6 - 15 1 %    MPV 11 3 8 9 - 12 7 fL    Platelets 009 851 - 195 Thousands/uL    nRBC 0 /100 WBCs    Neutrophils Relative 59 43 - 75 %    Immat GRANS % 0 0 - 2 %    Lymphocytes Relative 29 14 - 44 %    Monocytes Relative 9 4 - 12 %    Eosinophils Relative 2 0 - 6 %    Basophils Relative 1 0 - 1 %    Neutrophils Absolute 4 25 1 85 - 7 62 Thousands/µL    Immature Grans Absolute 0 02 0 00 - 0 20 Thousand/uL    Lymphocytes Absolute 2 06 0 60 - 4 47 Thousands/µL    Monocytes Absolute 0 63 0 17 - 1 22 Thousand/µL    Eosinophils Absolute 0 17 0 00 - 0 61 Thousand/µL    Basophils Absolute 0 07 0 00 - 0 10 Thousands/µL   Hepatic function panel   Result Value Ref Range    Total Bilirubin 0 52 0 20 - 1 00 mg/dL    Bilirubin, Direct 0 10 0 00 - 0 20 mg/dL    Alkaline Phosphatase 46 46 - 116 U/L    AST 18 5 - 45 U/L    ALT 17 12 - 78 U/L    Total Protein 7 6 6 4 - 8 2 g/dL    Albumin 3 6 3 5 - 5 0 g/dL       Orders Placed This Encounter   Procedures    TSH, 3rd generation with Free T4 reflex    Comprehensive metabolic panel    CBC and differential       ALLERGIES:  No Known Allergies    Current Medications     Current Outpatient Medications   Medication Sig Dispense Refill    ALPRAZolam (XANAX) 0 25 mg tablet Take 1 tablet (0 25 mg total) by mouth 3 (three) times a day as needed for anxiety 30 tablet 0    b complex vitamins capsule Take 1 capsule by mouth daily      B-D ALLERGY SYRINGE 1CC/28G 28G X 1/2" 1 ML MISC   2    Cholecalciferol (VITAMIN D3) 2000 units capsule Take 2 capsules by mouth daily        citalopram (CeleXA) 40 mg tablet Take 1 tablet (40 mg total) by mouth daily 90 tablet 3    Dupixent subcutaneous injection Inject 2 mL (300 mg total) under the skin every 14 (fourteen) days 6 mL 4    fluticasone (FLONASE) 50 mcg/act nasal spray 2 sprays into each nostril daily      Insulin Pen Needle 31G X 8 MM MISC Use once a week 50 each 0    liraglutide (SAXENDA) injection Start 0 6mg daily, increase once weekly by 0 6mg to max 3mg daily 15 mL 3    Plecanatide 3 MG TABS Take 3 mg by mouth daily 90 tablet 3    topiramate (TOPAMAX) 100 mg tablet Take 1 tablet (100 mg total) by mouth daily 90 tablet 3    valACYclovir (VALTREX) 500 mg tablet Take 1 tablet (500 mg total) by mouth 2 (two) times a day 180 tablet 3     No current facility-administered medications for this visit           Health Maintenance     Health Maintenance   Topic Date Due    HIV Screening  03/18/2024 (Originally 11/7/1985)    Hepatitis C Screening  04/18/2024 (Originally 1970)    Influenza Vaccine (Season Ended) 09/01/2022    Cervical Cancer Screening  01/02/2023    BMI: Followup Plan  03/18/2023    Annual Physical  03/18/2023    BMI: Adult  05/27/2023    Colorectal Cancer Screening  08/14/2023    Breast Cancer Screening: Mammogram  01/03/2024    DTaP,Tdap,and Td Vaccines (2 - Td or Tdap) 06/18/2031    COVID-19 Vaccine  Completed    Pneumococcal Vaccine: Pediatrics (0 to 5 Years) and At-Risk Patients (6 to 59 Years)  Aged Out    HIB Vaccine  Aged Out    Hepatitis B Vaccine  Aged Out    IPV Vaccine  Aged Out    Hepatitis A Vaccine  Aged Out    Meningococcal ACWY Vaccine  Aged Out    HPV Vaccine  Aged [employer] History   Administered Date(s) Administered    COVID-19 PFIZER VACCINE 0 3 ML IM 12/23/2020, 01/12/2021, 08/21/2021, 02/05/2022    H1N1, All Formulations 11/02/2009    INFLUENZA 10/24/2019, 11/08/2020    Influenza, seasonal, injectable 10/15/2013    Influenza, seasonal, injectable, preservative free 10/24/2019    Tdap 06/18/2021          ANIVAL Jimenez

## 2022-05-29 ENCOUNTER — APPOINTMENT (OUTPATIENT)
Dept: LAB | Facility: HOSPITAL | Age: 52
End: 2022-05-29

## 2022-05-29 ENCOUNTER — APPOINTMENT (OUTPATIENT)
Dept: LAB | Facility: HOSPITAL | Age: 52
End: 2022-05-29
Payer: COMMERCIAL

## 2022-05-29 VITALS
HEART RATE: 65 BPM | DIASTOLIC BLOOD PRESSURE: 82 MMHG | BODY MASS INDEX: 27.42 KG/M2 | WEIGHT: 164.8 LBS | RESPIRATION RATE: 16 BRPM | TEMPERATURE: 97.2 F | OXYGEN SATURATION: 97 % | SYSTOLIC BLOOD PRESSURE: 122 MMHG

## 2022-05-29 DIAGNOSIS — Z00.8 HEALTH EXAMINATION IN POPULATION SURVEYS: ICD-10-CM

## 2022-05-29 DIAGNOSIS — F32.A DEPRESSION, UNSPECIFIED DEPRESSION TYPE: ICD-10-CM

## 2022-05-29 DIAGNOSIS — F41.9 ANXIETY: ICD-10-CM

## 2022-05-29 DIAGNOSIS — E66.09 CLASS 1 OBESITY DUE TO EXCESS CALORIES WITHOUT SERIOUS COMORBIDITY WITH BODY MASS INDEX (BMI) OF 30.0 TO 30.9 IN ADULT: ICD-10-CM

## 2022-05-29 DIAGNOSIS — G43.809 OTHER MIGRAINE WITHOUT STATUS MIGRAINOSUS, NOT INTRACTABLE: ICD-10-CM

## 2022-05-29 DIAGNOSIS — A60.09 HERPES GENITALIS IN WOMEN: ICD-10-CM

## 2022-05-29 LAB
ALBUMIN SERPL BCP-MCNC: 4.1 G/DL (ref 3.5–5)
ALP SERPL-CCNC: 39 U/L (ref 46–116)
ALT SERPL W P-5'-P-CCNC: 17 U/L (ref 12–78)
ANION GAP SERPL CALCULATED.3IONS-SCNC: 3 MMOL/L (ref 4–13)
AST SERPL W P-5'-P-CCNC: 21 U/L (ref 5–45)
BASOPHILS # BLD AUTO: 0.05 THOUSANDS/ΜL (ref 0–0.1)
BASOPHILS NFR BLD AUTO: 1 % (ref 0–1)
BILIRUB SERPL-MCNC: 0.87 MG/DL (ref 0.2–1)
BUN SERPL-MCNC: 21 MG/DL (ref 5–25)
CALCIUM SERPL-MCNC: 9.7 MG/DL (ref 8.3–10.1)
CHLORIDE SERPL-SCNC: 108 MMOL/L (ref 100–108)
CHOLEST SERPL-MCNC: 265 MG/DL
CO2 SERPL-SCNC: 26 MMOL/L (ref 21–32)
CREAT SERPL-MCNC: 1.02 MG/DL (ref 0.6–1.3)
EOSINOPHIL # BLD AUTO: 0.13 THOUSAND/ΜL (ref 0–0.61)
EOSINOPHIL NFR BLD AUTO: 2 % (ref 0–6)
ERYTHROCYTE [DISTWIDTH] IN BLOOD BY AUTOMATED COUNT: 12.4 % (ref 11.6–15.1)
EST. AVERAGE GLUCOSE BLD GHB EST-MCNC: 114 MG/DL
GFR SERPL CREATININE-BSD FRML MDRD: 63 ML/MIN/1.73SQ M
GLUCOSE P FAST SERPL-MCNC: 78 MG/DL (ref 65–99)
HBA1C MFR BLD: 5.6 %
HCT VFR BLD AUTO: 43.6 % (ref 34.8–46.1)
HDLC SERPL-MCNC: 89 MG/DL
HGB BLD-MCNC: 14.5 G/DL (ref 11.5–15.4)
IMM GRANULOCYTES # BLD AUTO: 0.02 THOUSAND/UL (ref 0–0.2)
IMM GRANULOCYTES NFR BLD AUTO: 0 % (ref 0–2)
LDLC SERPL CALC-MCNC: 167 MG/DL (ref 0–100)
LYMPHOCYTES # BLD AUTO: 1.83 THOUSANDS/ΜL (ref 0.6–4.47)
LYMPHOCYTES NFR BLD AUTO: 30 % (ref 14–44)
MCH RBC QN AUTO: 32.9 PG (ref 26.8–34.3)
MCHC RBC AUTO-ENTMCNC: 33.3 G/DL (ref 31.4–37.4)
MCV RBC AUTO: 99 FL (ref 82–98)
MONOCYTES # BLD AUTO: 0.57 THOUSAND/ΜL (ref 0.17–1.22)
MONOCYTES NFR BLD AUTO: 9 % (ref 4–12)
NEUTROPHILS # BLD AUTO: 3.47 THOUSANDS/ΜL (ref 1.85–7.62)
NEUTS SEG NFR BLD AUTO: 58 % (ref 43–75)
NRBC BLD AUTO-RTO: 0 /100 WBCS
PLATELET # BLD AUTO: 246 THOUSANDS/UL (ref 149–390)
PMV BLD AUTO: 10.8 FL (ref 8.9–12.7)
POTASSIUM SERPL-SCNC: 3.7 MMOL/L (ref 3.5–5.3)
PROT SERPL-MCNC: 7.7 G/DL (ref 6.4–8.2)
RBC # BLD AUTO: 4.41 MILLION/UL (ref 3.81–5.12)
SODIUM SERPL-SCNC: 137 MMOL/L (ref 136–145)
TRIGL SERPL-MCNC: 44 MG/DL
TSH SERPL DL<=0.05 MIU/L-ACNC: 1.51 UIU/ML (ref 0.45–4.5)
WBC # BLD AUTO: 6.07 THOUSAND/UL (ref 4.31–10.16)

## 2022-05-29 PROCEDURE — 84443 ASSAY THYROID STIM HORMONE: CPT

## 2022-05-29 PROCEDURE — 85025 COMPLETE CBC W/AUTO DIFF WBC: CPT

## 2022-05-29 PROCEDURE — 83036 HEMOGLOBIN GLYCOSYLATED A1C: CPT

## 2022-05-29 PROCEDURE — 36415 COLL VENOUS BLD VENIPUNCTURE: CPT

## 2022-05-29 PROCEDURE — 80053 COMPREHEN METABOLIC PANEL: CPT

## 2022-05-29 PROCEDURE — 80061 LIPID PANEL: CPT

## 2022-08-12 ENCOUNTER — OFFICE VISIT (OUTPATIENT)
Dept: FAMILY MEDICINE CLINIC | Facility: CLINIC | Age: 52
End: 2022-08-12
Payer: COMMERCIAL

## 2022-08-12 VITALS
HEIGHT: 65 IN | RESPIRATION RATE: 16 BRPM | SYSTOLIC BLOOD PRESSURE: 118 MMHG | HEART RATE: 82 BPM | OXYGEN SATURATION: 97 % | BODY MASS INDEX: 25.86 KG/M2 | WEIGHT: 155.2 LBS | DIASTOLIC BLOOD PRESSURE: 78 MMHG | TEMPERATURE: 97.2 F

## 2022-08-12 DIAGNOSIS — E66.09 CLASS 1 OBESITY DUE TO EXCESS CALORIES WITHOUT SERIOUS COMORBIDITY WITH BODY MASS INDEX (BMI) OF 30.0 TO 30.9 IN ADULT: ICD-10-CM

## 2022-08-12 PROCEDURE — 99213 OFFICE O/P EST LOW 20 MIN: CPT | Performed by: NURSE PRACTITIONER

## 2022-08-12 NOTE — PROGRESS NOTES
FAMILY PRACTICE OFFICE VISIT       NAME: [facility]  AGE: 45 y o  SEX: female       : 1970        MRN: [id number]    DATE: 2022  TIME: 3:26 PM    Assessment and Plan   1  Class 1 obesity due to excess calories without serious comorbidity with body mass index (BMI) of 30 0 to 30 9 in adult  -     Insulin Pen Needle 31G X 8 MM MISC; Use once a week  -     liraglutide (SAXENDA) injection; Start 0 6mg daily, increase once weekly by 0 6mg to max 3mg daily       There are no Patient Instructions on file for this visit  Chief Complaint     Chief Complaint   Patient presents with    Follow-up       History of Present Illness   [facility] is a 45y o -year-old female who is here for f/u to chronic medical conditions  Lost 27 pounds      Review of Systems   Review of Systems   Constitutional: Negative for fatigue and fever  HENT: Negative for congestion and postnasal drip  Eyes: Negative for photophobia and visual disturbance  Respiratory: Negative for cough, shortness of breath and wheezing  Cardiovascular: Negative for chest pain and palpitations  Gastrointestinal: Negative for constipation, diarrhea, nausea and vomiting  Genitourinary: Negative for dysuria and frequency  Musculoskeletal: Negative for arthralgias and myalgias  Skin: Negative for rash  Neurological: Negative for dizziness, light-headedness and headaches  Hematological: Negative for adenopathy  Psychiatric/Behavioral: Negative for dysphoric mood and sleep disturbance  The patient is not nervous/anxious          Active Problem List     Patient Active Problem List   Diagnosis    Anxiety    Dizziness    Hyperreflexia    Migraine    Depression    Meningioma (HCC)    Mass of skull    Constipation by delayed colonic transit    Allergic rhinitis due to pollen    Nephrolithiasis    Vitamin D deficiency    Family history of colon cancer    Acute bilateral low back pain with bilateral sciatica    Class 1 obesity due to excess calories without serious comorbidity with body mass index (BMI) of 30 0 to 30 9 in adult    Herpes genitalis in women         Past Medical History:  Past Medical History:   Diagnosis Date    Anxiety     Asthma     Last assessed 2014    Colon polyp     Constipation     Eczema     Irritable bowel syndrome (IBS)     Seasonal allergies     [place] teeth extracted        Past Surgical History:  Past Surgical History:   Procedure Laterality Date    COLONOSCOPY      HYSTERECTOMY  2015    TONSILLECTOMY AND ADENOIDECTOMY      WISDOM TOOTH EXTRACTION         Family History:  Family History   Problem Relation Age of Onset    Hypertension Mother     Colon cancer Father 50    No Known Problems Sister     No Known Problems Daughter     No Known Problems Maternal Grandmother     No Known Problems Maternal Grandfather     No Known Problems Paternal Grandmother     No Known Problems Paternal Grandfather     No Known Problems Maternal Aunt        Social History:  Social History     Socioeconomic History    Marital status: /Civil Union     Spouse name: Not on file    Number of children: Not on file    Years of education: Not on file    Highest education level: Not on file   Occupational History    Not on file   Tobacco Use    Smoking status: Former Smoker     Packs/day: 0 25     Years: 10 00     Pack years: 2 50     Types: Cigarettes     Start date:      Quit date: 1993     Years since quittin 0    Smokeless tobacco: Never Used   Vaping Use    Vaping Use: Never used   Substance and Sexual Activity    Alcohol use:  Yes     Alcohol/week: 3 0 standard drinks     Types: 3 Glasses of wine per week    Drug use: Never    Sexual activity: Yes     Partners: Male   Other Topics Concern    Not on file   Social History Narrative    Not on file     Social Determinants of Health     Financial Resource Strain: Not on file   Food Insecurity: Not on file   Transportation Needs: Not on file   Physical Activity: Not on file   Stress: Not on file   Social Connections: Not on file   Intimate Partner Violence: Not on file   Housing Stability: Not on file       Objective     Vitals:    08/12/22 1435   BP: 118/78   Pulse: 82   Resp: 16   Temp: (!) 97 2 °F (36 2 °C)   SpO2: 97%     Wt Readings from Last 3 Encounters:   08/12/22 70 4 kg (155 lb 3 2 oz)   05/27/22 74 8 kg (164 lb 12 8 oz)   03/28/22 81 2 kg (179 lb)       Physical Exam  Vitals and nursing note reviewed  Constitutional:       Appearance: Normal appearance  HENT:      Head: Normocephalic and atraumatic  Eyes:      Conjunctiva/sclera: Conjunctivae normal    Pulmonary:      Effort: Pulmonary effort is normal    Musculoskeletal:         General: Normal range of motion  Cervical back: Normal range of motion  Neurological:      Mental Status: She is alert and oriented to person, place, and time     Psychiatric:         Mood and Affect: Mood normal          Behavior: Behavior normal          Pertinent Laboratory/Diagnostic Studies:  Lab Results   Component Value Date    GLUCOSE 98 04/25/2014    BUN 21 05/29/2022    CREATININE 1 02 05/29/2022    CALCIUM 9 7 05/29/2022     04/25/2014    K 3 7 05/29/2022    CO2 26 05/29/2022     05/29/2022     Lab Results   Component Value Date    ALT 17 05/29/2022    AST 21 05/29/2022    ALKPHOS 39 (L) 05/29/2022    BILITOT 0 50 04/25/2014       Lab Results   Component Value Date    WBC 6 07 05/29/2022    HGB 14 5 05/29/2022    HCT 43 6 05/29/2022    MCV 99 (H) 05/29/2022     05/29/2022       No results found for: TSH    Lab Results   Component Value Date    CHOL 232 06/26/2015     Lab Results   Component Value Date    TRIG 44 05/29/2022     Lab Results   Component Value Date    HDL 89 05/29/2022     Lab Results   Component Value Date    LDLCALC 167 (H) 05/29/2022     Lab Results   Component Value Date    HGBA1C 5 6 05/29/2022       Results for orders placed or performed in visit on 05/29/22   Lipid Panel with Direct LDL reflex   Result Value Ref Range    Cholesterol 265 (H) See Comment mg/dL    Triglycerides 44 See Comment mg/dL    HDL, Direct 89 >=50 mg/dL    LDL Calculated 167 (H) 0 - 100 mg/dL   TSH, 3rd generation with Free T4 reflex   Result Value Ref Range    TSH 3RD GENERATON 1 510 0 450 - 4 500 uIU/mL   Comprehensive metabolic panel   Result Value Ref Range    Sodium 137 136 - 145 mmol/L    Potassium 3 7 3 5 - 5 3 mmol/L    Chloride 108 100 - 108 mmol/L    CO2 26 21 - 32 mmol/L    ANION GAP 3 (L) 4 - 13 mmol/L    BUN 21 5 - 25 mg/dL    Creatinine 1 02 0 60 - 1 30 mg/dL    Glucose, Fasting 78 65 - 99 mg/dL    Calcium 9 7 8 3 - 10 1 mg/dL    AST 21 5 - 45 U/L    ALT 17 12 - 78 U/L    Alkaline Phosphatase 39 (L) 46 - 116 U/L    Total Protein 7 7 6 4 - 8 2 g/dL    Albumin 4 1 3 5 - 5 0 g/dL    Total Bilirubin 0 87 0 20 - 1 00 mg/dL    eGFR 63 ml/min/1 73sq m   CBC and differential   Result Value Ref Range    WBC 6 07 4 31 - 10 16 Thousand/uL    RBC 4 41 3 81 - 5 12 Million/uL    Hemoglobin 14 5 11 5 - 15 4 g/dL    Hematocrit 43 6 34 8 - 46 1 %    MCV 99 (H) 82 - 98 fL    MCH 32 9 26 8 - 34 3 pg    MCHC 33 3 31 4 - 37 4 g/dL    RDW 12 4 11 6 - 15 1 %    MPV 10 8 8 9 - 12 7 fL    Platelets 855 476 - 662 Thousands/uL    nRBC 0 /100 WBCs    Neutrophils Relative 58 43 - 75 %    Immat GRANS % 0 0 - 2 %    Lymphocytes Relative 30 14 - 44 %    Monocytes Relative 9 4 - 12 %    Eosinophils Relative 2 0 - 6 %    Basophils Relative 1 0 - 1 %    Neutrophils Absolute 3 47 1 85 - 7 62 Thousands/µL    Immature Grans Absolute 0 02 0 00 - 0 20 Thousand/uL    Lymphocytes Absolute 1 83 0 60 - 4 47 Thousands/µL    Monocytes Absolute 0 57 0 17 - 1 22 Thousand/µL    Eosinophils Absolute 0 13 0 00 - 0 61 Thousand/µL    Basophils Absolute 0 05 0 00 - 0 10 Thousands/µL       No orders of the defined types were placed in this encounter        ALLERGIES:  No Known Allergies    Current Medications Current Outpatient Medications   Medication Sig Dispense Refill    ALPRAZolam (XANAX) 0 25 mg tablet Take 1 tablet (0 25 mg total) by mouth 3 (three) times a day as needed for anxiety 30 tablet 0    b complex vitamins capsule Take 1 capsule by mouth daily      B-D ALLERGY SYRINGE 1CC/28G 28G X 1/2" 1 ML MISC   2    Cholecalciferol (VITAMIN D3) 2000 units capsule Take 2 capsules by mouth daily        citalopram (CeleXA) 40 mg tablet Take 1 tablet (40 mg total) by mouth daily 90 tablet 3    Dupixent subcutaneous injection Inject 2 mL (300 mg total) under the skin every 14 (fourteen) days 6 mL 4    fluticasone (FLONASE) 50 mcg/act nasal spray 2 sprays into each nostril daily      Insulin Pen Needle 31G X 8 MM MISC Use once a week 100 each 1    liraglutide (SAXENDA) injection Start 0 6mg daily, increase once weekly by 0 6mg to max 3mg daily 45 mL 3    Plecanatide 3 MG TABS Take 3 mg by mouth daily 90 tablet 3    topiramate (TOPAMAX) 100 mg tablet Take 1 tablet (100 mg total) by mouth daily 90 tablet 3    valACYclovir (VALTREX) 500 mg tablet Take 1 tablet (500 mg total) by mouth 2 (two) times a day 180 tablet 3     No current facility-administered medications for this visit           Health Maintenance     Health Maintenance   Topic Date Due    Influenza Vaccine (1) 09/01/2022    HIV Screening  03/18/2024 (Originally 11/7/1985)    Hepatitis C Screening  04/18/2024 (Originally 1970)    Cervical Cancer Screening  01/02/2023    BMI: Followup Plan  03/18/2023    Annual Physical  03/18/2023    BMI: Adult  08/12/2023    Colorectal Cancer Screening  08/14/2023    Breast Cancer Screening: Mammogram  01/03/2024    DTaP,Tdap,and Td Vaccines (2 - Td or Tdap) 06/18/2031    COVID-19 Vaccine  Completed    Pneumococcal Vaccine: Pediatrics (0 to 5 Years) and At-Risk Patients (6 to 59 Years)  Aged Out    HIB Vaccine  Aged Out    Hepatitis B Vaccine  Aged Out    IPV Vaccine  Aged Out    Hepatitis A Vaccine  Aged Out    Meningococcal ACWY Vaccine  Aged Out    HPV Vaccine  Aged Out     Immunization History   Administered Date(s) Administered    COVID-19 PFIZER VACCINE 0 3 ML IM 12/23/2020, 01/12/2021, 08/21/2021, 02/05/2022    H1N1, All Formulations 11/02/2009    INFLUENZA 10/24/2019, 11/08/2020    Influenza, seasonal, injectable 10/15/2013    Influenza, seasonal, injectable, preservative free 10/24/2019    Tdap 06/18/2021          ANIVAL Choi

## 2022-09-20 ENCOUNTER — PATIENT MESSAGE (OUTPATIENT)
Dept: FAMILY MEDICINE CLINIC | Facility: CLINIC | Age: 52
End: 2022-09-20

## 2022-10-21 DIAGNOSIS — L20.9 ATOPIC DERMATITIS, UNSPECIFIED TYPE: ICD-10-CM

## 2022-10-21 RX ORDER — DUPILUMAB 300 MG/2ML
INJECTION, SOLUTION SUBCUTANEOUS
Qty: 4 ML | Refills: 0 | Status: SHIPPED | OUTPATIENT
Start: 2022-10-21

## 2022-10-21 NOTE — TELEPHONE ENCOUNTER
LVM for patient to notify her that we received her VM and that Dr Darian Narayan has put in an order to her [facility] pharmacy in [place] to  the [phone]   If any concerns to please call us back

## 2022-10-27 ENCOUNTER — IMMUNIZATIONS (OUTPATIENT)
Dept: FAMILY MEDICINE CLINIC | Facility: CLINIC | Age: 52
End: 2022-10-27

## 2022-10-27 DIAGNOSIS — Z23 NEED FOR COVID-19 VACCINE: Primary | ICD-10-CM

## 2022-11-07 DIAGNOSIS — M54.41 ACUTE BILATERAL LOW BACK PAIN WITH BILATERAL SCIATICA: ICD-10-CM

## 2022-11-07 DIAGNOSIS — M54.42 ACUTE BILATERAL LOW BACK PAIN WITH BILATERAL SCIATICA: ICD-10-CM

## 2022-11-07 RX ORDER — CYCLOBENZAPRINE HCL 10 MG
TABLET ORAL
Qty: 45 TABLET | Refills: 0 | Status: SHIPPED | OUTPATIENT
Start: 2022-11-07

## 2022-11-09 ENCOUNTER — DOCUMENTATION (OUTPATIENT)
Dept: FAMILY MEDICINE CLINIC | Facility: CLINIC | Age: 52
End: 2022-11-09

## 2022-12-02 ENCOUNTER — TELEPHONE (OUTPATIENT)
Dept: OTHER | Facility: OTHER | Age: 52
End: 2022-12-02

## 2022-12-02 DIAGNOSIS — Z87.442 HISTORY OF NEPHROLITHIASIS: Primary | ICD-10-CM

## 2022-12-02 NOTE — TELEPHONE ENCOUNTER
Pt called in stating she has been watching 6 kidney stones  She urinated 3 times in the middle of the night and she says it might be the stones  She also has back pain that is relived from urinating   She is requesting a call back after 2pm

## 2022-12-02 NOTE — TELEPHONE ENCOUNTER
Returned call to a patient  Last seen in our [address] location on 11/23/20 for nephrolithiasis by Tremaine Valenzuela   Patient reports frequency and urgency  + nocturia   No reported fevers, chills nausea or vomiting  Reports b/l back pain 1/10  Patient is only available on Friday after 2pm  Also requesting appt after margarita   She will be taking off that week and will be available for an appt  Patient has not been seen in our office in 2 years due to covid  Appt given in our [facility] location due to availability in [place]  Patient is not sure if she would need repeat imaging?

## 2022-12-07 ENCOUNTER — APPOINTMENT (OUTPATIENT)
Dept: LAB | Age: 52
End: 2022-12-07

## 2022-12-07 DIAGNOSIS — Z87.442 HISTORY OF NEPHROLITHIASIS: ICD-10-CM

## 2022-12-07 LAB
BACTERIA UR QL AUTO: ABNORMAL /HPF
BILIRUB UR QL STRIP: NEGATIVE
CLARITY UR: CLEAR
COLOR UR: ABNORMAL
GLUCOSE UR STRIP-MCNC: NEGATIVE MG/DL
HGB UR QL STRIP.AUTO: ABNORMAL
KETONES UR STRIP-MCNC: NEGATIVE MG/DL
LEUKOCYTE ESTERASE UR QL STRIP: NEGATIVE
NITRITE UR QL STRIP: NEGATIVE
NON-SQ EPI CELLS URNS QL MICRO: ABNORMAL /HPF
PH UR STRIP.AUTO: 7 [PH]
PROT UR STRIP-MCNC: NEGATIVE MG/DL
RBC #/AREA URNS AUTO: ABNORMAL /HPF
SP GR UR STRIP.AUTO: 1.01 (ref 1–1.03)
UROBILINOGEN UR STRIP-ACNC: <2 MG/DL
WBC #/AREA URNS AUTO: ABNORMAL /HPF

## 2022-12-09 LAB — BACTERIA UR CULT: NORMAL

## 2022-12-10 DIAGNOSIS — L20.9 ATOPIC DERMATITIS, UNSPECIFIED TYPE: ICD-10-CM

## 2022-12-12 ENCOUNTER — TELEPHONE (OUTPATIENT)
Dept: UROLOGY | Facility: MEDICAL CENTER | Age: 52
End: 2022-12-12

## 2022-12-13 RX ORDER — DUPILUMAB 300 MG/2ML
300 INJECTION, SOLUTION SUBCUTANEOUS
Qty: 4 ML | Refills: 2 | Status: SHIPPED | OUTPATIENT
Start: 2022-12-13 | End: 2023-02-22

## 2022-12-14 ENCOUNTER — OFFICE VISIT (OUTPATIENT)
Dept: UROLOGY | Facility: AMBULATORY SURGERY CENTER | Age: 52
End: 2022-12-14

## 2022-12-14 VITALS
DIASTOLIC BLOOD PRESSURE: 78 MMHG | HEIGHT: 65 IN | WEIGHT: 155 LBS | BODY MASS INDEX: 25.83 KG/M2 | HEART RATE: 75 BPM | OXYGEN SATURATION: 96 % | SYSTOLIC BLOOD PRESSURE: 118 MMHG

## 2022-12-14 DIAGNOSIS — R10.9 FLANK PAIN: Primary | ICD-10-CM

## 2022-12-14 DIAGNOSIS — N39.3 STRESS INCONTINENCE: ICD-10-CM

## 2022-12-14 DIAGNOSIS — R35.0 URINARY FREQUENCY: ICD-10-CM

## 2022-12-14 RX ORDER — CYCLOSPORINE 0.5 MG/ML
EMULSION OPHTHALMIC
COMMUNITY
Start: 2022-09-28

## 2022-12-14 NOTE — PROGRESS NOTES
12/14/22    Maggi Garvin   1970   [id number]     Assessment  1 Nephrolithiasis   2 Urinary frequency  3 Flank pain     Discussion/Plan  1  History of nephrolithiasis  2 Urinary frequency   OAB medications deferred due to side effects of constipation   12/7/22 Urine culture contaminated  3 Flank pain    CT renal stone study ordered and scheduled for 12/27/22    Educational packet provided, ER precautions reviewed  4 Stress incontinence   Referral to pelvic floor PT     Await imaging  Subjective  HPI   Maggi Garvin is a 47year old female with a past history of nephrolithiasis  She has not been seen since 2020  She reports frequency, nocturia, bladder pressure/dysuria, and flank pain  She has CT stone study scheduled 12/27/22 to further evaluate  She has stress incontinence with sneezing  She had a vaginal delivery 22 years ago  She was taking Detrol in the past for OAB  She stopped taking this due to issues with constipation  Review of Systems - History obtained from chart review and the patient  General ROS: negative  Psychological ROS: negative  Respiratory ROS: no cough, shortness of breath, or wheezing  Cardiovascular ROS: no chest pain or dyspnea on exertion  Gastrointestinal ROS: no abdominal pain, change in bowel habits, or black or bloody stools  Genito-Urinary ROS: positive for - dysuria, incontinence, nocturia and urinary frequency/urgency  Musculoskeletal ROS: positive for - pain in back - bilateral, generalized  Neurological ROS: no TIA or stroke symptoms  Dermatological ROS: negative       Objective  Physical Exam  Vitals and nursing note reviewed  Constitutional:       General: She is not in acute distress  Appearance: Normal appearance  She is normal weight  She is not ill-appearing, toxic-appearing or diaphoretic  HENT:      Head: Normocephalic and atraumatic  Pulmonary:      Effort: Pulmonary effort is normal  No respiratory distress  Abdominal:      Tenderness:  There is no right CVA tenderness or left CVA tenderness  Musculoskeletal:         General: Normal range of motion  Cervical back: Normal range of motion  Skin:     General: Skin is warm and dry  Neurological:      General: No focal deficit present  Mental Status: She is alert and oriented to person, place, and time  Mental status is at baseline  Psychiatric:         Mood and Affect: Mood normal          Behavior: Behavior normal          Thought Content: Thought content normal          Judgment: Judgment normal               ANIVAL Jeter     I have spent 15 minutes with Patient  today in which greater than 50% of this time was spent in counseling/coordination of care regarding Risks and benefits of tx options, Intructions for management, Patient and family education, Importance of tx compliance, Risk factor reductions and Impressions

## 2022-12-27 ENCOUNTER — HOSPITAL ENCOUNTER (OUTPATIENT)
Dept: RADIOLOGY | Age: 52
Discharge: HOME | End: 2022-12-27

## 2022-12-27 DIAGNOSIS — Z87.442 HISTORY OF NEPHROLITHIASIS: ICD-10-CM

## 2022-12-30 ENCOUNTER — TELEPHONE (OUTPATIENT)
Dept: UROLOGY | Facility: AMBULATORY SURGERY CENTER | Age: 52
End: 2022-12-30

## 2022-12-30 ENCOUNTER — OFFICE VISIT (OUTPATIENT)
Dept: UROLOGY | Facility: CLINIC | Age: 52
End: 2022-12-30

## 2022-12-30 VITALS
DIASTOLIC BLOOD PRESSURE: 76 MMHG | OXYGEN SATURATION: 99 % | HEART RATE: 76 BPM | WEIGHT: 150 LBS | BODY MASS INDEX: 24.11 KG/M2 | SYSTOLIC BLOOD PRESSURE: 118 MMHG | HEIGHT: 66 IN

## 2022-12-30 DIAGNOSIS — R10.9 FLANK PAIN: Primary | ICD-10-CM

## 2022-12-30 DIAGNOSIS — R35.0 URINARY FREQUENCY: ICD-10-CM

## 2022-12-30 LAB
SL AMB  POCT GLUCOSE, UA: NORMAL
SL AMB LEUKOCYTE ESTERASE,UA: NORMAL
SL AMB POCT BILIRUBIN,UA: NORMAL
SL AMB POCT BLOOD,UA: NORMAL
SL AMB POCT CLARITY,UA: CLEAR
SL AMB POCT COLOR,UA: YELLOW
SL AMB POCT KETONES,UA: NORMAL
SL AMB POCT NITRITE,UA: NORMAL
SL AMB POCT PH,UA: 6.5
SL AMB POCT SPECIFIC GRAVITY,UA: 1.01
SL AMB POCT URINE PROTEIN: NORMAL
SL AMB POCT UROBILINOGEN: 0.2

## 2022-12-30 NOTE — TELEPHONE ENCOUNTER
Patient of Dr Anai Valadez, also a Physician asked to speak with an   She explained that she had an appointment for today and it was canceled for the 5th time  She said she understands that emergencies happen but that she really needed to be seen and not only that but that she has to cancel her patients to plan to come here to then be canceled  She was nice about it, just frustrated that she has a kidney stone and urinary issues that would like to get resolved  Never was she rude or disrespectful  I called the office for Gayatri Dai but Carrie Yan said she was off today, that the only ones there were Freddie Reyes and Dr Anai Valadez  I asked to speak to one of them while the patient was on hold, but they were both with patients  Freddie Reyes called me back and explained to me that she was there alone and could not see the patient to let my  know because there was nothing we could do for her  I asked her if Dr Anai Valadez could speak to the patient before she left and she said she would mention it but wasn't sure  I explained to patient that I would let my  know and someone would get back to her      Patient can be reached at [phone]

## 2022-12-30 NOTE — PROGRESS NOTES
1  Flank pain  POCT urine dip      2  Urinary frequency  Mirabegron ER 50 MG TB24            Assessment and plan:       1  nephrolithiasis  - small nonobstructing intrarenal calculi  - discussed that this is likely not contributing to her most recent back pain  We reviewed her CT from 2020 with comparable stone burden  - I did  patient that topamax can increase stone burden  - proper hydration advised  - no surgical intervention recommended at this time    2  Urinary frequency and urgency  - not a candidate for anticholinergics due to severe constipation  - trial of myrbetriq  Use and side effect profile reviewed  - return to clinic 2-3 months       Rashaad Chacon PA-C      Chief Complaint     Kidney stones    History of Present Illness     Jo Ann Wells is a 47 y o  male with a history of nephrolithiasis presenting for follow-up  Patient most recently was seen few weeks ago by our team in regards to bilateral back pain over the past few months  She typically will experience this in the middle of the night which wakes her up from sleep and prompts her to void  Patient does report urinary frequency and urgency during the daytime  Voiding typically in between every patient  She does have an urge component to it  Denies any urge incontinence  Does have some stress urinary incontinence however it is mild and controllable for her  Denies any dysuria or gross hematuria  Recent urine testing was negative for microscopic hematuria or infection  Mixed contaminants on culture  Laboratory     Lab Results   Component Value Date    CREATININE 1 02 05/29/2022     Review of Systems     Review of Systems   Constitutional: Negative for activity change, appetite change, chills, diaphoresis, fatigue, fever and unexpected weight change  Respiratory: Negative for chest tightness and shortness of breath  Cardiovascular: Negative for chest pain, palpitations and leg swelling     Gastrointestinal: Negative for abdominal distention, abdominal pain, constipation, diarrhea, nausea and vomiting  Genitourinary: Positive for frequency and urgency  Negative for decreased urine volume, difficulty urinating, dysuria, enuresis, flank pain, genital sores and hematuria  Musculoskeletal: Positive for back pain  Negative for gait problem and myalgias  Skin: Negative for color change, pallor, rash and wound  Psychiatric/Behavioral: Negative for behavioral problems  The patient is not nervous/anxious  Allergies     No Known Allergies    Physical Exam     Physical Exam  Constitutional:       General: She is not in acute distress  Appearance: Normal appearance  She is normal weight  She is not ill-appearing, toxic-appearing or diaphoretic  HENT:      Head: Normocephalic and atraumatic  Eyes:      General:         Right eye: No discharge  Left eye: No discharge  Conjunctiva/sclera: Conjunctivae normal    Pulmonary:      Effort: Pulmonary effort is normal  No respiratory distress  Musculoskeletal:         General: No swelling or tenderness  Normal range of motion  Skin:     General: Skin is warm and dry  Coloration: Skin is not jaundiced or pale  Neurological:      General: No focal deficit present  Mental Status: She is alert and oriented to person, place, and time  Psychiatric:         Mood and Affect: Mood normal          Behavior: Behavior normal          Thought Content:  Thought content normal            Vital Signs     Vitals:    12/30/22 1501   BP: 118/76   BP Location: Left arm   Patient Position: Sitting   Pulse: 76   SpO2: 99%   Weight: 68 kg (150 lb)   Height: 5' 6" (1 676 m)         Current Medications       Current Outpatient Medications:   •  ALPRAZolam (XANAX) 0 25 mg tablet, Take 1 tablet (0 25 mg total) by mouth 3 (three) times a day as needed for anxiety, Disp: 30 tablet, Rfl: 0  •  b complex vitamins capsule, Take 1 capsule by mouth daily, Disp: , Rfl:   •  B-D ALLERGY SYRINGE 1CC/28G 28G X 1/2" 1 ML MISC, , Disp: , Rfl: 2  •  Cholecalciferol (VITAMIN D3) 2000 units capsule, Take 2 capsules by mouth daily  , Disp: , Rfl:   •  citalopram (CeleXA) 40 mg tablet, Take 1 tablet (40 mg total) by mouth daily, Disp: 90 tablet, Rfl: 3  •  cyclobenzaprine (FLEXERIL) 10 mg tablet, TAKE ONE TABLET BY MOUTH 3 TIMES A DAY AS NEEDED FOR MUSCLE SPASMS, Disp: 45 tablet, Rfl: 0  •  Dupixent subcutaneous injection, Inject 2 mL (300 mg total) under the skin every 14 (fourteen) days for 6 doses, Disp: 4 mL, Rfl: 2  •  fluticasone (FLONASE) 50 mcg/act nasal spray, 2 sprays into each nostril daily, Disp: , Rfl:   •  Insulin Pen Needle 31G X 8 MM MISC, Use once a week, Disp: 100 each, Rfl: 1  •  liraglutide (SAXENDA) injection, Start 0 6mg daily, increase once weekly by 0 6mg to max 3mg daily, Disp: 45 mL, Rfl: 3  •  Mirabegron ER 50 MG TB24, Take 1 tablet (50 mg total) by mouth in the morning, Disp: 30 tablet, Rfl: 3  •  Plecanatide 3 MG TABS, Take 3 mg by mouth daily, Disp: 90 tablet, Rfl: 3  •  Restasis 0 05 % ophthalmic emulsion, , Disp: , Rfl:   •  topiramate (TOPAMAX) 100 mg tablet, Take 1 tablet (100 mg total) by mouth daily, Disp: 90 tablet, Rfl: 3  •  valACYclovir (VALTREX) 500 mg tablet, Take 1 tablet (500 mg total) by mouth 2 (two) times a day, Disp: 180 tablet, Rfl: 3      Active Problems     Patient Active Problem List   Diagnosis   • Anxiety   • Dizziness   • Hyperreflexia   • Migraine   • Depression   • Meningioma (HCC)   • Mass of skull   • Constipation by delayed colonic transit   • Allergic rhinitis due to pollen   • Nephrolithiasis   • Vitamin D deficiency   • Family history of colon cancer   • Acute bilateral low back pain with bilateral sciatica   • Class 1 obesity due to excess calories without serious comorbidity with body mass index (BMI) of 30 0 to 30 9 in adult   • Herpes genitalis in women         Past Medical History     Past Medical History:   Diagnosis Date   • Anxiety    • Asthma     Last assessed 9/25/2014   • Colon polyp    • Constipation    • Eczema    • Irritable bowel syndrome (IBS)    • Seasonal allergies    • [place] teeth extracted          Surgical History     Past Surgical History:   Procedure Laterality Date   • COLONOSCOPY     • HYSTERECTOMY  2015   • TONSILLECTOMY AND ADENOIDECTOMY     • WISDOM TOOTH EXTRACTION           Family History     Family History   Problem Relation Age of Onset   • Hypertension Mother    • Colon cancer Father 50   • No Known Problems Sister    • No Known Problems Daughter    • No Known Problems Maternal Grandmother    • No Known Problems Maternal Grandfather    • No Known Problems Paternal Grandmother    • No Known Problems Paternal Grandfather    • No Known Problems Maternal Aunt          Social History     Social History       Radiology

## 2022-12-30 NOTE — TELEPHONE ENCOUNTER
Late note: Called and spoke with patient at approx 12:50pm  I apologized for the late cancellation of her appt with Dr Remy Winn  Reviewed due to holiday week there are many providers on PTO  Advised we could accommodate 315pm appt today in the [facility] location with Tsa[place]  Patient agreed and was thankful to be able to have appt today  Address and directions for [facility] office provided

## 2023-01-09 ENCOUNTER — HOSPITAL ENCOUNTER (OUTPATIENT)
Dept: RADIOLOGY | Age: 53
Discharge: HOME | End: 2023-01-09

## 2023-01-09 VITALS — BODY MASS INDEX: 24.11 KG/M2 | WEIGHT: 150 LBS | HEIGHT: 66 IN

## 2023-01-09 DIAGNOSIS — Z12.31 SCREENING MAMMOGRAM, ENCOUNTER FOR: ICD-10-CM

## 2023-02-07 ENCOUNTER — HOSPITAL ENCOUNTER (OUTPATIENT)
Dept: ULTRASOUND IMAGING | Facility: CLINIC | Age: 53
Discharge: HOME | End: 2023-02-07

## 2023-02-07 ENCOUNTER — HOSPITAL ENCOUNTER (OUTPATIENT)
Dept: MAMMOGRAPHY | Facility: CLINIC | Age: 53
Discharge: HOME | End: 2023-02-07

## 2023-02-07 DIAGNOSIS — R92.8 ABNORMAL SCREENING MAMMOGRAM: ICD-10-CM

## 2023-03-07 DIAGNOSIS — E66.09 CLASS 1 OBESITY DUE TO EXCESS CALORIES WITHOUT SERIOUS COMORBIDITY WITH BODY MASS INDEX (BMI) OF 30.0 TO 30.9 IN ADULT: ICD-10-CM

## 2023-03-07 RX ORDER — LIRAGLUTIDE 6 MG/ML
INJECTION, SOLUTION SUBCUTANEOUS
Qty: 15 ML | Refills: 0 | Status: SHIPPED | OUTPATIENT
Start: 2023-03-07

## 2023-03-07 RX ORDER — PEN NEEDLE, DIABETIC 31 GX5/16"
NEEDLE, DISPOSABLE MISCELLANEOUS
Qty: 100 EACH | Refills: 0 | Status: SHIPPED | OUTPATIENT
Start: 2023-03-07

## 2023-03-15 ENCOUNTER — TELEMEDICINE (OUTPATIENT)
Dept: FAMILY MEDICINE CLINIC | Facility: CLINIC | Age: 53
End: 2023-03-15

## 2023-03-15 DIAGNOSIS — H10.33 ACUTE BACTERIAL CONJUNCTIVITIS OF BOTH EYES: Primary | ICD-10-CM

## 2023-03-15 PROBLEM — E66.09 CLASS 1 OBESITY DUE TO EXCESS CALORIES WITHOUT SERIOUS COMORBIDITY WITH BODY MASS INDEX (BMI) OF 30.0 TO 30.9 IN ADULT: Status: RESOLVED | Noted: 2022-03-18 | Resolved: 2023-03-15

## 2023-03-15 PROBLEM — E66.811 CLASS 1 OBESITY DUE TO EXCESS CALORIES WITHOUT SERIOUS COMORBIDITY WITH BODY MASS INDEX (BMI) OF 30.0 TO 30.9 IN ADULT: Status: RESOLVED | Noted: 2022-03-18 | Resolved: 2023-03-15

## 2023-03-15 RX ORDER — IBUPROFEN 200 MG
TABLET ORAL
COMMUNITY
Start: 2023-01-03

## 2023-03-15 RX ORDER — GENTAMICIN SULFATE 3 MG/ML
1 SOLUTION/ DROPS OPHTHALMIC 4 TIMES DAILY
Qty: 5 ML | Refills: 0 | Status: SHIPPED | OUTPATIENT
Start: 2023-03-15

## 2023-03-15 RX ORDER — DUPILUMAB 300 MG/2ML
INJECTION, SOLUTION SUBCUTANEOUS
COMMUNITY
Start: 2023-02-24

## 2023-03-15 NOTE — PROGRESS NOTES
Virtual Regular Visit    Verification of patient location:    Patient is located in the following state in which I hold an active license [place]      Assessment/Plan:    Problem List Items Addressed This Visit    None  Visit Diagnoses     Acute bacterial conjunctivitis of both eyes    -  Primary    Relevant Medications    gentamicin (GARAMYCIN) 0 3 % ophthalmic solution               Reason for visit is   Chief Complaint   Patient presents with   • Virtual Brief Visit   • Virtual Regular Visit     Eye drainage          Encounter provider ANIVAL Larkin    Provider located at [address] [id number]      Recent Visits  No visits were found meeting these conditions  Showing recent visits within past 7 days and meeting all other requirements  Today's Visits  Date Type Provider Dept   03/15/23 Telemedicine ANIVAL Larkin [facility]   Showing today's visits and meeting all other requirements  Future Appointments  No visits were found meeting these conditions  Showing future appointments within next 150 days and meeting all other requirements       The patient was identified by name and date of birth  Cammie Guerin was informed that this is a telemedicine visit and that the visit is being conducted through the [address] [place] [address] Road [employer] platform  She agrees to proceed     My office door was closed  No one else was in the room  She acknowledged consent and understanding of privacy and security of the video platform  The patient has agreed to participate and understands they can discontinue the visit at any time  Patient is aware this is a billable service       Subjective  Cammie Guerin is a 47 y o  female       Having reddened sclera, gritty sensation and pain to the orbits  Yellow exudate  Started using over one year old tobramycin eye drops  Not better         Past Medical History:   Diagnosis Date   • Anxiety    • Asthma     Last assessed 9/25/2014 • Colon polyp    • Constipation    • Eczema    • Irritable bowel syndrome (IBS)    • Seasonal allergies    • [place] teeth extracted        Past Surgical History:   Procedure Laterality Date   • COLONOSCOPY     • HYSTERECTOMY  2015   • TONSILLECTOMY AND ADENOIDECTOMY     • WISDOM TOOTH EXTRACTION         Current Outpatient Medications   Medication Sig Dispense Refill   • ALPRAZolam (XANAX) 0 25 mg tablet Take 1 tablet (0 25 mg total) by mouth 3 (three) times a day as needed for anxiety 30 tablet 0   • b complex vitamins capsule Take 1 capsule by mouth daily     • B-D ALLERGY SYRINGE 1CC/28G 28G X 1/2" 1 ML MISC   2   • B-D ULTRAFINE III SHORT PEN 31G X 8 MM MISC USE AS DIRECTED 100 each 0   • Cholecalciferol (VITAMIN D3) 2000 units capsule Take 2 capsules by mouth daily       • citalopram (CeleXA) 40 mg tablet Take 1 tablet (40 mg total) by mouth daily 90 tablet 3   • cyclobenzaprine (FLEXERIL) 10 mg tablet TAKE ONE TABLET BY MOUTH 3 TIMES A DAY AS NEEDED FOR MUSCLE SPASMS 45 tablet 0   • Dulera 100-5 MCG/ACT inhaler      • Dupixent subcutaneous injection      • fluticasone (FLONASE) 50 mcg/act nasal spray 2 sprays into each nostril daily     • gentamicin (GARAMYCIN) 0 3 % ophthalmic solution Administer 1 drop to both eyes 4 (four) times a day 5 mL 0   • INSULIN SYRINGE 1CC/29G 29G X 1/2" 1 ML MISC      • liraglutide (Saxenda) injection START 0 6MG DAILY,INCREASE ONCE WEEKLY BY 0 6MG TO MAX 3MG DAILY 15 mL 0   • Mirabegron ER 50 MG TB24 Take 1 tablet (50 mg total) by mouth in the morning 30 tablet 3   • Plecanatide 3 MG TABS Take 3 mg by mouth daily 90 tablet 3   • Restasis 0 05 % ophthalmic emulsion      • topiramate (TOPAMAX) 100 mg tablet Take 1 tablet (100 mg total) by mouth daily 90 tablet 3   • valACYclovir (VALTREX) 500 mg tablet Take 1 tablet (500 mg total) by mouth 2 (two) times a day 180 tablet 3     No current facility-administered medications for this visit          No Known Allergies    Review of Systems   Constitutional: Negative for fatigue and fever  HENT: Negative for congestion, postnasal drip and rhinorrhea  Eyes: Positive for pain, discharge and redness  Negative for photophobia and visual disturbance  Respiratory: Negative for cough and shortness of breath  Cardiovascular: Negative for chest pain and palpitations  Musculoskeletal: Negative for arthralgias and myalgias  Skin: Negative for rash  Neurological: Negative for dizziness and headaches  Hematological: Negative for adenopathy  Psychiatric/Behavioral: Negative for dysphoric mood, sleep disturbance and suicidal ideas  The patient is not nervous/anxious  Video Exam    Vitals:       Physical Exam  Constitutional:       Appearance: Normal appearance  HENT:      Head: Normocephalic and atraumatic  Eyes:      General: Lids are normal       Conjunctiva/sclera:      Right eye: Right conjunctiva is injected  Left eye: Left conjunctiva is injected  Pulmonary:      Effort: Pulmonary effort is normal    Musculoskeletal:         General: Normal range of motion  Cervical back: Normal range of motion  Neurological:      Mental Status: She is alert and oriented to person, place, and time     Psychiatric:         Mood and Affect: Mood normal          Behavior: Behavior normal           I spent 10 minutes with patient today in which greater than 50% of the time was spent in counseling/coordination of care regarding conjunctivitis

## 2023-03-19 DIAGNOSIS — A60.09 HERPES GENITALIS IN WOMEN: ICD-10-CM

## 2023-03-20 RX ORDER — VALACYCLOVIR HYDROCHLORIDE 500 MG/1
500 TABLET, FILM COATED ORAL 2 TIMES DAILY
Qty: 180 TABLET | Refills: 0 | Status: SHIPPED | OUTPATIENT
Start: 2023-03-20 | End: 2023-06-18

## 2023-03-27 DIAGNOSIS — L20.9 ATOPIC DERMATITIS, UNSPECIFIED TYPE: Primary | ICD-10-CM

## 2023-03-27 RX ORDER — DUPILUMAB 300 MG/2ML
300 INJECTION, SOLUTION SUBCUTANEOUS
Qty: 6 ML | Refills: 7 | Status: SHIPPED | OUTPATIENT
Start: 2023-03-27 | End: 2024-03-26

## 2023-03-29 ENCOUNTER — TELEMEDICINE (OUTPATIENT)
Dept: DERMATOLOGY | Age: 53
End: 2023-03-29

## 2023-03-29 DIAGNOSIS — L20.9 ATOPIC DERMATITIS, UNSPECIFIED TYPE: Primary | ICD-10-CM

## 2023-03-29 RX ORDER — TOBRAMYCIN AND DEXAMETHASONE 3; 1 MG/ML; MG/ML
SUSPENSION/ DROPS OPHTHALMIC
COMMUNITY
Start: 2023-03-16

## 2023-03-29 NOTE — PATIENT INSTRUCTIONS
ATOPIC DERMATITIS FOLLOW UP      Assessment and Plan:  Based on a thorough discussion of this condition and the management approach to it (including a comprehensive discussion of the known risks, side effects and potential benefits of treatment), the patient (family) agrees to implement the following specific plan:  Continue tobramycin eye drops and dexamethasone eye drops per optometrist   Continue light treatment once weekly  Dupixent maybe decrease once every 3 weeks to see if it helps with eye symptoms

## 2023-03-29 NOTE — PROGRESS NOTES
"[facility] Note     Patient Name: Vale Farias  Encounter Date: 3/29/23     Have you been cared for by a [facility] Dermatologist in the last 3 years and, if so, which description applies to you? Yes  I have been here within the last 3 years, and my medical history has NOT changed since that time  I am FEMALE/of child-bearing potential     REVIEW OF SYSTEMS:  Have you recently had or currently have any of the following? · No changes in my recent health  PAST MEDICAL HISTORY:  Have you personally ever had or currently have any of the following? If \"YES,\" then please provide more detail  · No changes in my medical history  FAMILY HISTORY:  Any \"first degree relatives\" (parent, brother, sister, or child) with the following? • No changes in my family's known health  PATIENT EXPERIENCE:    • Do you want the Dermatologist to perform a COMPLETE skin exam today including a clinical examination under the \"bra and underwear\" areas? NO  • If necessary, do we have your permission to call and leave a detailed message on your Preferred Phone number that includes your specific medical information?   Yes      No Known Allergies   Current Outpatient Medications:   •  ALPRAZolam (XANAX) 0 25 mg tablet, Take 1 tablet (0 25 mg total) by mouth 3 (three) times a day as needed for anxiety, Disp: 30 tablet, Rfl: 0  •  b complex vitamins capsule, Take 1 capsule by mouth daily, Disp: , Rfl:   •  B-D ALLERGY SYRINGE 1CC/28G 28G X 1/2\" 1 ML MISC, , Disp: , Rfl: 2  •  B-D ULTRAFINE III SHORT PEN 31G X 8 MM MISC, USE AS DIRECTED, Disp: 100 each, Rfl: 0  •  Cholecalciferol (VITAMIN D3) 2000 units capsule, Take 2 capsules by mouth daily  , Disp: , Rfl:   •  citalopram (CeleXA) 40 mg tablet, Take 1 tablet (40 mg total) by mouth daily, Disp: 90 tablet, Rfl: 3  •  cyclobenzaprine (FLEXERIL) 10 mg tablet, TAKE ONE TABLET BY MOUTH 3 TIMES A DAY AS NEEDED FOR MUSCLE SPASMS, Disp: 45 tablet, Rfl: 0  •  Dulera 100-5 " "MCG/ACT inhaler, , Disp: , Rfl:   •  Dupixent subcutaneous injection, Inject 2 mL (300 mg total) under the skin every 14 (fourteen) days, Disp: 6 mL, Rfl: 7  •  fluticasone (FLONASE) 50 mcg/act nasal spray, 2 sprays into each nostril daily, Disp: , Rfl:   •  gentamicin (GARAMYCIN) 0 3 % ophthalmic solution, Administer 1 drop to both eyes 4 (four) times a day, Disp: 5 mL, Rfl: 0  •  INSULIN SYRINGE 1CC/29G 29G X 1/2\" 1 ML MISC, , Disp: , Rfl:   •  liraglutide (Saxenda) injection, START 0 6MG DAILY,INCREASE ONCE WEEKLY BY 0 6MG TO MAX 3MG DAILY, Disp: 15 mL, Rfl: 0  •  Mirabegron ER 50 MG TB24, Take 1 tablet (50 mg total) by mouth in the morning, Disp: 30 tablet, Rfl: 3  •  Plecanatide 3 MG TABS, Take 3 mg by mouth daily, Disp: 90 tablet, Rfl: 3  •  Restasis 0 05 % ophthalmic emulsion, , Disp: , Rfl:   •  topiramate (TOPAMAX) 100 mg tablet, Take 1 tablet (100 mg total) by mouth daily, Disp: 90 tablet, Rfl: 3  •  valACYclovir (VALTREX) 500 mg tablet, Take 1 tablet (500 mg total) by mouth 2 (two) times a day, Disp: 180 tablet, Rfl: 0  •  tobramycin-dexamethasone (TOBRADEX) ophthalmic suspension, , Disp: , Rfl:           • Whom besides the patient is providing clinical information about today's encounter?   o NO ADDITIONAL HISTORIAN (patient alone provided history)  o Patient here for virtual visit to discuss Dupixent refills  Patient stated she is currently clear  Still doing home phototherapy once weekly in the winter only  Physical Exam and Assessment/Plan by Diagnosis:    ATOPIC DERMATITIS FOLLOW UP  Physical Exam:  • Anatomic Location Affected:  Completed clear  • Morphological Description:  Not visualized  • Pertinent Positives:  • Pertinent Negatives:     Additional History of Present Condition:  See above notation    Assessment and Plan:  Based on a thorough discussion of this condition and the management approach to it (including a comprehensive discussion of the known risks, side effects and potential " benefits of treatment), the patient (family) agrees to implement the following specific plan:  • Continue tobramycin eye drops and dexamethasone eye drops per optometrist   • Continue light treatment once weekly  • Dupixent maybe decrease once every 3 weeks to see if it helps with eye symptoms  Scribe Attestation    I,:  Sheryle Alf am acting as a scribe while in the presence of the attending physician :       I,:  Rocío Lee MD personally performed the services described in this documentation    as scribed in my presence :         Virtual Regular Visit    Verification of patient location:    Patient is located in the following state in which I hold an active license [place]      Assessment/Plan:    Problem List Items Addressed This Visit    None  Visit Diagnoses     Atopic dermatitis, unspecified type    -  Primary               Reason for visit is   Chief Complaint   Patient presents with   • Virtual Regular Visit        Encounter provider Florence Hardin MD    Provider located at [address] [place] [address]  [phone]      Recent Visits  No visits were found meeting these conditions  Showing recent visits within past 7 days and meeting all other requirements  Today's Visits  Date Type Provider Dept   03/29/23 Telemedicine Rocío Lee MD [facility]   Showing today's visits and meeting all other requirements  Future Appointments  No visits were found meeting these conditions  Showing future appointments within next 150 days and meeting all other requirements       The patient was identified by name and date of birth  [facility] was informed that this is a telemedicine visit and that the visit is being conducted through the [employer]  She agrees to proceed     My office door was closed  The patient was notified the following individuals were present in the room nicko Casas "acknowledged consent and understanding of privacy and security of the video platform  The patient has agreed to participate and understands they can discontinue the visit at any time  Patient is aware this is a billable service  Subjective  Sarah Acosta is a 47 y o  female see above         HPI     Past Medical History:   Diagnosis Date   • Anxiety    • Asthma     Last assessed 9/25/2014   • Colon polyp    • Constipation    • Eczema    • Irritable bowel syndrome (IBS)    • Seasonal allergies    • [place] teeth extracted        Past Surgical History:   Procedure Laterality Date   • COLONOSCOPY     • HYSTERECTOMY  2015   • TONSILLECTOMY AND ADENOIDECTOMY     • WISDOM TOOTH EXTRACTION         Current Outpatient Medications   Medication Sig Dispense Refill   • ALPRAZolam (XANAX) 0 25 mg tablet Take 1 tablet (0 25 mg total) by mouth 3 (three) times a day as needed for anxiety 30 tablet 0   • b complex vitamins capsule Take 1 capsule by mouth daily     • B-D ALLERGY SYRINGE 1CC/28G 28G X 1/2\" 1 ML MISC   2   • B-D ULTRAFINE III SHORT PEN 31G X 8 MM MISC USE AS DIRECTED 100 each 0   • Cholecalciferol (VITAMIN D3) 2000 units capsule Take 2 capsules by mouth daily       • citalopram (CeleXA) 40 mg tablet Take 1 tablet (40 mg total) by mouth daily 90 tablet 3   • cyclobenzaprine (FLEXERIL) 10 mg tablet TAKE ONE TABLET BY MOUTH 3 TIMES A DAY AS NEEDED FOR MUSCLE SPASMS 45 tablet 0   • Dulera 100-5 MCG/ACT inhaler      • Dupixent subcutaneous injection Inject 2 mL (300 mg total) under the skin every 14 (fourteen) days 6 mL 7   • fluticasone (FLONASE) 50 mcg/act nasal spray 2 sprays into each nostril daily     • gentamicin (GARAMYCIN) 0 3 % ophthalmic solution Administer 1 drop to both eyes 4 (four) times a day 5 mL 0   • INSULIN SYRINGE 1CC/29G 29G X 1/2\" 1 ML MISC      • liraglutide (Saxenda) injection START 0 6MG DAILY,INCREASE ONCE WEEKLY BY 0 6MG TO MAX 3MG DAILY 15 mL 0   • Mirabegron ER 50 MG TB24 Take 1 tablet (50 " mg total) by mouth in the morning 30 tablet 3   • Plecanatide 3 MG TABS Take 3 mg by mouth daily 90 tablet 3   • Restasis 0 05 % ophthalmic emulsion      • topiramate (TOPAMAX) 100 mg tablet Take 1 tablet (100 mg total) by mouth daily 90 tablet 3   • valACYclovir (VALTREX) 500 mg tablet Take 1 tablet (500 mg total) by mouth 2 (two) times a day 180 tablet 0   • tobramycin-dexamethasone (TOBRADEX) ophthalmic suspension        No current facility-administered medications for this visit          No Known Allergies    Review of Systems    Video Exam    Vitals:       Physical Exam     I spent 9 minutes directly with the patient during this visit

## 2023-04-07 ENCOUNTER — OFFICE VISIT (OUTPATIENT)
Dept: UROLOGY | Facility: CLINIC | Age: 53
End: 2023-04-07

## 2023-04-07 VITALS
DIASTOLIC BLOOD PRESSURE: 82 MMHG | OXYGEN SATURATION: 99 % | HEIGHT: 66 IN | BODY MASS INDEX: 24.43 KG/M2 | WEIGHT: 152 LBS | SYSTOLIC BLOOD PRESSURE: 128 MMHG | HEART RATE: 75 BPM

## 2023-04-07 DIAGNOSIS — N20.0 KIDNEY STONES: ICD-10-CM

## 2023-04-07 DIAGNOSIS — R10.9 FLANK PAIN: Primary | ICD-10-CM

## 2023-04-07 LAB
BACTERIA UR QL AUTO: ABNORMAL /HPF
BILIRUB UR QL STRIP: NEGATIVE
CLARITY UR: CLEAR
COLOR UR: COLORLESS
GLUCOSE UR STRIP-MCNC: NEGATIVE MG/DL
HGB UR QL STRIP.AUTO: NEGATIVE
KETONES UR STRIP-MCNC: NEGATIVE MG/DL
LEUKOCYTE ESTERASE UR QL STRIP: NEGATIVE
NITRITE UR QL STRIP: NEGATIVE
NON-SQ EPI CELLS URNS QL MICRO: ABNORMAL /HPF
PH UR STRIP.AUTO: 6 [PH]
PROT UR STRIP-MCNC: NEGATIVE MG/DL
RBC #/AREA URNS AUTO: ABNORMAL /HPF
SL AMB  POCT GLUCOSE, UA: NORMAL
SL AMB LEUKOCYTE ESTERASE,UA: NORMAL
SL AMB POCT BILIRUBIN,UA: NORMAL
SL AMB POCT BLOOD,UA: NORMAL
SL AMB POCT CLARITY,UA: CLEAR
SL AMB POCT COLOR,UA: YELLOW
SL AMB POCT KETONES,UA: NORMAL
SL AMB POCT NITRITE,UA: NORMAL
SL AMB POCT PH,UA: 5
SL AMB POCT SPECIFIC GRAVITY,UA: 1.02
SL AMB POCT URINE PROTEIN: NORMAL
SL AMB POCT UROBILINOGEN: 0.2
SP GR UR STRIP.AUTO: 1.01 (ref 1–1.03)
UROBILINOGEN UR STRIP-ACNC: <2 MG/DL
WBC #/AREA URNS AUTO: ABNORMAL /HPF

## 2023-04-07 NOTE — PROGRESS NOTES
1  Flank pain  POCT urine dip    Urinalysis with microscopic    Urine culture      2  Kidney stones  XR abdomen 1 view kub    Urinalysis with microscopic    Urine culture            Assessment and plan:       1  nephrolithiasis  - small nonobstructing intrarenal calculi  - I did  patient that topamax can increase stone burden  - proper hydration advised  - no surgical intervention recommended at this time  -Repeat KUB in 1 year    2  Urinary frequency and urgency  - not a candidate for anticholinergics due to severe constipation  -Continue Myrbetriq 50 mg       Bibi Solitario PA-C      Chief Complaint     Kidney stones    History of Present Illness     Escobar Ruiz is a 47 y o  male with a history of nephrolithiasis presenting for follow-up  Patient most recently was seen few weeks ago by our team in regards to bilateral back pain over the past few months  She typically will experience this in the middle of the night which wakes her up from sleep and prompts her to void  Patient does report urinary frequency and urgency during the daytime  Voiding typically in between every patient  She does have an urge component to it  Denies any urge incontinence  Does have some stress urinary incontinence however it is mild and controllable for her  Denies any dysuria or gross hematuria  At her last visit patient was started on Myrbetriq 50 mg  She has noticed a reduction in some of her frequency  Still has nocturia 2 times nightly  No incontinence  CT (12/27/22) nonobstruing stones, left greater than right  Large constipation  Urine dip leukocyte and nitrite negative, small blood  Laboratory     Lab Results   Component Value Date    CREATININE 1 02 05/29/2022     Review of Systems     Review of Systems   Constitutional: Negative for activity change, appetite change, chills, diaphoresis, fatigue, fever and unexpected weight change     Respiratory: Negative for chest tightness and "shortness of breath  Cardiovascular: Negative for chest pain, palpitations and leg swelling  Gastrointestinal: Negative for abdominal distention, abdominal pain, constipation, diarrhea, nausea and vomiting  Genitourinary: Positive for frequency and urgency  Negative for decreased urine volume, difficulty urinating, dysuria, enuresis, flank pain, genital sores and hematuria  Musculoskeletal: Positive for back pain  Negative for gait problem and myalgias  Skin: Negative for color change, pallor, rash and wound  Psychiatric/Behavioral: Negative for behavioral problems  The patient is not nervous/anxious  Allergies     No Known Allergies    Physical Exam     Physical Exam  Constitutional:       General: She is not in acute distress  Appearance: Normal appearance  She is normal weight  She is not ill-appearing, toxic-appearing or diaphoretic  HENT:      Head: Normocephalic and atraumatic  Eyes:      General:         Right eye: No discharge  Left eye: No discharge  Conjunctiva/sclera: Conjunctivae normal    Pulmonary:      Effort: Pulmonary effort is normal  No respiratory distress  Musculoskeletal:         General: No swelling or tenderness  Normal range of motion  Skin:     General: Skin is warm and dry  Coloration: Skin is not jaundiced or pale  Neurological:      General: No focal deficit present  Mental Status: She is alert and oriented to person, place, and time  Psychiatric:         Mood and Affect: Mood normal          Behavior: Behavior normal          Thought Content:  Thought content normal            Vital Signs     Vitals:    04/07/23 1445   BP: 128/82   BP Location: Left arm   Patient Position: Sitting   Cuff Size: Adult   Pulse: 75   SpO2: 99%   Weight: 68 9 kg (152 lb)   Height: 5' 6\" (1 676 m)         Current Medications       Current Outpatient Medications:   •  ALPRAZolam (XANAX) 0 25 mg tablet, Take 1 tablet (0 25 mg total) by mouth 3 " "(three) times a day as needed for anxiety, Disp: 30 tablet, Rfl: 0  •  b complex vitamins capsule, Take 1 capsule by mouth daily, Disp: , Rfl:   •  B-D ALLERGY SYRINGE 1CC/28G 28G X 1/2\" 1 ML MISC, , Disp: , Rfl: 2  •  B-D ULTRAFINE III SHORT PEN 31G X 8 MM MISC, USE AS DIRECTED, Disp: 100 each, Rfl: 0  •  Cholecalciferol (VITAMIN D3) 2000 units capsule, Take 2 capsules by mouth daily  , Disp: , Rfl:   •  citalopram (CeleXA) 40 mg tablet, Take 1 tablet (40 mg total) by mouth daily, Disp: 90 tablet, Rfl: 3  •  cyclobenzaprine (FLEXERIL) 10 mg tablet, TAKE ONE TABLET BY MOUTH 3 TIMES A DAY AS NEEDED FOR MUSCLE SPASMS, Disp: 45 tablet, Rfl: 0  •  Dulera 100-5 MCG/ACT inhaler, , Disp: , Rfl:   •  Dupixent subcutaneous injection, Inject 2 mL (300 mg total) under the skin every 14 (fourteen) days, Disp: 6 mL, Rfl: 7  •  fluticasone (FLONASE) 50 mcg/act nasal spray, 2 sprays into each nostril daily, Disp: , Rfl:   •  gentamicin (GARAMYCIN) 0 3 % ophthalmic solution, Administer 1 drop to both eyes 4 (four) times a day, Disp: 5 mL, Rfl: 0  •  INSULIN SYRINGE 1CC/29G 29G X 1/2\" 1 ML MISC, , Disp: , Rfl:   •  liraglutide (Saxenda) injection, START 0 6MG DAILY,INCREASE ONCE WEEKLY BY 0 6MG TO MAX 3MG DAILY, Disp: 15 mL, Rfl: 0  •  Mirabegron ER 50 MG TB24, Take 1 tablet (50 mg total) by mouth in the morning, Disp: 30 tablet, Rfl: 3  •  Plecanatide 3 MG TABS, Take 3 mg by mouth daily, Disp: 90 tablet, Rfl: 3  •  Restasis 0 05 % ophthalmic emulsion, , Disp: , Rfl:   •  tobramycin-dexamethasone (TOBRADEX) ophthalmic suspension, , Disp: , Rfl:   •  topiramate (TOPAMAX) 100 mg tablet, Take 1 tablet (100 mg total) by mouth daily, Disp: 90 tablet, Rfl: 3  •  valACYclovir (VALTREX) 500 mg tablet, Take 1 tablet (500 mg total) by mouth 2 (two) times a day, Disp: 180 tablet, Rfl: 0      Active Problems     Patient Active Problem List   Diagnosis   • Anxiety   • Dizziness   • Hyperreflexia   • Migraine   • Depression   • Meningioma (HCC) " • Mass of skull   • Constipation by delayed colonic transit   • Allergic rhinitis due to pollen   • Nephrolithiasis   • Vitamin D deficiency   • Family history of colon cancer   • Acute bilateral low back pain with bilateral sciatica   • Herpes genitalis in women         Past Medical History     Past Medical History:   Diagnosis Date   • Anxiety    • Asthma     Last assessed 9/25/2014   • Colon polyp    • Constipation    • Eczema    • Irritable bowel syndrome (IBS)    • Seasonal allergies    • [place] teeth extracted          Surgical History     Past Surgical History:   Procedure Laterality Date   • COLONOSCOPY     • HYSTERECTOMY  2015   • TONSILLECTOMY AND ADENOIDECTOMY     • WISDOM TOOTH EXTRACTION           Family History     Family History   Problem Relation Age of Onset   • Hypertension Mother    • Colon cancer Father 50   • No Known Problems Sister    • No Known Problems Daughter    • No Known Problems Maternal Grandmother    • No Known Problems Maternal Grandfather    • No Known Problems Paternal Grandmother    • No Known Problems Paternal Grandfather    • No Known Problems Maternal Aunt          Social History     Social History       Radiology

## 2023-04-09 LAB — BACTERIA UR CULT: NORMAL

## 2023-04-26 DIAGNOSIS — L20.9 ATOPIC DERMATITIS, UNSPECIFIED TYPE: ICD-10-CM

## 2023-04-26 DIAGNOSIS — R35.0 URINARY FREQUENCY: ICD-10-CM

## 2023-04-26 RX ORDER — DUPILUMAB 300 MG/2ML
300 INJECTION, SOLUTION SUBCUTANEOUS
Qty: 6 ML | Refills: 5 | Status: SHIPPED | OUTPATIENT
Start: 2023-04-26 | End: 2024-04-25

## 2023-04-28 ENCOUNTER — OFFICE VISIT (OUTPATIENT)
Dept: GASTROENTEROLOGY | Facility: CLINIC | Age: 53
End: 2023-04-28

## 2023-04-28 VITALS
WEIGHT: 152 LBS | SYSTOLIC BLOOD PRESSURE: 112 MMHG | HEIGHT: 66 IN | TEMPERATURE: 97.3 F | BODY MASS INDEX: 24.43 KG/M2 | DIASTOLIC BLOOD PRESSURE: 72 MMHG

## 2023-04-28 DIAGNOSIS — K59.01 CONSTIPATION BY DELAYED COLONIC TRANSIT: Primary | ICD-10-CM

## 2023-04-28 DIAGNOSIS — Z80.0 FAMILY HISTORY OF COLON CANCER: ICD-10-CM

## 2023-04-28 DIAGNOSIS — Z86.010 PERSONAL HISTORY OF COLONIC POLYPS: ICD-10-CM

## 2023-04-28 DIAGNOSIS — R14.0 BLOATING: ICD-10-CM

## 2023-04-28 PROBLEM — Z86.0100 PERSONAL HISTORY OF COLONIC POLYPS: Status: ACTIVE | Noted: 2023-04-28

## 2023-04-28 NOTE — PROGRESS NOTES
[facility] - Outpatient Consultation  [facility] 47 y o  female MRN: [id number]  Encounter: [id number]          ASSESSMENT AND PLAN:        1  Constipation by delayed colonic transit  2  Bloating  3  Family history of colon cancer  4  Personal history of colonic polyps        70-year-old female with chronic constipation and abdominal bloating here to establish her care  She had significant stool burden in the colon on multiple imagings  Colonic transit study with Sitzmarks shows 16 retained markers on the left hemicolon  Fluoroscopic defecography shows small rectocele incomplete evacuation of contrast material from the rectum  Although she had normal fecal defecography,  her Sitzmarks study and history are suggestive of functional outlet obstruction  Prior treatments include Amitiza and Linzess without significant improvement in her symptoms  Now on Trulance but reports persistent constipation  She takes laxative and stool softener  I will switch Trulance to [address]  Ambulatory referral to pelvic floor physical therapy  Small intestine bacterial overgrowth test  She is due for surveillance colonoscopy  Schedule her for colonoscopy  Bowel prep for GoLytely given    ______________________________________________________________________    HPI: 70-year-old female with chronic constipation here to establish her care  She has been experiencing constipation for several years  She was previously managed by colorectal surgery team   Prior treatments include Amitiza and Linzess without significant improvement in her symptoms  He is currently on Trulance without complete resolution of her symptoms  She continues to have abdominal bloating, fullness and constipation  Stool consistency is hard  Sometimes she has to take laxative and stool softeners      She reports manual manipulation of perineal region to help with constipation  Prior studies  Colonic transit study with Sitzmarks -16 retained colonic transit markers in the left hemicolon  The study was performed utilizing single contrast with opacification of   the rectum  After placement of contrast into the rectum , images were obtained   following provocative maneuvers of coughing and Valsalva maneuver  Images were then obtained during active evacuation  Initial images demonstrate normal morphology, with appropriate   elevation of the pelvic floor  Anorectal angle is approximately 86? Wendall Dew During evacuation there is appropriate relaxation of the pelvic floor,   with anorectal angle of approximately 116? Wendall Dew There is a small anterior   rectocele  There is complete evacuation of contrast material from the rectum        She has family history of colon cancer  Her father had colon cancer in his 46s  She has been getting colonoscopy regularly  Last colonoscopy was in August 2020 -polyp larger than 10 mm removed      REVIEW OF SYSTEMS:    CONSTITUTIONAL: Denies any fever, chills, rigors, and weight loss  HEENT: No earache or tinnitus  Denies hearing loss or visual disturbances  CARDIOVASCULAR: No chest pain or palpitations  RESPIRATORY: Denies any cough, hemoptysis, shortness of breath or dyspnea on exertion  GASTROINTESTINAL: As noted in the History of Present Illness  GENITOURINARY: No problems with urination  Denies any hematuria or dysuria  NEUROLOGIC: No dizziness or vertigo, denies headaches  MUSCULOSKELETAL: Denies any muscle or joint pain  SKIN: Denies skin rashes or itching  ENDOCRINE: Denies excessive thirst  Denies intolerance to heat or cold  PSYCHOSOCIAL: Denies depression or anxiety  Denies any recent memory loss         Historical Information   Past Medical History:   Diagnosis Date   • Anxiety    • Asthma     Last assessed 9/25/2014   • Colon polyp    • Constipation    • Eczema    • Irritable bowel syndrome (IBS)    • Seasonal allergies    • [place] teeth extracted      Past Surgical History:   Procedure "Laterality Date   • COLONOSCOPY     • HYSTERECTOMY     • TONSILLECTOMY AND ADENOIDECTOMY     • WISDOM TOOTH EXTRACTION       Social History   Social History     Substance and Sexual Activity   Alcohol Use Yes   • Alcohol/week: 3 0 standard drinks   • Types: 3 Glasses of wine per week     Social History     Substance and Sexual Activity   Drug Use Never     Social History     Tobacco Use   Smoking Status Former   • Packs/day: 0 25   • Years: 10 00   • Pack years: 2 50   • Types: Cigarettes   • Start date: 25   • Quit date: 1993   • Years since quittin 7   Smokeless Tobacco Never     Family History   Problem Relation Age of Onset   • Hypertension Mother    • Colon cancer Father 50   • No Known Problems Sister    • No Known Problems Daughter    • No Known Problems Maternal Grandmother    • No Known Problems Maternal Grandfather    • No Known Problems Paternal Grandmother    • No Known Problems Paternal Grandfather    • No Known Problems Maternal Aunt        Meds/Allergies       Current Outpatient Medications:   •  ALPRAZolam (XANAX) 0 25 mg tablet  •  b complex vitamins capsule  •  B-D ALLERGY SYRINGE 1CC/28G 28G X 1/2\" 1 ML MISC  •  B-D ULTRAFINE III SHORT PEN 31G X 8 MM MISC  •  Cholecalciferol (VITAMIN D3) 2000 units capsule  •  citalopram (CeleXA) 40 mg tablet  •  cyclobenzaprine (FLEXERIL) 10 mg tablet  •  Dulera 100-5 MCG/ACT inhaler  •  Dupixent subcutaneous injection  •  fluticasone (FLONASE) 50 mcg/act nasal spray  •  gentamicin (GARAMYCIN) 0 3 % ophthalmic solution  •  INSULIN SYRINGE 1CC/29G 29G X 1/2\" 1 ML MISC  •  liraglutide (Saxenda) injection  •  Mirabegron ER 50 MG TB24  •  Plecanatide 3 MG TABS  •  Restasis 0 05 % ophthalmic emulsion  •  tobramycin-dexamethasone (TOBRADEX) ophthalmic suspension  •  topiramate (TOPAMAX) 100 mg tablet  •  valACYclovir (VALTREX) 500 mg tablet    No Known Allergies        Objective     Blood pressure 112/72, temperature (!) 97 3 °F (36 3 °C), temperature " "source Tympanic, height 5' 6\" (1 676 m), weight 68 9 kg (152 lb), not currently breastfeeding  Body mass index is 24 53 kg/m²  PHYSICAL EXAM:      General Appearance:   Alert, cooperative, no distress   HEENT:   Normocephalic, atraumatic, anicteric      Neck:  Supple, symmetrical, trachea midline   Lungs:   Clear to auscultation bilaterally; no rales, rhonchi or wheezing; respirations unlabored    Heart[de-identified]   Regular rate and rhythm; no murmur, rub, or gallop  Abdomen:   Soft, non-tender, non-distended; normal bowel sounds; no masses, no organomegaly    Genitalia:   Deferred    Rectal:   Deferred    Extremities:  No cyanosis, clubbing or edema    Pulses:  2+ and symmetric    Skin:  No jaundice, rashes, or lesions    Lymph nodes:  No palpable cervical lymphadenopathy        Lab Results:   No visits with results within 1 Day(s) from this visit     Latest known visit with results is:   Office Visit on 04/07/2023   Component Date Value   • LEUKOCYTE ESTERASE,UA 04/07/2023 neg    • NITRITE,UA 04/07/2023 neg    • SL AMB POCT UROBILINOGEN 04/07/2023 0 2    • POCT URINE PROTEIN 04/07/2023 neg    •  PH,UA 04/07/2023 5 0    • BLOOD,UA 04/07/2023 small    • SPECIFIC GRAVITY,UA 04/07/2023 1 020    • KETONES,UA 04/07/2023 neg    • BILIRUBIN,UA 04/07/2023 neg    • GLUCOSE, UA 04/07/2023 neg    •  COLOR,UA 04/07/2023 yellow    • CLARITY,UA 04/07/2023 clear    • Color, UA 04/07/2023 Colorless    • Clarity, UA 04/07/2023 Clear    • Specific Gravity, UA 04/07/2023 1 008    • pH, UA 04/07/2023 6 0    • Leukocytes, UA 04/07/2023 Negative    • Nitrite, UA 04/07/2023 Negative    • Protein, UA 04/07/2023 Negative    • Glucose, UA 04/07/2023 Negative    • Ketones, UA 04/07/2023 Negative    • Urobilinogen, UA 04/07/2023 <2 0    • Bilirubin, UA 04/07/2023 Negative    • Occult Blood, UA 04/07/2023 Negative    • RBC, UA 04/07/2023 1-2    • WBC, UA 04/07/2023 2-4 (A)    • Epithelial Cells 04/07/2023 Occasional    • Bacteria, UA 04/07/2023 " Occasional    • Urine Culture 04/07/2023 >100,000 cfu/ml          Radiology Results:   No results found

## 2023-04-28 NOTE — PATIENT INSTRUCTIONS
Scheduled date of colonoscopy (as of today):08 14 23  Physician performing colonoscopy:DR BRAVO  Location of colonoscopy:[facility]  Bowel prep reviewed with patient:WENDY  Instructions reviewed with patient by:GLORIA  Clearances:  N/A

## 2023-05-01 ENCOUNTER — DOCUMENTATION (OUTPATIENT)
Dept: GASTROENTEROLOGY | Facility: CLINIC | Age: 53
End: 2023-05-01

## 2023-05-05 ENCOUNTER — TELEPHONE (OUTPATIENT)
Dept: GASTROENTEROLOGY | Facility: CLINIC | Age: 53
End: 2023-05-05

## 2023-05-05 ENCOUNTER — TRANSCRIBE ORDERS (OUTPATIENT)
Dept: GASTROENTEROLOGY | Facility: CLINIC | Age: 53
End: 2023-05-05

## 2023-05-05 NOTE — TELEPHONE ENCOUNTER
Spoke with pt let her know PA was started on the 1st and we are waiting for ins to get back to us  Pt understands  Advised that I will let her know either way if medication is approved or denied

## 2023-05-05 NOTE — TELEPHONE ENCOUNTER
Patients GI provider:  Dr Jak Matthews    Number to return call: [phone]    Reason for call: Pt calling asking status on [employer] prior auth  Advised PA was started  Pt wants a call back with when will she be able to  medication      Scheduled procedure/appointment date if applicable: N/A

## 2023-05-05 NOTE — TELEPHONE ENCOUNTER
Called pharmacy to run medication and it went through as approved  Co pay was $124 and I gave coupon that was able to get it down to 34 12 for pt  She was very grateful and medication will be ready for  tomw 5/6/23  Pt is aware

## 2023-05-06 ENCOUNTER — APPOINTMENT (OUTPATIENT)
Dept: LAB | Facility: CLINIC | Age: 53
End: 2023-05-06

## 2023-05-06 DIAGNOSIS — Z00.8 HEALTH EXAMINATION IN POPULATION SURVEYS: ICD-10-CM

## 2023-05-06 LAB
CHOLEST SERPL-MCNC: 259 MG/DL
HDLC SERPL-MCNC: 103 MG/DL
LDLC SERPL CALC-MCNC: 144 MG/DL (ref 0–100)
NONHDLC SERPL-MCNC: 156 MG/DL
TRIGL SERPL-MCNC: 62 MG/DL

## 2023-05-07 LAB
EST. AVERAGE GLUCOSE BLD GHB EST-MCNC: 103 MG/DL
HBA1C MFR BLD: 5.2 %

## 2023-05-12 ENCOUNTER — TELEPHONE (OUTPATIENT)
Dept: GASTROENTEROLOGY | Facility: CLINIC | Age: 53
End: 2023-05-12

## 2023-05-12 NOTE — TELEPHONE ENCOUNTER
Pt returned call  She would like to know exactly why the Sibo test wasn't able to be completed as she said she was diligent in getting it done  She would like someone to call her back to explain what went wrong  Her phone number is [phone]

## 2023-05-12 NOTE — TELEPHONE ENCOUNTER
Left voicemail for patient to call office about her sibo test  The test was done incorrect and wasn't able to complete the test  She can  a new kit if she is willing to do the test again

## 2023-05-16 NOTE — TELEPHONE ENCOUNTER
Patient returned call, She understands the message and would like to go ahead with the Augmentin treatment  Please send to [id number] [facility]      If you need to speak with her [phone]

## 2023-05-19 ENCOUNTER — TELEPHONE (OUTPATIENT)
Dept: GASTROENTEROLOGY | Facility: CLINIC | Age: 53
End: 2023-05-19

## 2023-05-19 DIAGNOSIS — K63.89 SMALL INTESTINAL BACTERIAL OVERGROWTH (SIBO): Primary | ICD-10-CM

## 2023-05-19 RX ORDER — AMOXICILLIN AND CLAVULANATE POTASSIUM 875; 125 MG/1; MG/1
1 TABLET, FILM COATED ORAL EVERY 12 HOURS SCHEDULED
Qty: 20 TABLET | Refills: 0 | Status: SHIPPED | OUTPATIENT
Start: 2023-05-19 | End: 2023-05-29

## 2023-05-19 NOTE — TELEPHONE ENCOUNTER
Called pt and let her know that prescription was called in to [facility]

## 2023-05-19 NOTE — TELEPHONE ENCOUNTER
Patients GI provider:  Dr Lisbet Marcus    Number to return call: [phone]     Reason for call: Pt called in regards to RX Augmentin  She states that the script was supposed to be sent into her pharmacy but she has not received any notification that it's been filled  She is requesting the script be resent      Scheduled procedure/appointment date if applicable: 7/77/8125

## 2023-05-22 DIAGNOSIS — F32.A DEPRESSION, UNSPECIFIED DEPRESSION TYPE: ICD-10-CM

## 2023-05-22 DIAGNOSIS — R35.0 URINARY FREQUENCY: ICD-10-CM

## 2023-05-22 RX ORDER — CITALOPRAM 40 MG/1
40 TABLET ORAL DAILY
Qty: 90 TABLET | Refills: 0 | Status: SHIPPED | OUTPATIENT
Start: 2023-05-22

## 2023-06-03 DIAGNOSIS — E66.09 CLASS 1 OBESITY DUE TO EXCESS CALORIES WITHOUT SERIOUS COMORBIDITY WITH BODY MASS INDEX (BMI) OF 30.0 TO 30.9 IN ADULT: ICD-10-CM

## 2023-06-03 RX ORDER — LIRAGLUTIDE 6 MG/ML
INJECTION, SOLUTION SUBCUTANEOUS
Qty: 15 ML | Refills: 0 | Status: SHIPPED | OUTPATIENT
Start: 2023-06-03

## 2023-06-23 DIAGNOSIS — R35.0 URINARY FREQUENCY: ICD-10-CM

## 2023-07-05 DIAGNOSIS — E66.09 CLASS 1 OBESITY DUE TO EXCESS CALORIES WITHOUT SERIOUS COMORBIDITY WITH BODY MASS INDEX (BMI) OF 30.0 TO 30.9 IN ADULT: ICD-10-CM

## 2023-07-05 RX ORDER — LIRAGLUTIDE 6 MG/ML
INJECTION, SOLUTION SUBCUTANEOUS
Qty: 15 ML | Refills: 0 | Status: CANCELLED | OUTPATIENT
Start: 2023-07-05

## 2023-07-05 NOTE — TELEPHONE ENCOUNTER
Called and left a message for the patient to call the office back. She needs to be seen as it has been over a year. I need to know if she changed pharmacies, as I received a request for refills from Viviana ''R''  with the patient and verified that she is using [facility] as it is cheaper.  She is scheduled for a follow up visit with her provider next week

## 2023-07-07 DIAGNOSIS — E66.09 CLASS 1 OBESITY DUE TO EXCESS CALORIES WITHOUT SERIOUS COMORBIDITY WITH BODY MASS INDEX (BMI) OF 30.0 TO 30.9 IN ADULT: ICD-10-CM

## 2023-07-07 RX ORDER — LIRAGLUTIDE 6 MG/ML
INJECTION, SOLUTION SUBCUTANEOUS
Qty: 15 ML | Refills: 3 | Status: SHIPPED | OUTPATIENT
Start: 2023-07-07

## 2023-07-12 ENCOUNTER — PATIENT MESSAGE (OUTPATIENT)
Dept: GASTROENTEROLOGY | Facility: CLINIC | Age: 53
End: 2023-07-12

## 2023-07-19 DIAGNOSIS — K59.01 CONSTIPATION BY DELAYED COLONIC TRANSIT: Primary | ICD-10-CM

## 2023-07-21 ENCOUNTER — OFFICE VISIT (OUTPATIENT)
Dept: FAMILY MEDICINE CLINIC | Facility: CLINIC | Age: 53
End: 2023-07-21
Payer: COMMERCIAL

## 2023-07-21 VITALS
OXYGEN SATURATION: 99 % | SYSTOLIC BLOOD PRESSURE: 138 MMHG | TEMPERATURE: 98 F | DIASTOLIC BLOOD PRESSURE: 80 MMHG | RESPIRATION RATE: 16 BRPM | BODY MASS INDEX: 24.34 KG/M2 | HEART RATE: 79 BPM | WEIGHT: 150.8 LBS

## 2023-07-21 DIAGNOSIS — N95.1 PERIMENOPAUSE: Primary | ICD-10-CM

## 2023-07-21 DIAGNOSIS — B35.1 ONYCHOMYCOSIS: ICD-10-CM

## 2023-07-21 PROBLEM — R29.2 HYPERREFLEXIA: Status: RESOLVED | Noted: 2018-10-09 | Resolved: 2023-07-21

## 2023-07-21 PROBLEM — M89.8X8 MASS OF SKULL: Status: RESOLVED | Noted: 2018-11-06 | Resolved: 2023-07-21

## 2023-07-21 PROBLEM — R42 DIZZINESS: Status: RESOLVED | Noted: 2018-10-09 | Resolved: 2023-07-21

## 2023-07-21 PROBLEM — M54.42 ACUTE BILATERAL LOW BACK PAIN WITH BILATERAL SCIATICA: Status: RESOLVED | Noted: 2020-09-22 | Resolved: 2023-07-21

## 2023-07-21 PROBLEM — R14.0 BLOATING: Status: RESOLVED | Noted: 2023-04-28 | Resolved: 2023-07-21

## 2023-07-21 PROBLEM — M54.41 ACUTE BILATERAL LOW BACK PAIN WITH BILATERAL SCIATICA: Status: RESOLVED | Noted: 2020-09-22 | Resolved: 2023-07-21

## 2023-07-21 PROCEDURE — 99213 OFFICE O/P EST LOW 20 MIN: CPT | Performed by: NURSE PRACTITIONER

## 2023-07-21 RX ORDER — TERBINAFINE HYDROCHLORIDE 250 MG/1
250 TABLET ORAL DAILY
Qty: 30 TABLET | Refills: 2 | Status: SHIPPED | OUTPATIENT
Start: 2023-07-21 | End: 2023-10-19

## 2023-07-21 RX ORDER — ALBUTEROL SULFATE 90 UG/1
AEROSOL, METERED RESPIRATORY (INHALATION)
COMMUNITY
Start: 2023-07-10

## 2023-07-21 RX ORDER — TERBINAFINE HYDROCHLORIDE 250 MG/1
250 TABLET ORAL DAILY
Qty: 90 TABLET | Refills: 0 | Status: SHIPPED | OUTPATIENT
Start: 2023-07-21 | End: 2023-10-19

## 2023-07-21 NOTE — PROGRESS NOTES
FAMILY PRACTICE OFFICE VISIT       NAME: Jeana Burton  AGE: 47 y.o. SEX: female       : 1970        MRN: [id number]    DATE: 2023  TIME: 4:22 PM    Assessment and Plan   1. Perimenopause  -     Ambulatory Referral to Obstetrics / Gynecology; Future    2. Onychomycosis  -     terbinafine (LamISIL) 250 mg tablet; Take 1 tablet (250 mg total) by mouth daily  -     terbinafine (LamISIL) 250 mg tablet; Take 1 tablet (250 mg total) by mouth daily                 Chief Complaint     Chief Complaint   Patient presents with   • Follow-up     Toe nail fungus and menopause symptoms         History of Present Illness   Jeana Burton is a 47y.o.-year-old female who is here for menopausal symptoms  Having hot flashes  Started 400mg magnesium  menopausez    Lost 29 pounds on saxenda  Doing well on meds    Right great toenail fungus  Used lamisil oral in past with good results    Review of Systems   Review of Systems   Constitutional: Negative for fatigue and fever. HENT: Negative for congestion, postnasal drip and rhinorrhea. Eyes: Negative for photophobia and visual disturbance. Respiratory: Negative for cough and shortness of breath. Cardiovascular: Negative for chest pain and palpitations. Gastrointestinal: Negative for constipation, diarrhea, nausea and vomiting. Genitourinary: Positive for menstrual problem. Musculoskeletal: Negative for arthralgias and myalgias. Skin: Negative for rash. Neurological: Negative for dizziness, light-headedness and headaches. Hematological: Negative for adenopathy. Psychiatric/Behavioral: Negative for dysphoric mood and sleep disturbance. The patient is not nervous/anxious.         Active Problem List     Patient Active Problem List   Diagnosis   • Anxiety   • Migraine   • Depression   • Meningioma (HCC)   • Constipation by delayed colonic transit   • Allergic rhinitis due to pollen   • Nephrolithiasis   • Vitamin D deficiency   • Family history of colon cancer   • Herpes genitalis in women   • Personal history of colonic polyps   • Perimenopause   • Onychomycosis         Past Medical History:  Past Medical History:   Diagnosis Date   • Anxiety    • Asthma     Last assessed 2014   • Colon polyp    • Constipation    • Eczema    • Irritable bowel syndrome (IBS)    • Seasonal allergies    • [place] teeth extracted        Past Surgical History:  Past Surgical History:   Procedure Laterality Date   • COLONOSCOPY     • HYSTERECTOMY     • TONSILLECTOMY AND ADENOIDECTOMY     • WISDOM TOOTH EXTRACTION         Family History:  Family History   Problem Relation Age of Onset   • Hypertension Mother    • Colon cancer Father 50   • No Known Problems Sister    • No Known Problems Daughter    • No Known Problems Maternal Grandmother    • No Known Problems Maternal Grandfather    • No Known Problems Paternal Grandmother    • No Known Problems Paternal Grandfather    • No Known Problems Maternal Aunt        Social History:  Social History     Socioeconomic History   • Marital status: /Civil Union     Spouse name: Not on file   • Number of children: Not on file   • Years of education: Not on file   • Highest education level: Not on file   Occupational History   • Not on file   Tobacco Use   • Smoking status: Former     Packs/day: 0.25     Years: 10.00     Total pack years: 2.50     Types: Cigarettes     Start date:      Quit date: 1993     Years since quittin.9   • Smokeless tobacco: Never   Vaping Use   • Vaping Use: Never used   Substance and Sexual Activity   • Alcohol use:  Yes     Alcohol/week: 3.0 standard drinks of alcohol     Types: 3 Glasses of wine per week   • Drug use: Never   • Sexual activity: Yes     Partners: Male   Other Topics Concern   • Not on file   Social History Narrative   • Not on file     Social Determinants of Health     Financial Resource Strain: Not on file   Food Insecurity: Not on file   Transportation Needs: Not on file Physical Activity: Not on file   Stress: Not on file   Social Connections: Not on file   Intimate Partner Violence: Not on file   Housing Stability: Not on file       Objective     Vitals:    07/21/23 1429   BP: 138/80   Pulse: 79   Resp: 16   Temp: 98 °F (36.7 °C)   SpO2: 99%     Wt Readings from Last 3 Encounters:   07/21/23 68.4 kg (150 lb 12.8 oz)   04/28/23 68.9 kg (152 lb)   04/07/23 68.9 kg (152 lb)       Physical Exam  Vitals and nursing note reviewed. Constitutional:       Appearance: Normal appearance. HENT:      Head: Normocephalic and atraumatic. Eyes:      Conjunctiva/sclera: Conjunctivae normal.   Cardiovascular:      Rate and Rhythm: Normal rate and regular rhythm. Pulses: Normal pulses. Heart sounds: Normal heart sounds. Pulmonary:      Effort: Pulmonary effort is normal.      Breath sounds: Normal breath sounds. Abdominal:      General: Bowel sounds are normal.   Musculoskeletal:         General: Normal range of motion. Cervical back: Normal range of motion and neck supple. Skin:     General: Skin is warm. Capillary Refill: Capillary refill takes less than 2 seconds. Neurological:      General: No focal deficit present. Mental Status: She is alert and oriented to person, place, and time.    Psychiatric:         Mood and Affect: Mood normal.         Behavior: Behavior normal.         Pertinent Laboratory/Diagnostic Studies:  Lab Results   Component Value Date    GLUCOSE 98 04/25/2014    BUN 21 05/29/2022    CREATININE 1.02 05/29/2022    CALCIUM 9.7 05/29/2022     04/25/2014    K 3.7 05/29/2022    CO2 26 05/29/2022     05/29/2022     Lab Results   Component Value Date    ALT 17 05/29/2022    AST 21 05/29/2022    ALKPHOS 39 (L) 05/29/2022    BILITOT 0.50 04/25/2014       Lab Results   Component Value Date    WBC 6.07 05/29/2022    HGB 14.5 05/29/2022    HCT 43.6 05/29/2022    MCV 99 (H) 05/29/2022     05/29/2022       No results found for: "TSH"    Lab Results   Component Value Date    CHOL 232 06/26/2015     Lab Results   Component Value Date    TRIG 62 05/06/2023     Lab Results   Component Value Date     05/06/2023     Lab Results   Component Value Date    LDLCALC 144 (H) 05/06/2023     Lab Results   Component Value Date    HGBA1C 5.2 05/06/2023       Results for orders placed or performed in visit on 05/06/23   Lipid panel   Result Value Ref Range    Cholesterol 259 (H) See Comment mg/dL    Triglycerides 62 See Comment mg/dL    HDL, Direct 103 >=50 mg/dL    LDL Calculated 144 (H) 0 - 100 mg/dL    Non-HDL-Chol (CHOL-HDL) 156 mg/dl   Hemoglobin A1C   Result Value Ref Range    Hemoglobin A1C 5.2 Normal 3.8-5.6%; PreDiabetic 5.7-6.4%;  Diabetic >=6.5%; Glycemic control for adults with diabetes <7.0% %     mg/dl       Orders Placed This Encounter   Procedures   • Ambulatory Referral to Obstetrics / Gynecology       ALLERGIES:  No Known Allergies    Current Medications     Current Outpatient Medications   Medication Sig Dispense Refill   • ALPRAZolam (XANAX) 0.25 mg tablet Take 1 tablet (0.25 mg total) by mouth 3 (three) times a day as needed for anxiety 30 tablet 0   • b complex vitamins capsule Take 1 capsule by mouth daily     • B-D ALLERGY SYRINGE 1CC/28G 28G X 1/2" 1 ML MISC   2   • B-D ULTRAFINE III SHORT PEN 31G X 8 MM MISC USE AS DIRECTED 100 each 0   • Cholecalciferol (VITAMIN D3) 2000 units capsule Take 2 capsules by mouth daily       • citalopram (CeleXA) 40 mg tablet Take 1 tablet (40 mg total) by mouth daily 90 tablet 0   • cyclobenzaprine (FLEXERIL) 10 mg tablet TAKE ONE TABLET BY MOUTH 3 TIMES A DAY AS NEEDED FOR MUSCLE SPASMS 45 tablet 0   • Dulera 100-5 MCG/ACT inhaler      • Dupixent subcutaneous injection Inject 2 mL (300 mg total) under the skin every 21 days 6 mL 5   • fluticasone (FLONASE) 50 mcg/act nasal spray 2 sprays into each nostril daily     • gentamicin (GARAMYCIN) 0.3 % ophthalmic solution Administer 1 drop to both eyes 4 (four) times a day 5 mL 0   • INSULIN SYRINGE 1CC/29G 29G X 1/2" 1 ML MISC      • liraglutide (Saxenda) injection increase the dose by 0.6 mg each week until the full maintenance dose of 3 mg 15 mL 3   • Mirabegron ER 50 MG TB24 Take 1 tablet (50 mg total) by mouth in the morning 30 tablet 11   • Plecanatide 3 MG TABS Take 3 mg by mouth daily 90 tablet 3   • Prucalopride Succinate 2 MG TABS Take 2 mg by mouth daily 30 tablet 5   • Restasis 0.05 % ophthalmic emulsion      • Sodium Sulfate-Mag Sulfate-KCl 9406-498-590 MG TABS Take 12 tabs the night before the procedure and 12 on the day of the procedure. Please follow instruction 24 tablet 0   • terbinafine (LamISIL) 250 mg tablet Take 1 tablet (250 mg total) by mouth daily 30 tablet 2   • terbinafine (LamISIL) 250 mg tablet Take 1 tablet (250 mg total) by mouth daily 90 tablet 0   • tobramycin-dexamethasone (TOBRADEX) ophthalmic suspension      • topiramate (TOPAMAX) 100 mg tablet Take 1 tablet (100 mg total) by mouth daily 90 tablet 3   • polyethylene glycol (GOLYTELY) 4000 mL solution Take 4,000 mL by mouth once for 1 dose 4000 mL 0   • valACYclovir (VALTREX) 500 mg tablet Take 1 tablet (500 mg total) by mouth 2 (two) times a day 180 tablet 0   • Ventolin  (90 Base) MCG/ACT inhaler        No current facility-administered medications for this visit.          Health Maintenance     Health Maintenance   Topic Date Due   • Cervical Cancer Screening  01/02/2023   • Annual Physical  03/18/2023   • Colorectal Cancer Screening  08/14/2023   • Influenza Vaccine (1) 09/01/2023   • HIV Screening  03/18/2024 (Originally 11/7/1985)   • Hepatitis C Screening  04/18/2024 (Originally 1970)   • BMI: Adult  07/21/2024   • Breast Cancer Screening: Mammogram  01/09/2025   • DTaP,Tdap,and Td Vaccines (2 - Td or Tdap) 06/18/2031   • COVID-19 Vaccine  Completed   • Pneumococcal Vaccine: Pediatrics (0 to 5 Years) and At-Risk Patients (6 to 59 Years)  Aged Out   • HIB Vaccine  Aged Out   • IPV Vaccine  Aged Out   • Hepatitis A Vaccine  Aged Out   • Meningococcal ACWY Vaccine  Aged Out   • HPV Vaccine  Aged Out     Immunization History   Administered Date(s) Administered   • COVID-19 PFIZER VACCINE 0.3 ML IM 12/23/2020, 01/12/2021, 08/21/2021, 02/05/2022   • COVID-19 Pfizer Vac BIVALENT Santana-sucrose 12 Yr+ IM (BOOSTER ONLY) 10/27/2022, 10/27/2022   • H1N1, All Formulations 11/02/2009   • INFLUENZA 10/24/2019, 11/08/2020   • Influenza, seasonal, injectable 10/15/2013   • Influenza, seasonal, injectable, preservative free 10/24/2019   • Tdap 06/18/2021          ANIVAL Colindres

## 2023-07-25 DIAGNOSIS — K59.01 CONSTIPATION BY DELAYED COLONIC TRANSIT: ICD-10-CM

## 2023-07-31 ENCOUNTER — PATIENT MESSAGE (OUTPATIENT)
Dept: GASTROENTEROLOGY | Facility: CLINIC | Age: 53
End: 2023-07-31

## 2023-08-01 ENCOUNTER — TELEPHONE (OUTPATIENT)
Age: 53
End: 2023-08-01

## 2023-08-01 NOTE — TELEPHONE ENCOUNTER
Scheduled date of colonoscopy (as of today): 9/18/2023    Physician performing colonoscopy: Naima    Location of colonoscopy: [facility]    Bowel prep reviewed with patient: Miralax/Dulcolax    Instructions reviewed with patient by: Alisa WEBB    Pt has copy on hand    Clearances: None

## 2023-08-02 DIAGNOSIS — Z80.0 FAMILY HISTORY OF COLON CANCER: Primary | ICD-10-CM

## 2023-08-02 RX ORDER — SOD SULF/POT CHLORIDE/MAG SULF 1.479 G
TABLET ORAL
Qty: 24 TABLET | Refills: 0 | Status: SHIPPED | OUTPATIENT
Start: 2023-08-02 | End: 2023-09-18 | Stop reason: ALTCHOICE

## 2023-08-02 NOTE — TELEPHONE ENCOUNTER
From: Mauri Saenz  To: Dylon Barrios  Sent: 7/12/2023 11:44 AM EDT  Subject: Upcoming colonoscopy    Hello, is SuTab an option for my procedure? Thanks!

## 2023-08-11 ENCOUNTER — HOSPITAL ENCOUNTER (OUTPATIENT)
Dept: MAMMOGRAPHY | Facility: CLINIC | Age: 53
Discharge: HOME | End: 2023-08-11
Payer: COMMERCIAL

## 2023-08-11 DIAGNOSIS — R92.8 ABNORMAL FINDINGS ON DIAGNOSTIC IMAGING OF BREAST: ICD-10-CM

## 2023-08-11 PROCEDURE — G0279 TOMOSYNTHESIS, MAMMO: HCPCS

## 2023-08-11 PROCEDURE — 77065 DX MAMMO INCL CAD UNI: CPT

## 2023-08-23 DIAGNOSIS — Z13.820 ENCOUNTER FOR OSTEOPOROSIS SCREENING IN ASYMPTOMATIC POSTMENOPAUSAL PATIENT: Primary | ICD-10-CM

## 2023-08-23 DIAGNOSIS — Z78.0 ENCOUNTER FOR OSTEOPOROSIS SCREENING IN ASYMPTOMATIC POSTMENOPAUSAL PATIENT: Primary | ICD-10-CM

## 2023-08-29 DIAGNOSIS — F32.A DEPRESSION, UNSPECIFIED DEPRESSION TYPE: ICD-10-CM

## 2023-08-30 RX ORDER — CITALOPRAM 40 MG/1
40 TABLET ORAL DAILY
Qty: 90 TABLET | Refills: 0 | Status: SHIPPED | OUTPATIENT
Start: 2023-08-30

## 2023-09-18 ENCOUNTER — ANESTHESIA (OUTPATIENT)
Dept: GASTROENTEROLOGY | Facility: AMBULARY SURGERY CENTER | Age: 53
End: 2023-09-18

## 2023-09-18 ENCOUNTER — HOSPITAL ENCOUNTER (OUTPATIENT)
Dept: GASTROENTEROLOGY | Facility: AMBULARY SURGERY CENTER | Age: 53
Setting detail: OUTPATIENT SURGERY
Discharge: HOME | End: 2023-09-18
Attending: INTERNAL MEDICINE
Payer: COMMERCIAL

## 2023-09-18 ENCOUNTER — ANESTHESIA EVENT (OUTPATIENT)
Dept: GASTROENTEROLOGY | Facility: AMBULARY SURGERY CENTER | Age: 53
End: 2023-09-18

## 2023-09-18 VITALS
WEIGHT: 148 LBS | HEIGHT: 65 IN | BODY MASS INDEX: 24.66 KG/M2 | RESPIRATION RATE: 22 BRPM | SYSTOLIC BLOOD PRESSURE: 122 MMHG | HEART RATE: 76 BPM | TEMPERATURE: 98.2 F | OXYGEN SATURATION: 100 % | DIASTOLIC BLOOD PRESSURE: 88 MMHG

## 2023-09-18 DIAGNOSIS — K59.01 CONSTIPATION BY DELAYED COLONIC TRANSIT: ICD-10-CM

## 2023-09-18 PROCEDURE — 88305 TISSUE EXAM BY PATHOLOGIST: CPT | Performed by: PATHOLOGY

## 2023-09-18 RX ORDER — PROPOFOL 10 MG/ML
INJECTION, EMULSION INTRAVENOUS AS NEEDED
Status: DISCONTINUED | OUTPATIENT
Start: 2023-09-18 | End: 2023-09-18

## 2023-09-18 RX ORDER — LIDOCAINE HYDROCHLORIDE 20 MG/ML
INJECTION, SOLUTION EPIDURAL; INFILTRATION; INTRACAUDAL; PERINEURAL AS NEEDED
Status: DISCONTINUED | OUTPATIENT
Start: 2023-09-18 | End: 2023-09-18

## 2023-09-18 RX ORDER — SODIUM CHLORIDE, SODIUM LACTATE, POTASSIUM CHLORIDE, CALCIUM CHLORIDE 600; 310; 30; 20 MG/100ML; MG/100ML; MG/100ML; MG/100ML
INJECTION, SOLUTION INTRAVENOUS CONTINUOUS PRN
Status: DISCONTINUED | OUTPATIENT
Start: 2023-09-18 | End: 2023-09-18

## 2023-09-18 RX ADMIN — PROPOFOL 50 MG: 10 INJECTION, EMULSION INTRAVENOUS at 09:16

## 2023-09-18 RX ADMIN — SODIUM CHLORIDE, SODIUM LACTATE, POTASSIUM CHLORIDE, AND CALCIUM CHLORIDE: .6; .31; .03; .02 INJECTION, SOLUTION INTRAVENOUS at 09:13

## 2023-09-18 RX ADMIN — PROPOFOL 50 MG: 10 INJECTION, EMULSION INTRAVENOUS at 09:30

## 2023-09-18 RX ADMIN — PROPOFOL 50 MG: 10 INJECTION, EMULSION INTRAVENOUS at 09:26

## 2023-09-18 RX ADMIN — PROPOFOL 100 MG: 10 INJECTION, EMULSION INTRAVENOUS at 09:13

## 2023-09-18 RX ADMIN — PROPOFOL 50 MG: 10 INJECTION, EMULSION INTRAVENOUS at 09:34

## 2023-09-18 RX ADMIN — LIDOCAINE HYDROCHLORIDE 100 MG: 20 INJECTION, SOLUTION EPIDURAL; INFILTRATION; INTRACAUDAL; PERINEURAL at 09:13

## 2023-09-18 RX ADMIN — PROPOFOL 50 MG: 10 INJECTION, EMULSION INTRAVENOUS at 09:21

## 2023-09-18 NOTE — H&P
History and Physical - [facility]  Ines Trejo 47 y.o. female MRN: [id number]    HPI: Ines Trejo is a 47y.o. year old female who presents for colonoscopy she had personal history of colon polyps      Review of Systems    Historical Information   Past Medical History:   Diagnosis Date   • Anxiety    • Asthma     Last assessed 2014   • Colon polyp    • Constipation    • Eczema    • Irritable bowel syndrome (IBS)    • Seasonal allergies    • [place] teeth extracted      Past Surgical History:   Procedure Laterality Date   • COLONOSCOPY     • HYSTERECTOMY     • TONSILLECTOMY AND ADENOIDECTOMY     • WISDOM TOOTH EXTRACTION       Social History   Social History     Substance and Sexual Activity   Alcohol Use Yes   • Alcohol/week: 3.0 standard drinks of alcohol   • Types: 3 Glasses of wine per week    Comment: Social     Social History     Substance and Sexual Activity   Drug Use Never     Social History     Tobacco Use   Smoking Status Former   • Packs/day: 0.25   • Years: 10.00   • Total pack years: 2.50   • Types: Cigarettes   • Start date:    • Quit date: 1993   • Years since quittin.1   Smokeless Tobacco Never     Family History   Problem Relation Age of Onset   • Hypertension Mother    • Colon cancer Father 50   • No Known Problems Sister    • No Known Problems Daughter    • No Known Problems Maternal Grandmother    • No Known Problems Maternal Grandfather    • No Known Problems Paternal Grandmother    • No Known Problems Paternal Grandfather    • No Known Problems Maternal Aunt        Meds/Allergies     (Not in a hospital admission)      No Known Allergies    Objective     /85   Pulse 82   Temp 97.5 °F (36.4 °C) (Temporal)   Resp 16   Ht 5' 5" (1.651 m)   Wt 67.1 kg (148 lb)   SpO2 98%   BMI 24.63 kg/m²       PHYSICAL EXAM    Gen: NAD  CV: RRR  CHEST: Clear  ABD: soft, NT/ND  EXT: no edema  Neuro: AAO      ASSESSMENT/PLAN:  This is a 47y.o. year old female here for colonoscopy for evaluation of abdominal bloating and personal history of colon polyps    PLAN:   Procedure: Colonoscopy with biopsy and possible polypectomy

## 2023-09-18 NOTE — ANESTHESIA POSTPROCEDURE EVALUATION
Post-Op Assessment Note    CV Status:  Stable  Pain Score: 0    Pain management: adequate     Mental Status:  Alert and awake   Hydration Status:  Euvolemic   PONV Controlled:  Controlled   Airway Patency:  Patent      Post Op Vitals Reviewed: Yes      Staff: Anesthesiologist, CRNA         There were no known notable events for this encounter.     BP      Temp      Pulse     Resp      SpO2

## 2023-09-18 NOTE — ANESTHESIA PREPROCEDURE EVALUATION
Procedure:  COLONOSCOPY    Relevant Problems   CARDIO   (+) Migraine      /RENAL   (+) Nephrolithiasis      NEURO/PSYCH   (+) Anxiety   (+) Depression   (+) Migraine    asthma    Physical Exam    Airway    Mallampati score: I  TM Distance: >3 FB  Neck ROM: full     Dental       Cardiovascular  Cardiovascular exam normal    Pulmonary  Pulmonary exam normal     Other Findings        Anesthesia Plan  ASA Score- 3     Anesthesia Type- IV sedation with anesthesia with ASA Monitors. Additional Monitors:   Airway Plan:     Comment: Last time on saxenda 9/17, no symptoms, gastric ultrasound negative for food bolus. Plan Factors-Exercise tolerance (METS): >4 METS. Chart reviewed. EKG reviewed. Existing labs reviewed. Patient summary reviewed. Patient is not a current smoker. Patient did not smoke on day of surgery. Obstructive sleep apnea risk education given perioperatively. Induction- intravenous. Postoperative Plan- Plan for postoperative opioid use. Informed Consent- Anesthetic plan and risks discussed with patient. I personally reviewed this patient with the CRNA. Discussed and agreed on the Anesthesia Plan with the CRNA. Sintia Guerrero

## 2023-09-19 DIAGNOSIS — E66.09 CLASS 1 OBESITY DUE TO EXCESS CALORIES WITHOUT SERIOUS COMORBIDITY WITH BODY MASS INDEX (BMI) OF 30.0 TO 30.9 IN ADULT: ICD-10-CM

## 2023-09-20 PROCEDURE — 88305 TISSUE EXAM BY PATHOLOGIST: CPT | Performed by: PATHOLOGY

## 2023-09-20 RX ORDER — LIRAGLUTIDE 6 MG/ML
INJECTION, SOLUTION SUBCUTANEOUS
Qty: 15 ML | Refills: 0 | Status: SHIPPED | OUTPATIENT
Start: 2023-09-20

## 2023-09-20 RX ORDER — PEN NEEDLE, DIABETIC 31 GX5/16"
NEEDLE, DISPOSABLE MISCELLANEOUS WEEKLY
Qty: 100 EACH | Refills: 0 | Status: SHIPPED | OUTPATIENT
Start: 2023-09-20

## 2023-09-28 ENCOUNTER — CONSULT (OUTPATIENT)
Dept: PLASTIC SURGERY | Facility: CLINIC | Age: 53
End: 2023-09-28
Payer: COMMERCIAL

## 2023-09-28 VITALS
BODY MASS INDEX: 24.66 KG/M2 | WEIGHT: 148 LBS | DIASTOLIC BLOOD PRESSURE: 72 MMHG | SYSTOLIC BLOOD PRESSURE: 122 MMHG | HEIGHT: 65 IN

## 2023-09-28 DIAGNOSIS — H02.834 DERMATOCHALASIS OF BOTH UPPER EYELIDS: Primary | ICD-10-CM

## 2023-09-28 DIAGNOSIS — H02.831 DERMATOCHALASIS OF BOTH UPPER EYELIDS: Primary | ICD-10-CM

## 2023-09-28 PROCEDURE — 99204 OFFICE O/P NEW MOD 45 MIN: CPT | Performed by: STUDENT IN AN ORGANIZED HEALTH CARE EDUCATION/TRAINING PROGRAM

## 2023-09-28 NOTE — PROGRESS NOTES
Assessment and Plan:  Ms. Lake Mckeon is a 47 y.o. female presenting with b/l dermatochalasis of upper eyelids    We discussed the procedure, risks, benefits, alternatives and postoperative expectations and instructions. Patient will have VF testing performed. Booking form created. History of Present Illness:   Ms. Lake Mckeon is a 47 y.o. female presenting with b/l upper lid dermatochalasis. Has not yet had visual fields performed. Does report lateral skin has been worsening over the past few months to years and she experiences significant afternoon and night heaviness. She does have eczema and is on therapy for it (dupixent). She does report some dry eye symptoms. Denies nicotine use      Review of Systems:  A 12 point ROS was performed and negative except per HPI. Past Medical History:  Past Medical History:   Diagnosis Date   • Anxiety    • Asthma     Last assessed 2014   • Colon polyp    • Constipation    • Eczema    • Irritable bowel syndrome (IBS)    • Seasonal allergies    • [place] teeth extracted        Past Surgical History:  Past Surgical History:   Procedure Laterality Date   • COLONOSCOPY     • HYSTERECTOMY     • TONSILLECTOMY AND ADENOIDECTOMY     • WISDOM TOOTH EXTRACTION         Social History:  Social History     Tobacco Use   • Smoking status: Former     Packs/day: 0.25     Years: 10.00     Total pack years: 2.50     Types: Cigarettes     Start date:      Quit date: 1993     Years since quittin.1   • Smokeless tobacco: Never   Vaping Use   • Vaping Use: Never used   Substance Use Topics   • Alcohol use:  Yes     Alcohol/week: 3.0 standard drinks of alcohol     Types: 3 Glasses of wine per week     Comment: Social   • Drug use: Never       Family History:  Family History   Problem Relation Age of Onset   • Hypertension Mother    • Colon cancer Father 50   • No Known Problems Sister    • No Known Problems Daughter    • No Known Problems Maternal Grandmother    • No Known Problems Maternal Grandfather    • No Known Problems Paternal Grandmother    • No Known Problems Paternal Grandfather    • No Known Problems Maternal Aunt        Allergies:  No Known Allergies    Medications:  Current Outpatient Medications on File Prior to Visit   Medication Sig Dispense Refill   • ALPRAZolam (XANAX) 0.25 mg tablet Take 1 tablet (0.25 mg total) by mouth 3 (three) times a day as needed for anxiety 30 tablet 0   • b complex vitamins capsule Take 1 capsule by mouth daily     • B-D ALLERGY SYRINGE 1CC/28G 28G X 1/2" 1 ML MISC   2   • Cholecalciferol (VITAMIN D3) 2000 units capsule Take 2 capsules by mouth daily       • citalopram (CeleXA) 40 mg tablet Take 1 tablet (40 mg total) by mouth daily 90 tablet 0   • cyclobenzaprine (FLEXERIL) 10 mg tablet TAKE ONE TABLET BY MOUTH 3 TIMES A DAY AS NEEDED FOR MUSCLE SPASMS 45 tablet 0   • Dulera 100-5 MCG/ACT inhaler      • Dupixent subcutaneous injection Inject 2 mL (300 mg total) under the skin every 21 days 6 mL 5   • fluticasone (FLONASE) 50 mcg/act nasal spray 2 sprays into each nostril daily     • gentamicin (GARAMYCIN) 0.3 % ophthalmic solution Administer 1 drop to both eyes 4 (four) times a day 5 mL 0   • Insulin Pen Needle (B-D ULTRAFINE III SHORT PEN) 31G X 8 MM MISC Inject under the skin once a week Use as directed 100 each 0   • INSULIN SYRINGE 1CC/29G 29G X 1/2" 1 ML MISC      • liraglutide (Saxenda) injection increase the dose by 0.6 mg each week until the full maintenance dose of 3 mg 15 mL 0   • Mirabegron ER 50 MG TB24 Take 1 tablet (50 mg total) by mouth in the morning 30 tablet 11   • Plecanatide 3 MG TABS Take 3 mg by mouth daily 90 tablet 3   • Prucalopride Succinate 2 MG TABS Take 2 mg by mouth daily 90 tablet 3   • Restasis 0.05 % ophthalmic emulsion      • terbinafine (LamISIL) 250 mg tablet Take 1 tablet (250 mg total) by mouth daily 30 tablet 2   • terbinafine (LamISIL) 250 mg tablet Take 1 tablet (250 mg total) by mouth daily 90 tablet 0   • tobramycin-dexamethasone (TOBRADEX) ophthalmic suspension      • valACYclovir (VALTREX) 500 mg tablet Take 1 tablet (500 mg total) by mouth 2 (two) times a day 180 tablet 0   • Ventolin  (90 Base) MCG/ACT inhaler        No current facility-administered medications on file prior to visit. Physical Examination:  /72   Ht 5' 5" (1.651 m)   Wt 67.1 kg (148 lb)   BMI 24.63 kg/m²   Estimated body mass index is 24.63 kg/m² as calculated from the following:    Height as of this encounter: 5' 5" (1.651 m). Weight as of this encounter: 67.1 kg (148 lb). General: NAD, well appearing, AAOx3  HEENT: NCAT, EOMI, MMM, supple  Resp: Nonlabored  Heart: RRR  Abdomen: Soft, ND, NT  Extremities/MSK: no LE edema, no obvious deficits in ROM  Neuro: grossly intact with no obvious deficits  Skin: no obvious lesions or rashes    Eyes: brow position at level/just above SOR, b/l dermatochalsis - worse laterally, b/l lower lid dermatochalasis      Soraya German, DO  Plastic and Reconstructive Surgery

## 2023-09-30 DIAGNOSIS — J45.20 MILD INTERMITTENT ASTHMA WITHOUT COMPLICATION: Primary | ICD-10-CM

## 2023-10-02 ENCOUNTER — TELEPHONE (OUTPATIENT)
Dept: PLASTIC SURGERY | Facility: CLINIC | Age: 53
End: 2023-10-02

## 2023-10-02 NOTE — TELEPHONE ENCOUNTER
Called and left message for patient to let me to know when she's scheduled for her visual field test. I let her know that once she has the results I can call the doctors office and have them fax the results over and send the results and the LMN to her insurance for the upper bleph.

## 2023-10-03 DIAGNOSIS — E66.3 OVERWEIGHT: Primary | ICD-10-CM

## 2023-10-03 RX ORDER — MOMETASONE FUROATE AND FORMOTEROL FUMARATE DIHYDRATE 100; 5 UG/1; UG/1
2 AEROSOL RESPIRATORY (INHALATION) 2 TIMES DAILY
Qty: 13 G | Refills: 3 | Status: SHIPPED | OUTPATIENT
Start: 2023-10-03

## 2023-10-17 ENCOUNTER — TELEMEDICINE (OUTPATIENT)
Dept: OBGYN CLINIC | Facility: CLINIC | Age: 53
End: 2023-10-17
Payer: COMMERCIAL

## 2023-10-17 DIAGNOSIS — N95.1 PERIMENOPAUSE: ICD-10-CM

## 2023-10-17 DIAGNOSIS — N95.1 MENOPAUSAL SYMPTOMS: Primary | ICD-10-CM

## 2023-10-17 PROCEDURE — 99205 OFFICE O/P NEW HI 60 MIN: CPT | Performed by: OBSTETRICS & GYNECOLOGY

## 2023-10-17 RX ORDER — PROGESTERONE 100 MG/1
100 CAPSULE ORAL
Qty: 30 CAPSULE | Refills: 11 | Status: SHIPPED | OUTPATIENT
Start: 2023-10-17

## 2023-10-17 RX ORDER — ESTRADIOL 1 MG/1
1 TABLET ORAL DAILY
Qty: 30 TABLET | Refills: 11 | Status: SHIPPED | OUTPATIENT
Start: 2023-10-17

## 2023-10-25 DIAGNOSIS — E66.3 OVERWEIGHT: ICD-10-CM

## 2023-10-26 ENCOUNTER — HOSPITAL ENCOUNTER (OUTPATIENT)
Dept: RADIOLOGY | Facility: IMAGING CENTER | Age: 53
Discharge: HOME | End: 2023-10-26
Payer: COMMERCIAL

## 2023-10-26 ENCOUNTER — TELEPHONE (OUTPATIENT)
Dept: FAMILY MEDICINE CLINIC | Facility: CLINIC | Age: 53
End: 2023-10-26

## 2023-10-26 DIAGNOSIS — Z78.0 ENCOUNTER FOR OSTEOPOROSIS SCREENING IN ASYMPTOMATIC POSTMENOPAUSAL PATIENT: ICD-10-CM

## 2023-10-26 DIAGNOSIS — Z13.820 ENCOUNTER FOR OSTEOPOROSIS SCREENING IN ASYMPTOMATIC POSTMENOPAUSAL PATIENT: ICD-10-CM

## 2023-10-26 PROCEDURE — 77080 DXA BONE DENSITY AXIAL: CPT

## 2023-10-26 NOTE — TELEPHONE ENCOUNTER
Patient needed prior authorization for [facility]RUFUS EVANS 1mg/0.5mL     Key Code: [id number]    Please initiate prior auth

## 2023-10-26 NOTE — PROGRESS NOTES
Virtual Regular Visit    Verification of patient location:    Patient is located at Home in the following state in which I hold an active license [place]      Assessment/Plan:    Problem List Items Addressed This Visit          Other    Perimenopause     Other Visit Diagnoses       Menopausal symptoms    -  Primary    Relevant Medications    estradiol (Estrace) 1 mg tablet    Progesterone 100 MG CAPS          1) Controversies surrounding estrogen based HRT discussed, including the fear based off the [facility] trials concerning Prempro. I will not be prescribing Prempro. One should not assume all therapies confer the same risk or benefit. Stay tuned [facility] the [place] [facility], but European trials with same medication point to safety and efficacy with superior health outcomes with women on this type of HRT. 2) I discussed the long term health benefits of E2/PG, including: reduction of CVD risk, reduction of hyperlipidemia, reduction of DM and GLP-1 agonist activity with mild appetite suppression; reduction of colon CA, osteoporosis and cognitive decline, Alzheimer's disease. 3) I suspect she is menopausal given her age no blood work needed. 4) She would like to try HRT. Start with oral E2 1 mg/ mg nightly. Side effects of HRT reviewed including vaginal bleeding, breast tenderness and somnolence, easily remedied with dose adjustment. 5) Email me with follow up in one month as to progress. Follow  up prn.  6) supplements are fine to take concurrently. NO additions needed at this point. This was a 60 minute visit with greater than 50% of time spent in face to face counseling and coordination of care.          Reason for visit is   Chief Complaint   Patient presents with    Virtual Regular Visit          Encounter provider Taylor Waterman MD    Provider located at [address] [facility] [place] [address] [phone]      Recent Visits  No visits were found meeting these conditions. Showing recent visits within past 7 days and meeting all other requirements  Future Appointments  No visits were found meeting these conditions. Showing future appointments within next 150 days and meeting all other requirements       The patient was identified by name and date of birth. Dakota Bonner was informed that this is a telemedicine visit and that the visit is being conducted through the [employer]. She agrees to proceed. .  My office door was closed. No one else was in the room. She acknowledged consent and understanding of privacy and security of the video platform. The patient has agreed to participate and understands they can discontinue the visit at any time. Patient is aware this is a billable service. Rudolph Brooks presents for hormonal consult, her first visit with me. 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 Makayla Holland is s/p TLH 10 years ago for AUB. Menopausal status not totally confirmed. She complains of:   1) Hot flashes, night sweats  2) Mild sleep disturbance. Very interested in learning more about hormone therapies. Takes many supplements including: Mag Complex, Vit D 5000 IU; turmeric; Super B complex, probiotic  PMXH: eczema, on Dupixent; slow colon transit. SHX: as above: denies tobacco, drug  FHX: Mom alive at 80, RA, HTN, lipid. MGM alopecia. MGF AMI. Dad Colon Ca age 46. PGP COD old age. 1 sister unknown health status. 1 daughter, 21, eczema.         Past Medical History:   Diagnosis Date    Anxiety     Asthma     Last assessed 9/25/2014    Colon polyp     Constipation     Eczema     Irritable bowel syndrome (IBS)     Seasonal allergies     [place] teeth extracted        Past Surgical History:   Procedure Laterality Date    COLONOSCOPY      HYSTERECTOMY  2015    TONSILLECTOMY AND ADENOIDECTOMY      WISDOM TOOTH EXTRACTION         Current Outpatient Medications   Medication Sig Dispense Refill    estradiol (Estrace) 1 mg tablet Take 1 tablet (1 mg total) by mouth daily 30 tablet 11    Progesterone 100 MG CAPS Take 100 mg by mouth at bedtime 30 capsule 11    ALPRAZolam (XANAX) 0.25 mg tablet Take 1 tablet (0.25 mg total) by mouth 3 (three) times a day as needed for anxiety 30 tablet 0    b complex vitamins capsule Take 1 capsule by mouth daily      B-D ALLERGY SYRINGE 1CC/28G 28G X 1/2" 1 ML MISC   2    Cholecalciferol (VITAMIN D3) 2000 units capsule Take 2 capsules by mouth daily        citalopram (CeleXA) 40 mg tablet Take 1 tablet (40 mg total) by mouth daily 90 tablet 0    cyclobenzaprine (FLEXERIL) 10 mg tablet TAKE ONE TABLET BY MOUTH 3 TIMES A DAY AS NEEDED FOR MUSCLE SPASMS 45 tablet 0    Dulera 100-5 MCG/ACT inhaler Inhale 2 puffs 2 (two) times a day 13 g 3    Dupixent subcutaneous injection Inject 2 mL (300 mg total) under the skin every 21 days 6 mL 5    fluticasone (FLONASE) 50 mcg/act nasal spray 2 sprays into each nostril daily      gentamicin (GARAMYCIN) 0.3 % ophthalmic solution Administer 1 drop to both eyes 4 (four) times a day 5 mL 0    Insulin Pen Needle (B-D ULTRAFINE III SHORT PEN) 31G X 8 MM MISC Inject under the skin once a week Use as directed 100 each 0    INSULIN SYRINGE 1CC/29G 29G X 1/2" 1 ML MISC       liraglutide (Saxenda) injection increase the dose by 0.6 mg each week until the full maintenance dose of 3 mg 15 mL 0    Mirabegron ER 50 MG TB24 Take 1 tablet (50 mg total) by mouth in the morning 30 tablet 11    Plecanatide 3 MG TABS Take 3 mg by mouth daily 90 tablet 3    Prucalopride Succinate 2 MG TABS Take 2 mg by mouth daily 90 tablet 3    Restasis 0.05 % ophthalmic emulsion       Semaglutide-Weight Management (WEGOVY) 1 MG/0.5ML Inject 0.5 mL (1 mg total) under the skin once a week 2 mL 0    tobramycin-dexamethasone (TOBRADEX) ophthalmic suspension valACYclovir (VALTREX) 500 mg tablet Take 1 tablet (500 mg total) by mouth 2 (two) times a day 180 tablet 0    Ventolin  (90 Base) MCG/ACT inhaler        No current facility-administered medications for this visit. No Known Allergies    Review of Systems   Constitutional: Negative. Gastrointestinal:  Positive for constipation. Endocrine: Positive for heat intolerance. Genitourinary: Negative. Musculoskeletal:  Positive for arthralgias. Skin:  Positive for rash. Neurological: Negative. Psychiatric/Behavioral: Negative. Video Exam    There were no vitals filed for this visit.     Physical Exam

## 2023-10-27 ENCOUNTER — TELEPHONE (OUTPATIENT)
Age: 53
End: 2023-10-27

## 2023-10-27 NOTE — TELEPHONE ENCOUNTER
Received call from Transfer from [facility] since the patient is restarting this therapy. Transfer had multiple questions, which RN has started to document, but since it was =numerous, she will refax form for responses from PCP. Sample of winformation that [facility] is requesting:  Does patient have diagnosis of obesitiy confirmed by BMI=30 kbgper meter squared  Does patient have BMi=27 per kg per meter squared     Please look out for fax to be completed. Thank you.

## 2023-10-28 DIAGNOSIS — E66.3 OVERWEIGHT: ICD-10-CM

## 2023-10-30 NOTE — TELEPHONE ENCOUNTER
I didn't think patient was restarting the med, I thought she was continuing the med  Can we clarify this? ??   Mariusz Mendoza

## 2023-10-31 ENCOUNTER — PREP FOR PROCEDURE (OUTPATIENT)
Dept: PLASTIC SURGERY | Facility: CLINIC | Age: 53
End: 2023-10-31

## 2023-10-31 DIAGNOSIS — H02.834 DERMATOCHALASIS OF BOTH UPPER EYELIDS: Primary | ICD-10-CM

## 2023-10-31 DIAGNOSIS — H02.831 DERMATOCHALASIS OF BOTH UPPER EYELIDS: Primary | ICD-10-CM

## 2023-11-01 DIAGNOSIS — E66.09 CLASS 1 OBESITY DUE TO EXCESS CALORIES WITHOUT SERIOUS COMORBIDITY WITH BODY MASS INDEX (BMI) OF 30.0 TO 30.9 IN ADULT: ICD-10-CM

## 2023-11-01 RX ORDER — LIRAGLUTIDE 6 MG/ML
INJECTION, SOLUTION SUBCUTANEOUS
Qty: 15 ML | Refills: 0 | Status: CANCELLED | OUTPATIENT
Start: 2023-11-01

## 2023-11-01 NOTE — TELEPHONE ENCOUNTER
Called patient to get clarification as to what medication she was in need off whether it was saxenda or wegovy. Pt stated that this point she will take what she can get because it is hard to get the medication anywhere because its on back order. After talking over with the provider it was explained to pt that she cannot get approved for the wegovy because she does not meet the criteria but she can continue on the saxenda. Pt is understanding and is asking for a refill for saxenda.

## 2023-11-02 DIAGNOSIS — E66.09 CLASS 1 OBESITY DUE TO EXCESS CALORIES WITHOUT SERIOUS COMORBIDITY WITH BODY MASS INDEX (BMI) OF 30.0 TO 30.9 IN ADULT: ICD-10-CM

## 2023-11-02 RX ORDER — LIRAGLUTIDE 6 MG/ML
INJECTION, SOLUTION SUBCUTANEOUS
Qty: 15 ML | Refills: 0 | Status: SHIPPED | OUTPATIENT
Start: 2023-11-02 | End: 2023-12-04 | Stop reason: SDUPTHER

## 2023-11-07 DIAGNOSIS — N95.1 MENOPAUSAL SYMPTOMS: ICD-10-CM

## 2023-11-07 RX ORDER — ESTRADIOL 1 MG/1
1 TABLET ORAL DAILY
Qty: 90 TABLET | Refills: 4 | Status: SHIPPED | OUTPATIENT
Start: 2023-11-07

## 2023-11-08 ENCOUNTER — TELEPHONE (OUTPATIENT)
Dept: FAMILY MEDICINE CLINIC | Facility: CLINIC | Age: 53
End: 2023-11-08

## 2023-11-21 ENCOUNTER — OFFICE VISIT (OUTPATIENT)
Dept: FAMILY MEDICINE CLINIC | Facility: CLINIC | Age: 53
End: 2023-11-21
Payer: COMMERCIAL

## 2023-11-21 VITALS
HEIGHT: 65 IN | SYSTOLIC BLOOD PRESSURE: 114 MMHG | BODY MASS INDEX: 25.43 KG/M2 | RESPIRATION RATE: 16 BRPM | HEART RATE: 52 BPM | TEMPERATURE: 97.6 F | WEIGHT: 152.6 LBS | DIASTOLIC BLOOD PRESSURE: 84 MMHG | OXYGEN SATURATION: 100 %

## 2023-11-21 DIAGNOSIS — M75.22 BICEPS TENDINITIS OF LEFT SHOULDER: ICD-10-CM

## 2023-11-21 DIAGNOSIS — M77.12 EPICONDYLITIS, LATERAL, LEFT: Primary | ICD-10-CM

## 2023-11-21 PROCEDURE — 99214 OFFICE O/P EST MOD 30 MIN: CPT | Performed by: FAMILY MEDICINE

## 2023-11-21 RX ORDER — METHYLPREDNISOLONE 4 MG/1
TABLET ORAL
Qty: 21 EACH | Refills: 0 | Status: SHIPPED | OUTPATIENT
Start: 2023-11-21

## 2023-11-21 NOTE — PROGRESS NOTES
Assessment/Plan:         Problem List Items Addressed This Visit    None  Visit Diagnoses       Epicondylitis, lateral, left    -  Primary    L lateral forearm and left shoulder pain x 1 month. Neurovascularly intact. Suspect bicep tendinitis and lateral epicondylitis. Band ineffective. Trial steroids and if pain persit, schedule PT    Relevant Medications    methylPREDNISolone 4 MG tablet therapy pack    Other Relevant Orders    Ambulatory Referral to Physical Therapy    Biceps tendinitis of left shoulder        Relevant Medications    methylPREDNISolone 4 MG tablet therapy pack    Other Relevant Orders    Ambulatory Referral to Physical Therapy              Subjective:      Patient ID: Christina Azevedo is a 46 y.o. female pain in the left elbow/upper forearm for 1 month. Came on suddenly. Tried using a compression brace but pain persisted. No injuries but does exercise regularly. Also complains of pain in upper arm below the shoulder. Hurts to lift and weakness notes. No numbness, neck pain, or swelling, or bruising reported. The following portions of the patient's history were reviewed and updated as appropriate: She  has a past medical history of Anxiety, Asthma, Colon polyp, Constipation, Eczema, Irritable bowel syndrome (IBS), Seasonal allergies, and [place] teeth extracted.   She   Patient Active Problem List    Diagnosis Date Noted   • Dermatochalasis of both upper eyelids 09/28/2023   • Perimenopause 07/21/2023   • Onychomycosis 07/21/2023   • Personal history of colonic polyps 04/28/2023   • Herpes genitalis in women 05/29/2022   • Family history of colon cancer 08/14/2020   • Constipation by delayed colonic transit 06/28/2019   • Meningioma ([address]) 10/28/2018   • Anxiety 10/09/2018   • Migraine 10/09/2018   • Depression 10/09/2018   • Allergic rhinitis due to pollen 03/28/2016   • Nephrolithiasis 05/09/2014   • Vitamin D deficiency 04/28/2014     She  has a past surgical history that includes Tonsillectomy and adenoidectomy; Colonoscopy; [place] tooth extraction; and Hysterectomy (2015). Her family history includes Colon cancer (age of onset: 50) in her father; Hypertension in her mother; No Known Problems in her daughter, maternal aunt, maternal grandfather, maternal grandmother, paternal grandfather, paternal grandmother, and sister. She  reports that she quit smoking about 30 years ago. Her smoking use included cigarettes. She started smoking about 40 years ago. She has a 2.50 pack-year smoking history. She has never used smokeless tobacco. She reports current alcohol use of about 3.0 standard drinks of alcohol per week. She reports that she does not use drugs.   Current Outpatient Medications on File Prior to Visit   Medication Sig   • ALPRAZolam (XANAX) 0.25 mg tablet Take 1 tablet (0.25 mg total) by mouth 3 (three) times a day as needed for anxiety   • b complex vitamins capsule Take 1 capsule by mouth daily   • B-D ALLERGY SYRINGE 1CC/28G 28G X 1/2" 1 ML MISC    • Cholecalciferol (VITAMIN D3) 2000 units capsule Take 2 capsules by mouth daily     • cyclobenzaprine (FLEXERIL) 10 mg tablet TAKE ONE TABLET BY MOUTH 3 TIMES A DAY AS NEEDED FOR MUSCLE SPASMS   • Dulera 100-5 MCG/ACT inhaler Inhale 2 puffs 2 (two) times a day   • Dupixent subcutaneous injection Inject 2 mL (300 mg total) under the skin every 21 days   • estradiol (Estrace) 1 mg tablet Take 1 tablet (1 mg total) by mouth daily   • fluticasone (FLONASE) 50 mcg/act nasal spray 2 sprays into each nostril daily   • gentamicin (GARAMYCIN) 0.3 % ophthalmic solution Administer 1 drop to both eyes 4 (four) times a day   • Insulin Pen Needle (B-D ULTRAFINE III SHORT PEN) 31G X 8 MM MISC Inject under the skin once a week Use as directed   • INSULIN SYRINGE 1CC/29G 29G X 1/2" 1 ML MISC    • liraglutide (Saxenda) injection increase the dose by 0.6 mg each week until the full maintenance dose of 3 mg   • Mirabegron ER 50 MG TB24 Take 1 tablet (50 mg total) by mouth in the morning   • Plecanatide 3 MG TABS Take 3 mg by mouth daily   • Progesterone 100 MG CAPS Take 100 mg by mouth at bedtime   • Prucalopride Succinate 2 MG TABS Take 2 mg by mouth daily   • Restasis 0.05 % ophthalmic emulsion    • tobramycin-dexamethasone (TOBRADEX) ophthalmic suspension    • valACYclovir (VALTREX) 500 mg tablet Take 1 tablet (500 mg total) by mouth 2 (two) times a day   • Ventolin  (90 Base) MCG/ACT inhaler    • [DISCONTINUED] citalopram (CeleXA) 40 mg tablet Take 1 tablet (40 mg total) by mouth daily     No current facility-administered medications on file prior to visit. She has No Known Allergies. .    Review of Systems      Objective:      /84 (BP Location: Left arm, Patient Position: Sitting, Cuff Size: Adult)   Pulse (!) 52   Temp 97.6 °F (36.4 °C) (Tympanic)   Resp 16   Ht 5' 5" (1.651 m)   Wt 69.2 kg (152 lb 9.6 oz)   SpO2 100%   BMI 25.39 kg/m²          Physical Exam  Vitals reviewed. Constitutional:       General: She is not in acute distress. Appearance: Normal appearance. She is not ill-appearing or toxic-appearing. HENT:      Head: Normocephalic and atraumatic. Pulmonary:      Effort: Pulmonary effort is normal.   Musculoskeletal:      Left upper arm: Swelling, tenderness and bony tenderness (along the humerus/ bicep) present. No edema or deformity. Left elbow: No swelling, deformity, effusion or lacerations. Normal range of motion. Tenderness present in lateral epicondyle. No radial head, medial epicondyle or olecranon process tenderness. Left forearm: Tenderness present. No swelling, edema, deformity or lacerations. Arms:    Skin:     Findings: No bruising or rash. Neurological:      Mental Status: She is alert.       Comments: Hand  5/5 b/l  Wrist flexion 5/5   UE B/L 5/5

## 2023-11-22 DIAGNOSIS — F32.A DEPRESSION, UNSPECIFIED DEPRESSION TYPE: ICD-10-CM

## 2023-11-22 RX ORDER — CITALOPRAM 40 MG/1
40 TABLET ORAL DAILY
Qty: 90 TABLET | Refills: 0 | Status: SHIPPED | OUTPATIENT
Start: 2023-11-22

## 2023-12-04 ENCOUNTER — APPOINTMENT (OUTPATIENT)
Dept: LAB | Facility: CLINIC | Age: 53
End: 2023-12-04
Payer: COMMERCIAL

## 2023-12-04 ENCOUNTER — EVALUATION (OUTPATIENT)
Dept: PHYSICAL THERAPY | Facility: REHABILITATION | Age: 53
End: 2023-12-04
Payer: COMMERCIAL

## 2023-12-04 DIAGNOSIS — H02.834 DERMATOCHALASIS OF BOTH UPPER EYELIDS: ICD-10-CM

## 2023-12-04 DIAGNOSIS — L20.9 ATOPIC DERMATITIS, UNSPECIFIED TYPE: ICD-10-CM

## 2023-12-04 DIAGNOSIS — M75.22 BICEPS TENDINITIS OF LEFT SHOULDER: ICD-10-CM

## 2023-12-04 DIAGNOSIS — H02.831 DERMATOCHALASIS OF BOTH UPPER EYELIDS: ICD-10-CM

## 2023-12-04 DIAGNOSIS — M77.12 EPICONDYLITIS, LATERAL, LEFT: ICD-10-CM

## 2023-12-04 LAB
ANION GAP SERPL CALCULATED.3IONS-SCNC: 7 MMOL/L
BASOPHILS # BLD AUTO: 0.06 THOUSANDS/ÂΜL (ref 0–0.1)
BASOPHILS NFR BLD AUTO: 1 % (ref 0–1)
BUN SERPL-MCNC: 17 MG/DL (ref 5–25)
CALCIUM SERPL-MCNC: 9.3 MG/DL (ref 8.4–10.2)
CHLORIDE SERPL-SCNC: 103 MMOL/L (ref 96–108)
CO2 SERPL-SCNC: 29 MMOL/L (ref 21–32)
CREAT SERPL-MCNC: 0.88 MG/DL (ref 0.6–1.3)
EOSINOPHIL # BLD AUTO: 0.15 THOUSAND/ÂΜL (ref 0–0.61)
EOSINOPHIL NFR BLD AUTO: 3 % (ref 0–6)
ERYTHROCYTE [DISTWIDTH] IN BLOOD BY AUTOMATED COUNT: 12.2 % (ref 11.6–15.1)
GFR SERPL CREATININE-BSD FRML MDRD: 74 ML/MIN/1.73SQ M
GLUCOSE P FAST SERPL-MCNC: 83 MG/DL (ref 65–99)
HCT VFR BLD AUTO: 41.6 % (ref 34.8–46.1)
HGB BLD-MCNC: 13.6 G/DL (ref 11.5–15.4)
IMM GRANULOCYTES # BLD AUTO: 0.01 THOUSAND/UL (ref 0–0.2)
IMM GRANULOCYTES NFR BLD AUTO: 0 % (ref 0–2)
LYMPHOCYTES # BLD AUTO: 1.62 THOUSANDS/ÂΜL (ref 0.6–4.47)
LYMPHOCYTES NFR BLD AUTO: 30 % (ref 14–44)
MCH RBC QN AUTO: 32.9 PG (ref 26.8–34.3)
MCHC RBC AUTO-ENTMCNC: 32.7 G/DL (ref 31.4–37.4)
MCV RBC AUTO: 101 FL (ref 82–98)
MONOCYTES # BLD AUTO: 0.5 THOUSAND/ÂΜL (ref 0.17–1.22)
MONOCYTES NFR BLD AUTO: 9 % (ref 4–12)
NEUTROPHILS # BLD AUTO: 3.16 THOUSANDS/ÂΜL (ref 1.85–7.62)
NEUTS SEG NFR BLD AUTO: 57 % (ref 43–75)
NRBC BLD AUTO-RTO: 0 /100 WBCS
PLATELET # BLD AUTO: 263 THOUSANDS/UL (ref 149–390)
PMV BLD AUTO: 10.2 FL (ref 8.9–12.7)
POTASSIUM SERPL-SCNC: 4 MMOL/L (ref 3.5–5.3)
RBC # BLD AUTO: 4.13 MILLION/UL (ref 3.81–5.12)
SODIUM SERPL-SCNC: 139 MMOL/L (ref 135–147)
WBC # BLD AUTO: 5.5 THOUSAND/UL (ref 4.31–10.16)

## 2023-12-04 PROCEDURE — 85025 COMPLETE CBC W/AUTO DIFF WBC: CPT

## 2023-12-04 PROCEDURE — 97112 NEUROMUSCULAR REEDUCATION: CPT | Performed by: PHYSICAL THERAPIST

## 2023-12-04 PROCEDURE — 36415 COLL VENOUS BLD VENIPUNCTURE: CPT

## 2023-12-04 PROCEDURE — 80048 BASIC METABOLIC PNL TOTAL CA: CPT

## 2023-12-04 PROCEDURE — 97161 PT EVAL LOW COMPLEX 20 MIN: CPT | Performed by: PHYSICAL THERAPIST

## 2023-12-04 RX ORDER — DUPILUMAB 300 MG/2ML
300 INJECTION, SOLUTION SUBCUTANEOUS
Qty: 6 ML | Refills: 0 | Status: SHIPPED | OUTPATIENT
Start: 2023-12-04 | End: 2024-12-03

## 2023-12-04 NOTE — PROGRESS NOTES
PT Evaluation     Today's date: 2023  Patient name: Edison Lacy  : 1970  MRN: [id number]  Referring provider: Anthony Dailey MD  Dx:   Encounter Diagnosis     ICD-10-CM    1. Epicondylitis, lateral, left  M77.12 Ambulatory Referral to Physical Therapy    L lateral forearm and left shoulder pain x 1 month. Suspect bicep tendinitis and lateral epicondylitis. Band ineffective. Trial steroids and if pain persit, r      2. Biceps tendinitis of left shoulder  M75.22 Ambulatory Referral to Physical Therapy          Start Time: 1400  Stop Time: 1440  Total time in clinic (min): 40 minutes    Assessment  Assessment details: Edison Lacy is a 46 y.o. female who presents to therapy for L elbow pain. Clinical signs and symptoms are consistent with lateral epicondylitis. Patient with good rehabilitative potential and would benefit from skilled PT intervention to address the indicated impairments/limitations which follow and to achieve the goals outlined. Therapist explained to pt: findings of IE, rehab diagnosis, and POC. Pt-centered goals reviewed and confirmed by pt. Instruction also given for HEP. Pt expressed verbal agreement and understanding and verified understanding via teach back method. Pt also expressed satisfaction that their current concerns were addressed at the end of the session. Impairments: abnormal or restricted ROM, activity intolerance, impaired physical strength, lacks appropriate home exercise program and pain with function    Symptom irritability: moderateBarriers to therapy: None   Understanding of Dx/Px/POC: good   Prognosis: good    Goals  Short term goals: to be met in 3-4 weeks  Patient independent with initial HEP, rationale, technique and frequency, for ROM and pain control. Patient will subjectively report a 25% reduction in soft tissue tenderness over and around the L extensor tensdon and lateral epicondyle.   Pt will achieve an improvement in FOTO score indicating an improvement in pain and function. Long term goals: to be met in 6-8 weeks  Patient will report a 75% or > reduction in subjective elbow/forearm pain complaints/symptoms on the VAS. Patient will demonstrate WNL and pain free L elbow, wrist and forearm AROM in bilateral comparison. Pt will improve FOTO score to better then expected outcome indicating an overall improvement in pain and function   Patient  will demonstrate 50-75% independence with an advanced home exercise program in order to maintain gains from physical therapy and will be able to explain rationale behind exercises and the future plan/parameters for HEP. Plan  Patient would benefit from: skilled physical therapy  Planned modality interventions: TENS, thermotherapy: hydrocollator packs, traction, ultrasound, cryotherapy and low level laser therapy  Planned therapy interventions: body mechanics training, therapeutic training, therapeutic exercise, therapeutic activities, stretching, strengthening, neuromuscular re-education, patient education, home exercise program, functional ROM exercises, flexibility, manual therapy, Barron taping, joint mobilization and balance  Frequency: 1-2x/week. Duration in weeks: 12  Treatment plan discussed with: patient      Subjective Evaluation    History of Present Illness  Mechanism of injury: Pt presents with L elbow pain. Was told by PCP she likely has lat epicondylitis and biceps tendinitis. Describes pain as piercing when she hits elbow. No n/t. This has been going on for about a month. Has tried steroid taper, rest, and trigger point band, all with no relief. Cannot recall and specific even that started this. Pain is located in the forearm wrist extensors very deep, but mostly in the elbow. No proximal pain. Pain increases with carrying items, hammer curls, bringing a cup to her mouth. Twisting and writing are ok. Driving is fine. Pain does disrupt sleep.  Does feel like the arm is weak now. Pt is L hand dominant. 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)  Hand dominance: left      Diagnostic Tests  No diagnostic tests performed  Treatments  Previous treatment: medication (trigger point band)      Objective     Palpation/Tenderness: TTP L wrist ext and L epicondyle     Active Range of Motion      Neck   AROM WNL and pain free       Upper quarter screen   Myotomes - normal BUE  Dermatomes - dull discrimination sensation: WNL BUE    Reflexes:   Biceps: 2+ B  Triceps: 2+ B  Brachioradialis: 2+ B    (+) middle finger resistance test LUE    Strength/Myotome Testing       MMT   AROM   PROM     Shoulder L R L R L R   Flexion 5        ABduction 5        External Rotation 4        Internal Rotation 5                          Elbow         Flexion 5        Extension 5        Pronation 4+        Supination 4+                 Wrist         Flexion 5        Extension 4 with pain                                 Precautions: asthma  Access Code: [email or web address]  URL: [email or web address]  Date: 2023  Prepared by: Theresa Shankar    Exercises  - Seated Eccentric Wrist Extension  - 1 x daily - 7 x weekly - 3 sets - 6-10 reps  - Standing Shoulder External Rotation with Resistance  - 1 x daily - 7 x weekly - 1 sets - 10 reps - 10 hold    FOTO   = 50/69    POC exp 24      Manuals             Laser dir contact Epicondylitis protocol - DS            STM wrist extensor nv                                      Neuro Re-Ed             Wrist ext ecc 1# 3x10            No money shoulder ER Amite 10"x10            Tband ER nv            Pro/sup ecc                                                    Ther Ex                                                                                                                     Ther Activity                                       Gait Training                                       Modalities

## 2023-12-12 NOTE — PRE-PROCEDURE INSTRUCTIONS
Pre-Surgery Instructions:   Medication Instructions    ALPRAZolam (XANAX) 0.25 mg tablet Uses PRN- OK to take day of surgery    b complex vitamins capsule Stop taking 7 days prior to surgery.    Cholecalciferol (VITAMIN D3) 2000 units capsule Stop taking 7 days prior to surgery.    citalopram (CeleXA) 40 mg tablet Take day of surgery.    cyclobenzaprine (FLEXERIL) 10 mg tablet Uses PRN- OK to take day of surgery    Dulera 100-5 MCG/ACT inhaler Take day of surgery.    Dupixent subcutaneous injection Take per normal schedule every 21 days    estradiol (Estrace) 1 mg tablet Hold day of surgery.    fluticasone (FLONASE) 50 mcg/act nasal spray Take day of surgery.    gentamicin (GARAMYCIN) 0.3 % ophthalmic solution Take day of surgery.    Lactobacillus (PROBIOTIC ACIDOPHILUS PO) Stop taking 7 days prior to surgery.    liraglutide (Saxenda) injection Hold day of surgery.    MAGNESIUM PO Stop taking 7 days prior to surgery.    Mirabegron ER 50 MG TB24 Hold day of surgery.    Progesterone 100 MG CAPS Take night before surgery    Prucalopride Succinate 2 MG TABS Hold day of surgery.    Restasis 0.05 % ophthalmic emulsion Take day of surgery.    tobramycin-dexamethasone (TOBRADEX) ophthalmic suspension Uses PRN- OK to take day of surgery    TURMERIC PO Stop taking 7 days prior to surgery.    valACYclovir (VALTREX) 500 mg tablet Take day of surgery.    Medication instructions for day surgery reviewed. Please use only a sip of water to take your instructed medications. Avoid all over the counter vitamins, supplements and NSAIDS for one week prior to surgery per anesthesia guidelines. Tylenol is ok to take as needed.     You will receive a call one business day prior to surgery with an arrival time and hospital directions. If your surgery is scheduled on a Monday, the hospital will be calling you on the Friday prior to your surgery. If you have not heard from anyone by 8pm, please call the hospital supervisor through the  hospital  at [phone]. (Dileep [phone]).    Do not eat or drink anything after midnight the night before your surgery, including candy, mints, lifesavers, or chewing gum. Do not drink alcohol 24hrs before your surgery. Try not to smoke at least 24hrs before your surgery.       Follow the pre surgery showering instructions as listed in the “My Surgical Experience Booklet” or otherwise provided by your surgeon's office. Do not use a blade to shave the surgical area 1 week before surgery. It is okay to use a clean electric clippers up to 24 hours before surgery. Do not apply any lotions, creams, including makeup, cologne, deodorant, or perfumes after showering on the day of your surgery. Do not use dry shampoo, hair spray, hair gel, or any type of hair products.     No contact lenses, eye make-up, or artificial eyelashes. Remove nail polish, including gel polish, and any artificial, gel, or acrylic nails if possible. Remove all jewelry including rings and body piercing jewelry.     Wear causal clothing that is easy to take on and off. Consider your type of surgery.    Keep any valuables, jewelry, piercings at home. Please bring any specially ordered equipment (sling, braces) if indicated.    Arrange for a responsible person to drive you to and from the hospital on the day of your surgery. Visitor Guidelines discussed.     Call the surgeon's office with any new illnesses, exposures, or additional questions prior to surgery.    Please reference your “My Surgical Experience Booklet” for additional information to prepare for your upcoming surgery.

## 2023-12-14 ENCOUNTER — TELEPHONE (OUTPATIENT)
Age: 53
End: 2023-12-14

## 2023-12-14 NOTE — TELEPHONE ENCOUNTER
Patient called to speak with Jeanne. She needs to move her pre op for tomorrow. Forwarded the call to [place]. 5

## 2023-12-15 ENCOUNTER — TELEPHONE (OUTPATIENT)
Age: 53
End: 2023-12-15

## 2023-12-15 NOTE — TELEPHONE ENCOUNTER
Patient called requesting to reaschedule her postop with Vicky on 12/29 , I offered an appointment on 1/3/24 in [place] but she said that prefers to send a email to Kirsten (surgical coordinator ), she is a provider for [facility] and she needs to be scheduled in between her patients

## 2023-12-20 DIAGNOSIS — H02.831 DERMATOCHALASIS OF BOTH UPPER EYELIDS: Primary | ICD-10-CM

## 2023-12-20 DIAGNOSIS — H02.834 DERMATOCHALASIS OF BOTH UPPER EYELIDS: Primary | ICD-10-CM

## 2023-12-20 RX ORDER — OXYCODONE HYDROCHLORIDE 5 MG/1
5 TABLET ORAL EVERY 6 HOURS PRN
Qty: 10 TABLET | Refills: 0 | Status: SHIPPED | OUTPATIENT
Start: 2023-12-20

## 2023-12-20 RX ORDER — IBUPROFEN 800 MG/1
800 TABLET ORAL EVERY 8 HOURS PRN
Qty: 15 TABLET | Refills: 0 | Status: SHIPPED | OUTPATIENT
Start: 2023-12-20

## 2023-12-20 RX ORDER — SENNOSIDES 8.6 MG
650 CAPSULE ORAL EVERY 6 HOURS
Qty: 20 TABLET | Refills: 0 | Status: SHIPPED | OUTPATIENT
Start: 2023-12-20 | End: 2023-12-25

## 2023-12-20 RX ORDER — TRAMADOL HYDROCHLORIDE 50 MG/1
50 TABLET ORAL EVERY 6 HOURS PRN
Qty: 20 TABLET | Refills: 0 | Status: SHIPPED | OUTPATIENT
Start: 2023-12-20

## 2023-12-20 RX ORDER — GABAPENTIN 300 MG/1
300 CAPSULE ORAL 3 TIMES DAILY
Qty: 15 CAPSULE | Refills: 0 | Status: SHIPPED | OUTPATIENT
Start: 2023-12-20 | End: 2023-12-25

## 2023-12-21 ENCOUNTER — ANESTHESIA (OUTPATIENT)
Dept: PERIOP | Facility: HOSPITAL | Age: 53
End: 2023-12-21
Payer: COMMERCIAL

## 2023-12-21 ENCOUNTER — HOSPITAL ENCOUNTER (OUTPATIENT)
Facility: HOSPITAL | Age: 53
Setting detail: OUTPATIENT SURGERY
Discharge: HOME | End: 2023-12-21
Attending: STUDENT IN AN ORGANIZED HEALTH CARE EDUCATION/TRAINING PROGRAM | Admitting: STUDENT IN AN ORGANIZED HEALTH CARE EDUCATION/TRAINING PROGRAM
Payer: COMMERCIAL

## 2023-12-21 ENCOUNTER — ANESTHESIA EVENT (OUTPATIENT)
Dept: PERIOP | Facility: HOSPITAL | Age: 53
End: 2023-12-21
Payer: COMMERCIAL

## 2023-12-21 VITALS
SYSTOLIC BLOOD PRESSURE: 161 MMHG | HEART RATE: 84 BPM | WEIGHT: 150 LBS | DIASTOLIC BLOOD PRESSURE: 82 MMHG | TEMPERATURE: 98 F | OXYGEN SATURATION: 96 % | RESPIRATION RATE: 16 BRPM | HEIGHT: 65 IN | BODY MASS INDEX: 24.99 KG/M2

## 2023-12-21 DIAGNOSIS — J30.1 ALLERGIC RHINITIS DUE TO POLLEN, UNSPECIFIED SEASONALITY: Primary | ICD-10-CM

## 2023-12-21 DIAGNOSIS — A60.09 HERPES GENITALIS IN WOMEN: ICD-10-CM

## 2023-12-21 PROCEDURE — 99024 POSTOP FOLLOW-UP VISIT: CPT | Performed by: STUDENT IN AN ORGANIZED HEALTH CARE EDUCATION/TRAINING PROGRAM

## 2023-12-21 PROCEDURE — 15823 BLEPHARP UPR EYELID XCSV SKN: CPT | Performed by: STUDENT IN AN ORGANIZED HEALTH CARE EDUCATION/TRAINING PROGRAM

## 2023-12-21 PROCEDURE — 15823 BLEPHARP UPR EYELID XCSV SKN: CPT | Performed by: PHYSICIAN ASSISTANT

## 2023-12-21 RX ORDER — CEFAZOLIN SODIUM 1 G/50ML
1000 SOLUTION INTRAVENOUS ONCE
Status: COMPLETED | OUTPATIENT
Start: 2023-12-21 | End: 2023-12-21

## 2023-12-21 RX ORDER — NEOSTIGMINE METHYLSULFATE 1 MG/ML
INJECTION INTRAVENOUS AS NEEDED
Status: DISCONTINUED | OUTPATIENT
Start: 2023-12-21 | End: 2023-12-21

## 2023-12-21 RX ORDER — PROPOFOL 10 MG/ML
INJECTION, EMULSION INTRAVENOUS AS NEEDED
Status: DISCONTINUED | OUTPATIENT
Start: 2023-12-21 | End: 2023-12-21

## 2023-12-21 RX ORDER — MAGNESIUM HYDROXIDE 1200 MG/15ML
LIQUID ORAL AS NEEDED
Status: DISCONTINUED | OUTPATIENT
Start: 2023-12-21 | End: 2023-12-21 | Stop reason: HOSPADM

## 2023-12-21 RX ORDER — ACETAMINOPHEN 325 MG/1
975 TABLET ORAL ONCE
Status: COMPLETED | OUTPATIENT
Start: 2023-12-21 | End: 2023-12-21

## 2023-12-21 RX ORDER — SODIUM CHLORIDE, SODIUM LACTATE, POTASSIUM CHLORIDE, CALCIUM CHLORIDE 600; 310; 30; 20 MG/100ML; MG/100ML; MG/100ML; MG/100ML
100 INJECTION, SOLUTION INTRAVENOUS CONTINUOUS
Status: DISCONTINUED | OUTPATIENT
Start: 2023-12-21 | End: 2023-12-21 | Stop reason: HOSPADM

## 2023-12-21 RX ORDER — FLUTICASONE PROPIONATE 50 MCG
2 SPRAY, SUSPENSION (ML) NASAL EVERY MORNING
Qty: 11 ML | Refills: 0 | Status: SHIPPED | OUTPATIENT
Start: 2023-12-21

## 2023-12-21 RX ORDER — ROCURONIUM BROMIDE 10 MG/ML
INJECTION, SOLUTION INTRAVENOUS AS NEEDED
Status: DISCONTINUED | OUTPATIENT
Start: 2023-12-21 | End: 2023-12-21

## 2023-12-21 RX ORDER — ONDANSETRON 2 MG/ML
INJECTION INTRAMUSCULAR; INTRAVENOUS AS NEEDED
Status: DISCONTINUED | OUTPATIENT
Start: 2023-12-21 | End: 2023-12-21

## 2023-12-21 RX ORDER — LIDOCAINE HYDROCHLORIDE AND EPINEPHRINE 10; 10 MG/ML; UG/ML
INJECTION, SOLUTION INFILTRATION; PERINEURAL AS NEEDED
Status: DISCONTINUED | OUTPATIENT
Start: 2023-12-21 | End: 2023-12-21 | Stop reason: HOSPADM

## 2023-12-21 RX ORDER — OXYCODONE HYDROCHLORIDE 5 MG/1
5 TABLET ORAL ONCE AS NEEDED
Status: DISCONTINUED | OUTPATIENT
Start: 2023-12-21 | End: 2023-12-21 | Stop reason: HOSPADM

## 2023-12-21 RX ORDER — LIDOCAINE HYDROCHLORIDE 10 MG/ML
INJECTION, SOLUTION EPIDURAL; INFILTRATION; INTRACAUDAL; PERINEURAL AS NEEDED
Status: DISCONTINUED | OUTPATIENT
Start: 2023-12-21 | End: 2023-12-21

## 2023-12-21 RX ORDER — FENTANYL CITRATE 50 UG/ML
INJECTION, SOLUTION INTRAMUSCULAR; INTRAVENOUS AS NEEDED
Status: DISCONTINUED | OUTPATIENT
Start: 2023-12-21 | End: 2023-12-21

## 2023-12-21 RX ORDER — GLYCOPYRROLATE 0.2 MG/ML
INJECTION INTRAMUSCULAR; INTRAVENOUS AS NEEDED
Status: DISCONTINUED | OUTPATIENT
Start: 2023-12-21 | End: 2023-12-21

## 2023-12-21 RX ORDER — MIDAZOLAM HYDROCHLORIDE 2 MG/2ML
INJECTION, SOLUTION INTRAMUSCULAR; INTRAVENOUS AS NEEDED
Status: DISCONTINUED | OUTPATIENT
Start: 2023-12-21 | End: 2023-12-21

## 2023-12-21 RX ORDER — EPHEDRINE SULFATE 50 MG/ML
INJECTION INTRAVENOUS AS NEEDED
Status: DISCONTINUED | OUTPATIENT
Start: 2023-12-21 | End: 2023-12-21

## 2023-12-21 RX ORDER — SUCCINYLCHOLINE/SOD CL,ISO/PF 100 MG/5ML
SYRINGE (ML) INTRAVENOUS AS NEEDED
Status: DISCONTINUED | OUTPATIENT
Start: 2023-12-21 | End: 2023-12-21

## 2023-12-21 RX ORDER — HYDROMORPHONE HCL/PF 1 MG/ML
0.4 SYRINGE (ML) INJECTION
Status: DISCONTINUED | OUTPATIENT
Start: 2023-12-21 | End: 2023-12-21 | Stop reason: HOSPADM

## 2023-12-21 RX ORDER — FENTANYL CITRATE/PF 50 MCG/ML
50 SYRINGE (ML) INJECTION
Status: DISCONTINUED | OUTPATIENT
Start: 2023-12-21 | End: 2023-12-21 | Stop reason: HOSPADM

## 2023-12-21 RX ORDER — DEXAMETHASONE SODIUM PHOSPHATE 10 MG/ML
INJECTION, SOLUTION INTRAMUSCULAR; INTRAVENOUS AS NEEDED
Status: DISCONTINUED | OUTPATIENT
Start: 2023-12-21 | End: 2023-12-21

## 2023-12-21 RX ORDER — GABAPENTIN 300 MG/1
300 CAPSULE ORAL ONCE
Status: COMPLETED | OUTPATIENT
Start: 2023-12-21 | End: 2023-12-21

## 2023-12-21 RX ORDER — MINERAL OIL AND PETROLATUM 150; 830 MG/G; MG/G
OINTMENT OPHTHALMIC AS NEEDED
Status: DISCONTINUED | OUTPATIENT
Start: 2023-12-21 | End: 2023-12-21 | Stop reason: HOSPADM

## 2023-12-21 RX ORDER — PROMETHAZINE HYDROCHLORIDE 25 MG/ML
12.5 INJECTION, SOLUTION INTRAMUSCULAR; INTRAVENOUS
Status: DISCONTINUED | OUTPATIENT
Start: 2023-12-21 | End: 2023-12-21 | Stop reason: HOSPADM

## 2023-12-21 RX ORDER — ONDANSETRON 2 MG/ML
4 INJECTION INTRAMUSCULAR; INTRAVENOUS ONCE AS NEEDED
Status: DISCONTINUED | OUTPATIENT
Start: 2023-12-21 | End: 2023-12-21 | Stop reason: HOSPADM

## 2023-12-21 RX ADMIN — ACETAMINOPHEN 975 MG: 325 TABLET ORAL at 06:11

## 2023-12-21 RX ADMIN — PROPOFOL 200 MG: 10 INJECTION, EMULSION INTRAVENOUS at 07:30

## 2023-12-21 RX ADMIN — GABAPENTIN 300 MG: 300 CAPSULE ORAL at 06:11

## 2023-12-21 RX ADMIN — NEOSTIGMINE METHYLSULFATE 3 MG: 1 INJECTION INTRAVENOUS at 08:32

## 2023-12-21 RX ADMIN — GLYCOPYRROLATE 0.4 MG: 0.4 INJECTION INTRAMUSCULAR; INTRAVENOUS at 08:32

## 2023-12-21 RX ADMIN — FENTANYL CITRATE 50 MCG: 50 INJECTION, SOLUTION INTRAMUSCULAR; INTRAVENOUS at 07:52

## 2023-12-21 RX ADMIN — EPHEDRINE SULFATE 10 MG: 50 INJECTION INTRAVENOUS at 08:00

## 2023-12-21 RX ADMIN — DEXAMETHASONE SODIUM PHOSPHATE 10 MG: 10 INJECTION, SOLUTION INTRAMUSCULAR; INTRAVENOUS at 07:41

## 2023-12-21 RX ADMIN — Medication 100 MG: at 07:31

## 2023-12-21 RX ADMIN — ROCURONIUM BROMIDE 20 MG: 10 INJECTION, SOLUTION INTRAVENOUS at 07:36

## 2023-12-21 RX ADMIN — FENTANYL CITRATE 50 MCG: 50 INJECTION, SOLUTION INTRAMUSCULAR; INTRAVENOUS at 07:30

## 2023-12-21 RX ADMIN — MIDAZOLAM 2 MG: 1 INJECTION INTRAMUSCULAR; INTRAVENOUS at 07:22

## 2023-12-21 RX ADMIN — LIDOCAINE HYDROCHLORIDE 50 MG: 10 INJECTION, SOLUTION EPIDURAL; INFILTRATION; INTRACAUDAL; PERINEURAL at 07:30

## 2023-12-21 RX ADMIN — CEFAZOLIN SODIUM 1000 MG: 1 SOLUTION INTRAVENOUS at 07:22

## 2023-12-21 RX ADMIN — SODIUM CHLORIDE, SODIUM LACTATE, POTASSIUM CHLORIDE, AND CALCIUM CHLORIDE: .6; .31; .03; .02 INJECTION, SOLUTION INTRAVENOUS at 07:09

## 2023-12-21 RX ADMIN — ONDANSETRON 4 MG: 2 INJECTION INTRAMUSCULAR; INTRAVENOUS at 07:22

## 2023-12-21 NOTE — ANESTHESIA PREPROCEDURE EVALUATION
Medical History    History Comments   Asthma Last assessed 9/25/2014   Eczema    Anxiety    Irritable bowel syndrome (IBS) Constipation   Constipation    [place] teeth extracted    Seasonal allergies    Colon polyp    Procedure:  BILATERAL BLEPHAROPLASTY UPPER (Bilateral: Eye)    Relevant Problems   ANESTHESIA (within normal limits)      CARDIO   (+) Migraine      /RENAL   (+) Nephrolithiasis      NEURO/PSYCH   (+) Anxiety   (+) Depression   (+) Migraine        Physical Exam    Airway    Mallampati score: II  TM Distance: >3 FB  Neck ROM: full     Dental       Cardiovascular  Rate: normal    Pulmonary  Pulmonary exam normal     Other Findings  Per pt denies anything remaining that is loose or removeablepost-pubertal.      Anesthesia Plan  ASA Score- 2     Anesthesia Type- general with ASA Monitors.         Additional Monitors:     Airway Plan: ETT.           Plan Factors-Exercise tolerance (METS): >4 METS.    Chart reviewed.    Patient summary reviewed.    Patient is not a current smoker.              Induction- intravenous.    Postoperative Plan- Plan for postoperative opioid use.     Informed Consent- Anesthetic plan and risks discussed with patient.  I personally reviewed this patient with the CRNA. Discussed and agreed on the Anesthesia Plan with the CRNA..

## 2023-12-21 NOTE — INTERVAL H&P NOTE
H&P reviewed. After examining the patient I find no changes in the patients condition since the H&P had been written.    Vitals:    12/21/23 0604   BP: 125/81   Pulse: 74   Resp: 16   Temp: (!) 97.2 °F (36.2 °C)   SpO2: 99%

## 2023-12-21 NOTE — H&P
Assessment and Plan:  Ms. Sharma is a 52 y.o. female presenting with b/l dermatochalasis of upper eyelids     OR for upper blepharoplasty     History of Present Illness:   Ms. Sharma is a 52 y.o. female presenting with b/l upper lid dermatochalasis.  Has not yet had visual fields performed.  Does report lateral skin has been worsening over the past few months to years and she experiences significant afternoon and night heaviness.  She does have eczema and is on therapy for it (dupixent).  She does report some dry eye symptoms.  Denies nicotine use        Review of Systems:  A 12 point ROS was performed and negative except per HPI.     Past Medical History:  Medical History        Past Medical History:   Diagnosis Date    Anxiety      Asthma       Last assessed 2014    Colon polyp      Constipation      Eczema      Irritable bowel syndrome (IBS)      Seasonal allergies      [place] teeth extracted              Past Surgical History:  Surgical History         Past Surgical History:   Procedure Laterality Date    COLONOSCOPY        HYSTERECTOMY   2015    TONSILLECTOMY AND ADENOIDECTOMY        WISDOM TOOTH EXTRACTION                Social History:  Social History            Tobacco Use    Smoking status: Former       Packs/day: 0.25       Years: 10.00       Total pack years: 2.50       Types: Cigarettes       Start date:        Quit date: 1993       Years since quittin.1    Smokeless tobacco: Never   Vaping Use    Vaping Use: Never used   Substance Use Topics    Alcohol use: Yes       Alcohol/week: 3.0 standard drinks of alcohol       Types: 3 Glasses of wine per week       Comment: Social    Drug use: Never         Family History:  Family History         Family History   Problem Relation Age of Onset    Hypertension Mother      Colon cancer Father 48    No Known Problems Sister      No Known Problems Daughter      No Known Problems Maternal Grandmother      No Known Problems Maternal Grandfather       "No Known Problems Paternal Grandmother      No Known Problems Paternal Grandfather      No Known Problems Maternal Aunt              Allergies:  No Known Allergies     Medications:         Current Outpatient Medications on File Prior to Visit   Medication Sig Dispense Refill    ALPRAZolam (XANAX) 0.25 mg tablet Take 1 tablet (0.25 mg total) by mouth 3 (three) times a day as needed for anxiety 30 tablet 0    b complex vitamins capsule Take 1 capsule by mouth daily        B-D ALLERGY SYRINGE 1CC/28G 28G X 1/2\" 1 ML MISC     2    Cholecalciferol (VITAMIN D3) 2000 units capsule Take 2 capsules by mouth daily          citalopram (CeleXA) 40 mg tablet Take 1 tablet (40 mg total) by mouth daily 90 tablet 0    cyclobenzaprine (FLEXERIL) 10 mg tablet TAKE ONE TABLET BY MOUTH 3 TIMES A DAY AS NEEDED FOR MUSCLE SPASMS 45 tablet 0    Dulera 100-5 MCG/ACT inhaler          Dupixent subcutaneous injection Inject 2 mL (300 mg total) under the skin every 21 days 6 mL 5    fluticasone (FLONASE) 50 mcg/act nasal spray 2 sprays into each nostril daily        gentamicin (GARAMYCIN) 0.3 % ophthalmic solution Administer 1 drop to both eyes 4 (four) times a day 5 mL 0    Insulin Pen Needle (B-D ULTRAFINE III SHORT PEN) 31G X 8 MM MISC Inject under the skin once a week Use as directed 100 each 0    INSULIN SYRINGE 1CC/29G 29G X 1/2\" 1 ML MISC          liraglutide (Saxenda) injection increase the dose by 0.6 mg each week until the full maintenance dose of 3 mg 15 mL 0    Mirabegron ER 50 MG TB24 Take 1 tablet (50 mg total) by mouth in the morning 30 tablet 11    Plecanatide 3 MG TABS Take 3 mg by mouth daily 90 tablet 3    Prucalopride Succinate 2 MG TABS Take 2 mg by mouth daily 90 tablet 3    Restasis 0.05 % ophthalmic emulsion          terbinafine (LamISIL) 250 mg tablet Take 1 tablet (250 mg total) by mouth daily 30 tablet 2    terbinafine (LamISIL) 250 mg tablet Take 1 tablet (250 mg total) by mouth daily 90 tablet 0    " "tobramycin-dexamethasone (TOBRADEX) ophthalmic suspension          valACYclovir (VALTREX) 500 mg tablet Take 1 tablet (500 mg total) by mouth 2 (two) times a day 180 tablet 0    Ventolin  (90 Base) MCG/ACT inhaler            No current facility-administered medications on file prior to visit.            Physical Examination:  /72   Ht 5' 5\" (1.651 m)   Wt 67.1 kg (148 lb)   BMI 24.63 kg/m²   Estimated body mass index is 24.63 kg/m² as calculated from the following:    Height as of this encounter: 5' 5\" (1.651 m).    Weight as of this encounter: 67.1 kg (148 lb).  General: NAD, well appearing, AAOx3  HEENT: NCAT, EOMI, MMM, supple  Resp: Nonlabored  Heart: RRR  Abdomen: Soft, ND, NT  Extremities/MSK: no LE edema, no obvious deficits in ROM  Neuro: grossly intact with no obvious deficits  Skin: no obvious lesions or rashes     Eyes: brow position at level/just above SOR, b/l dermatochalsis - worse laterally, b/l lower lid dermatochalasis  "

## 2023-12-21 NOTE — ANESTHESIA POSTPROCEDURE EVALUATION
Post-Op Assessment Note    CV Status:  Stable  Pain Score: 0    Pain management: adequate       Mental Status:  Alert and awake   Hydration Status:  Euvolemic   PONV Controlled:  Controlled   Airway Patency:  Patent     Post Op Vitals Reviewed: Yes      Staff: CRNA           /75 (12/21/23 0839)    Temp 97.5 °F (36.4 °C) (12/21/23 0839)    Pulse  66   Resp 16 (12/21/23 0839)    SpO2 96 % (12/21/23 0839)

## 2023-12-21 NOTE — OP NOTE
OPERATIVE REPORT  PATIENT NAME: Alina Sharma    :  1970  MRN: [id number]  Pt Location:  OR ROOM 08    SURGERY DATE: 2023    Surgeons and Role:     * Soraya German, DO - Primary     * Vicky Tavera PA-C - Assisting    Preop Diagnosis:  Dermatochalasis of both upper eyelids [H02.831, H02.834]    Post-Op Diagnosis Codes:     * Dermatochalasis of both upper eyelids [H02.831, H02.834]    Procedure(s):  Bilateral - BILATERAL BLEPHAROPLASTY UPPER    Specimen(s):  * No specimens in log *    Estimated Blood Loss:   2 mL    Drains:  * No LDAs found *    Anesthesia Type:   General - TIVA    Operative Indications:  Dermatochalasis of both upper eyelids [H02.831, H02.834]    Operative Findings:  Upper blepharoplasty with appropriate gentle up turn of lashes upon completion of the case    Complications:   None    Procedure and Technique:  The patient was seen preoperatively.  The procedure, risks, benefits and alternatives were discussed.  Informed consent was obtained.  The patient was site marked preoperatively.  The patient was brought to the operating room where she was positioned supine with all of her pressure points appropriately padded.  Anesthesia commenced.  A timeout was performed and verified.     The patient was prepped and draped in usual sterile fashion.  Corneal shields were placed.  A caliper was used to confirm measurements and a pinch test was used to see a gentle up turn of the upper lash line. The upper eyelids were infiltrated with local anesthesia with care not to disrupt the markings. The right sided markings were incised and the excess skin was excised.  Hemostasis was obtained.  Hemostasis was ensured.  The orbicularis was then closed using 5-0 monocryl.  Next the skin was closed using a combination of interrupted and running 6-0 prolene.  The same procedure was performed on the left side.      The corneal shields were removed and the eyes irrigated with BSS  solution.  The area was cleansed and ointment was applied liberally.     The instrument, sponge and needle count was correct an verified prior to completion of the case.     The patient emerged from anesthesia and was transferred to the recovery room in stable condition.      Patient Disposition:  PACU         SIGNATURE: Sorayadivya Reyezpatience German DO  DATE: December 21, 2023  TIME: 8:22 AM    No qualified plastic surgery resident was available for the case.  The PA-C provided assistance with retraction and suturing.

## 2023-12-21 NOTE — DISCHARGE INSTR - AVS FIRST PAGE
Surgery Date: 12/21/2023                Patient: Alina Sharma  Surgeon: Dr. German     Postoperative Instructions   Blepharoplasty    Dressings:  [x] Non-absorbable sutures were used to close your incision.  [x] Dab a small amount of the prescribed ointment along your incisions after bathing.  [x] No dressings are required postoperatively.  [] Other instructions:     Bathing:  [x] Shower 48 hours after surgery.  Allow soap and water to gently run over the incision.  No scrubbing.  Gently pat dry and apply dressing as needed/instructed above.  [x] No submerging incision in bathtub, pool, hot tub and/or lake.    Activity:  [x] No heavy lifting (> 10lbs).  [x] No strenuous exercise.  [x] Walking is permitted and encouraged.  [x] No driving until off pain medications.  [x] Recommend sleeping with head of bed elevated.  [x] No contact lenses or makeup surrounding the eyes until cleared at postoperative office appointment.    Diet and Medication:  [x] Resume diet as tolerated.  High protein diet is important for healing.  Remain well hydrated and minimize sodium intake.  [x] Resume preoperative medications.  [x] Pain medications as prescribed.  You may also begin to use ibuprofen 48 hours after surgery.  [x] Apply ice to area as needed.  Do not place ice directly on skin.  Do not use heat.  [] Other instructions:     It is expected to have some bruising, swelling and mild oozing at the incision site and of the surrounding area.  If there is more than you expect, an enlarging area or you suspect an infection, please call the office.    Some patients may experience a low-grade fever after surgery.  If it is above 100.4, please call the office.    If you do not have a postoperative office appointment scheduled, please call the office today and let the staff know you are to be seen in 7 days.     Please call [phone] with any questions, concerns or changes.

## 2023-12-22 NOTE — ASSESSMENT & PLAN NOTE
I did review the MRI in detail I have explained to the patient is a small meningioma likely asymptomatic although patient does have mild hyper reflexia I would like to get the opinion of neurosurgery
Yes

## 2023-12-26 DIAGNOSIS — E66.09 CLASS 1 OBESITY DUE TO EXCESS CALORIES WITHOUT SERIOUS COMORBIDITY WITH BODY MASS INDEX (BMI) OF 30.0 TO 30.9 IN ADULT: ICD-10-CM

## 2023-12-26 RX ORDER — PEN NEEDLE, DIABETIC 31 GX5/16"
NEEDLE, DISPOSABLE MISCELLANEOUS WEEKLY
Qty: 100 EACH | Refills: 1 | Status: SHIPPED | OUTPATIENT
Start: 2023-12-26

## 2023-12-28 ENCOUNTER — OFFICE VISIT (OUTPATIENT)
Dept: PLASTIC SURGERY | Facility: CLINIC | Age: 53
End: 2023-12-28

## 2023-12-28 ENCOUNTER — TELEPHONE (OUTPATIENT)
Age: 53
End: 2023-12-28

## 2023-12-28 DIAGNOSIS — N95.1 MENOPAUSAL SYMPTOMS: ICD-10-CM

## 2023-12-28 DIAGNOSIS — H02.831 DERMATOCHALASIS OF BOTH UPPER EYELIDS: Primary | ICD-10-CM

## 2023-12-28 DIAGNOSIS — H02.834 DERMATOCHALASIS OF BOTH UPPER EYELIDS: Primary | ICD-10-CM

## 2023-12-28 PROCEDURE — 99024 POSTOP FOLLOW-UP VISIT: CPT | Performed by: PHYSICIAN ASSISTANT

## 2023-12-28 NOTE — LETTER
December 28, 2023     Patient: Alina Sharma   YOB: 1970   Date of Visit: 12/28/2023       To Whom it May Concern:    Alina Sharma was seen in my clinic on 12/28/2023. She is to be our of work from 12/21/2023- 12/29/2023. She is able to return on 01/02/2024 with no restrictions.     If you have any questions or concerns, please don't hesitate to call.         Sincerely,                Vicky Tavera PA-C        CC: No Recipients

## 2023-12-28 NOTE — TELEPHONE ENCOUNTER
Pt calling in to see how long it would be until she can go into a hot tub. RN recommended to wait 2 weeks before going into hot tub.

## 2023-12-28 NOTE — PROGRESS NOTES
Assessment and Plan:  Alina Sharma 53 y.o. female s/p bilateral upper blepharoplasty 12/21/23, presenting for first post op and suture removal    - sutures removed  - instructed patient to massage incisions daily, especially while in the shower  - aquaphor to incisions daily  - ice as needed  - return in 1 week for incision check    Subjective:  Doing well.  Denies issues.    Objective:  NAD, AAOx3  Bilateral eye incisions C/D/I, sutures removed, moderate swelling and bruising      Vicky Tavera PA-C  Plastic and Reconstructive Surgery

## 2024-01-02 DIAGNOSIS — N95.1 MENOPAUSAL SYMPTOMS: ICD-10-CM

## 2024-01-02 RX ORDER — ESTRADIOL 1 MG/1
1 TABLET ORAL DAILY
Qty: 90 TABLET | Refills: 0 | Status: SHIPPED | OUTPATIENT
Start: 2024-01-02 | End: 2024-01-02 | Stop reason: SDUPTHER

## 2024-01-02 RX ORDER — ESTRADIOL 1 MG/1
TABLET ORAL
Qty: 90 TABLET | Refills: 4 | Status: SHIPPED | OUTPATIENT
Start: 2024-01-02

## 2024-01-03 ENCOUNTER — TELEPHONE (OUTPATIENT)
Dept: DERMATOLOGY | Facility: CLINIC | Age: 54
End: 2024-01-03

## 2024-01-03 ENCOUNTER — OFFICE VISIT (OUTPATIENT)
Dept: PLASTIC SURGERY | Facility: CLINIC | Age: 54
End: 2024-01-03

## 2024-01-03 DIAGNOSIS — H02.831 DERMATOCHALASIS OF BOTH UPPER EYELIDS: Primary | ICD-10-CM

## 2024-01-03 DIAGNOSIS — H02.834 DERMATOCHALASIS OF BOTH UPPER EYELIDS: Primary | ICD-10-CM

## 2024-01-03 PROCEDURE — RECHECK: Performed by: PHYSICIAN ASSISTANT

## 2024-01-03 NOTE — TELEPHONE ENCOUNTER
refill auth request form has been scanned in chart, I did notice that this pt has been given a refill already in the beginning of December. Form came through [employer], thank you!

## 2024-01-03 NOTE — PROGRESS NOTES
Assessment/Plan:     Pt is a 53 YOF who is s/p bilateral upper blepharoplasty on 12/21/23 by Dr. German. Please see HPI.     Patient returns to the office today for a incision check.  All incisions are healing appropriately with minimal scarring.  Please see photos in media.  Patient will return to the office in approximately 4 to 6 weeks for scar check or sooner with any questions or concerns.     There are no diagnoses linked to this encounter.      Subjective:     Patient ID: Alina Sharma is a 53 y.o. female.    HPI    Patient is concerned about swelling around her incision sites.  I did reassure the patient that the amount of post operative swelling noted was normal limits and expected.  Patient voiced understanding.    Review of Systems    See HPI     Objective:     Physical Exam      All incisions are completely healed, clean and dry minimal scarring.  Please see photos in media.

## 2024-01-06 DIAGNOSIS — E66.09 CLASS 1 OBESITY DUE TO EXCESS CALORIES WITHOUT SERIOUS COMORBIDITY WITH BODY MASS INDEX (BMI) OF 30.0 TO 30.9 IN ADULT: ICD-10-CM

## 2024-01-10 RX ORDER — LIRAGLUTIDE 6 MG/ML
INJECTION, SOLUTION SUBCUTANEOUS
Qty: 15 ML | Refills: 5 | Status: SHIPPED | OUTPATIENT
Start: 2024-01-10

## 2024-01-12 ENCOUNTER — HOSPITAL ENCOUNTER (OUTPATIENT)
Dept: MAMMOGRAPHY | Facility: CLINIC | Age: 54
Discharge: HOME | End: 2024-01-12
Payer: COMMERCIAL

## 2024-01-12 VITALS — WEIGHT: 150 LBS | BODY MASS INDEX: 24.99 KG/M2 | HEIGHT: 65 IN

## 2024-01-12 DIAGNOSIS — R92.8 ABNORMAL MAMMOGRAM: ICD-10-CM

## 2024-01-12 PROCEDURE — G0279 TOMOSYNTHESIS, MAMMO: HCPCS

## 2024-01-12 PROCEDURE — 77066 DX MAMMO INCL CAD BI: CPT

## 2024-02-01 DIAGNOSIS — L20.9 ATOPIC DERMATITIS, UNSPECIFIED TYPE: ICD-10-CM

## 2024-02-02 RX ORDER — DUPILUMAB 300 MG/2ML
300 INJECTION, SOLUTION SUBCUTANEOUS
Qty: 2 ML | Refills: 3 | Status: SHIPPED | OUTPATIENT
Start: 2024-02-02 | End: 2025-02-01

## 2024-02-02 NOTE — TELEPHONE ENCOUNTER
Huber Cuenca,     Refill good through May. She will need another prior auth and visit to submit note to insurance company. Please call and let her know. Thanks!    -Edmund

## 2024-02-14 DIAGNOSIS — E66.09 CLASS 1 OBESITY DUE TO EXCESS CALORIES WITHOUT SERIOUS COMORBIDITY WITH BODY MASS INDEX (BMI) OF 30.0 TO 30.9 IN ADULT: ICD-10-CM

## 2024-02-14 RX ORDER — LIRAGLUTIDE 6 MG/ML
INJECTION, SOLUTION SUBCUTANEOUS
Qty: 15 ML | Refills: 1 | Status: SHIPPED | OUTPATIENT
Start: 2024-02-14

## 2024-02-24 DIAGNOSIS — A60.09 HERPES GENITALIS IN WOMEN: ICD-10-CM

## 2024-02-24 DIAGNOSIS — F32.A DEPRESSION, UNSPECIFIED DEPRESSION TYPE: ICD-10-CM

## 2024-02-26 RX ORDER — CITALOPRAM 40 MG/1
40 TABLET ORAL DAILY
Qty: 90 TABLET | Refills: 0 | Status: SHIPPED | OUTPATIENT
Start: 2024-02-26

## 2024-02-26 NOTE — TELEPHONE ENCOUNTER
Requested medication(s) are due for refill today: Yes  Patient has already received a courtesy refill: No  Other reason request has been forwarded to provider: per protocol

## 2024-02-27 RX ORDER — VALACYCLOVIR HYDROCHLORIDE 500 MG/1
500 TABLET, FILM COATED ORAL EVERY MORNING
Qty: 90 TABLET | Refills: 3 | Status: SHIPPED | OUTPATIENT
Start: 2024-02-27 | End: 2025-02-21

## 2024-02-29 DIAGNOSIS — E66.09 CLASS 1 OBESITY DUE TO EXCESS CALORIES WITHOUT SERIOUS COMORBIDITY WITH BODY MASS INDEX (BMI) OF 30.0 TO 30.9 IN ADULT: ICD-10-CM

## 2024-03-01 RX ORDER — LIRAGLUTIDE 6 MG/ML
INJECTION, SOLUTION SUBCUTANEOUS
Qty: 15 ML | Refills: 3 | Status: SHIPPED | OUTPATIENT
Start: 2024-03-01 | End: 2024-03-03 | Stop reason: SDUPTHER

## 2024-03-03 DIAGNOSIS — E66.09 CLASS 1 OBESITY DUE TO EXCESS CALORIES WITHOUT SERIOUS COMORBIDITY WITH BODY MASS INDEX (BMI) OF 30.0 TO 30.9 IN ADULT: ICD-10-CM

## 2024-03-03 RX ORDER — LIRAGLUTIDE 6 MG/ML
INJECTION, SOLUTION SUBCUTANEOUS
Qty: 15 ML | Refills: 3 | Status: SHIPPED | OUTPATIENT
Start: 2024-03-03

## 2024-03-05 NOTE — PROGRESS NOTES
Assessment    1  Nephrolithiasis (592 0) (N20 0)    Plan  Nephrolithiasis    · (1) BASIC METABOLIC PROFILE; Status:Active; Requested for:30Nov2017;    Perform:[facility]; Due:30Nov2018; Ordered;  For:Nephrolithiasis; Ordered By:Suly Fisher;   · * XR ABDOMEN 1 VIEW KUB; Status:Active - Retrospective Authorization; Requested  for:30May2018; Perform:[facility]; QIA:[id number]; Last Updated Ayanadian Fady; 11/30/2017 9:40:08 AM;Ordered;  For:Nephrolithiasis; Ordered By:Suly Fisher;   · US KIDNEY AND BLADDER WITH PVR; Status:Active; Requested for:Approx [id number]; Perform:[facility]; Order Comments:WITH PVR; [facility]:[id number];ENDMXQM;  For:Nephrolithiasis; Ordered By:Suly Fisher;   · US KIDNEY AND BLADDER; Status:Hold For - Scheduling,Retrospective Authorization; Requested for:30May2018; Perform:[facility]; QWK:[id number]; Last Updated Joe Shrestha; 11/30/2017 9:40:08 AM;Ordered;  For:Nephrolithiasis; Ordered By:Suly Fisher;    Discussion/Summary  Discussion Summary:   41-year-old female Managed by Dr Lomas Presumdian    1  Nephrolithiasis    Discussed the patient's KUB and ultrasound with her  Discussed that her stone burden is not large enough to require any surgical intervention at this time  Would like to obtain a BMP to check her creatinine due to her reports of bilateral flank pain upon waking up in the morning and being relieved by uriantion  If within normal limits will follow up in 6 months with a KUB and ultrasound with PVR obtained prior  Discussed diet modification to help prevent the patient's stone burden from increasing in size  She understands and agrees this treatment plan  All questions and concerns have been addressed and answered  Goals and Barriers: The patient has the current Goals: Increase fluid intake to 60 oz a day  The patent has the current Barriers: None  Patient's Capacity to Self-Care: Patient is able to Self-Care  Noted sent, thx nursing   Chief Complaint  Chief Complaint Free Text Note Form: nephrolithiasis      History of Present Illness  HPI: Zachery ewing is a 58-year-old female here for follow-up evaluation of her nephrolithiasis  She had a KUB and ultrasound obtained prior to her visit revealing bilateral stone burden  Ultrasound reveals 2 stones in the right kidney 2 mm and 5 mm and the 7 mm left stone  KUB reveals bilateral calculi as well a 2 mm in the right and 4 mm in the left  Lower urinary tract wise she admits to some incontinence and hesitancy as well as a fair stream  She states that when she wakes up in the morning she has bilateral flank pain and after voiding this pain subsides  Denies any vesicoureteral reflux but did have recurrent UTIs as a child  Review of Systems  Complete-Female Urology:   Constitutional: No fever, no chills, feels well, no tiredness, no recent weight gain or weight loss  Respiratory: No complaints of shortness of breath, no wheezing, no cough, no SOB on exertion, no orthopnea, no PND  Cardiovascular: No complaints of slow heart rate, no fast heart rate, no chest pain, no palpitations, no leg claudication, no lower extremity edema  Gastrointestinal: No complaints of abdominal pain, no constipation, no nausea or vomiting, no diarrhea, no bloody stools  Genitourinary: urinary hesitancy, Empty sensation, incontinence and stream quality fair, but no dysuria, no feelings of urinary urgency, no hematuria and no nocturia  Musculoskeletal: No complaints of arthralgias, no myalgias, no joint swelling or stiffness, no limb pain or swelling  Integumentary: No complaints of skin rash or lesions, no itching, no skin wounds, no breast pain or lump  Hematologic/Lymphatic: No complaints of swollen glands, no swollen glands in the neck, does not bleed easily, does not bruise easily     Neurological: No complaints of headache, no confusion, no convulsions, no numbness, no dizziness or fainting, no tingling, no limb weakness, no difficulty walking  ROS Reviewed:   ROS reviewed  Active Problems    1  Acute pyelonephritis (590 10) (N10)   2  Allergic rhinitis due to pollen (477 0) (J30 1)   3  Anxiety (300 00) (F41 9)   4  Asthma exacerbation (493 92) (J45 901)   5  Depression (311) (F32 9)   6  Encounter for routine pelvic examination (V72 31) (Z01 419)   7  Encounter for screening for malignant neoplasm of cervix (V76 2) (Z12 4)   8  Encounter for screening mammogram for malignant neoplasm of breast (V76 12)   (Z12 31)   9  Flank pain (789 09) (R10 9)   10  Herpes simplex infection (054 9) (B00 9)   11  History of allergy (V15 09) (Z88 9)   12  Migraine headache (346 90) (G43 909)   13  Nephrolithiasis (592 0) (N20 0)   14  Staph skin infection (686 9,041 10) (L08 9,B95 8)   15  Urinary tract infection, site unspecified (599 0) (N39 0)   16  Vitamin D deficiency (268 9) (E55 9)    Past Medical History    1  History of Asthma (493 90) (J45 909)   2  History of Conjunctival laceration (871 4) (S05 30XA)   3  History of eczema (V13 3) (Z87 2)   4  History of low back pain (V13 59) (Z87 39)   5  History of urinary frequency (V13 09) (Z87 898)   6  History of visual disturbance (V12 49) (Z86 69)   7  History of Urgency of urination (788 63) (R39 15)  Active Problems And Past Medical History Reviewed: The active problems and past medical history were reviewed and updated today  Surgical History    1  History of Tonsillectomy With Adenoidectomy  Surgical History Reviewed: The surgical history was reviewed and updated today  Family History  Mother    1  Family history of Hypertension (V17 49)  Father    2  Family history of Colon Cancer (V16 0)  Family History Reviewed: The family history was reviewed and updated today  Social History    · Being A Social Drinker   · Former smoker (I63 11) (A61 451)  Social History Reviewed: The social history was reviewed and updated today   The social history was reviewed and is unchanged  Current Meds   1  Acyclovir 400 MG Oral Tablet; TAKE ONE TABLET BY MOUTH 3 TIMES A DAY AS NEEDED; Therapy: 45FDS9873 to (Evaluate:19Jun2016)  Requested for: 21Mar2016; Last   Rx:21Mar2016 Ordered   2  ALPRAZolam 0 25 MG Oral Tablet; Take 1 tablet 3 times daily as needed; Therapy: 18Kwv3641 to (Evaluate:83Ctr9788); Last Rx:01Sep2017 Ordered   3  Citalopram Hydrobromide 40 MG Oral Tablet; take one tablet by mouth every day; Therapy: 00KNX2542 to (Last Rx:17Rbi8453)  Requested for: 63Sjb1509 Ordered   4  Citalopram Hydrobromide 40 MG Oral Tablet; take one tablet by mouth every day; Therapy: 88XMZ5953 to (Last BA:37XZU6516)  Requested for: 68NRF9532 Ordered   5  Claritin 10 MG Oral Tablet; TAKE 1 TABLET DAILY AS NEEDED; Therapy: 13VGA5966 to (Evaluate:07Jan2016); Last Rx:12Jan2015 Ordered   6  Flonase 50 MCG/ACT SUSP; USE 2 SPRAYS IN EACH NOSTRIL ONCE DAILY; Therapy: 46Ihe8476 to (Evaluate:64Uln7300) Recorded   7  Topiramate 100 MG Oral Tablet; TAKE 1 TABLET AT BEDTIME; Therapy: 13YQA8794 to (Last Rx:30Jun2017)  Requested for: 30Jun2017 Ordered   8  Vitamin D3 2000 UNIT Oral Capsule; take 1 capsule daily; Therapy: (Recorded:28Apr2014) to Recorded  Medication List Reviewed: The medication list was reviewed and updated today  Allergies    1  No Known Drug Allergies    2  Seasonal    Vitals  Vital Signs    Recorded: 18DJO8600 63:17OE   Systolic 055   Diastolic 80   Height 5 ft 6 in   Weight 156 lb 4 oz   BMI Calculated 25 22   BSA Calculated 1 8     Physical Exam    Constitutional   General appearance: No acute distress, well appearing and well nourished  Eyes   Conjunctiva and lids: No swelling, erythema or discharge  Ears, Nose, Mouth, and Throat   Hearing: Normal     Pulmonary   Respiratory effort: No increased work of breathing or signs of respiratory distress  Abdomen   Abdomen: Non-tender, no masses      Musculoskeletal   Gait and station: Normal  Psychiatric   Mood and affect: Normal        Results/Data  * XR ABDOMEN 1 VIEW KUB 86OHW8079 08:22AM Simba Raad Order Number: [id number]     Test Name Result Flag Reference   XR ABDOMEN 1 VIEW KUB (Report)     ABDOMEN     INDICATION: Abdominal pain  COMPARISON: None  VIEWS: AP supine     IMAGES: 2     FINDINGS:     No dilated loops of small bowel  Moderate excess stool burden  Gas and stool extends to the rectum  No discernible free air on this supine study  Upright or left lateral decubitus imaging is more sensitive to detect subtle free air in the appropriate setting  Bilateral upper pole nephroliths demonstrated  Single stone on the right measures 2 mm  Largest on the left measures 4 mm  No stones along the expected courses of the ureters or over the bladder  Lung bases not visualized  Visualized osseous structures are unremarkable for the patient's age  IMPRESSION:     Nephrolithiasis without evidence for ureteral stone  Workstation performed: [id number]     Signed by:   Milka Hartmann MD   11/15/17     US KIDNEY AND BLADDER 05ATQ8600 08:21AM Simba Raad Order Number: [id number]    - Patient Instructions: To schedule this appointment, please contact Central Scheduling at [phone]  Test Name Result Flag Reference   US KIDNEY AND BLADDER (Report)     RENAL ULTRASOUND     INDICATION: Bilateral flank pain  History of kidney stones  COMPARISON: 5/1/2014     TECHNIQUE:  Ultrasound of the retroperitoneum was performed with a curvilinear transducer utilizing volumetric sweeps and still imaging techniques  FINDINGS: Evaluation of the left kidney is limited by overlying bowel gas and rib shadows  KIDNEYS:   Symmetric and normal size  Right kidney: 11 3 cm  Normal echogenicity and contour  No suspicious masses detected  No hydronephrosis  2 mm calculus in the midpole, and 5 Rodríguez calculus in the lower pole     No perinephric fluid collections  Left kidney: 10 3 cm  Normal echogenicity and contour  No suspicious masses detected  No hydronephrosis  There is a 7 mm calculus in the midpole  No perinephric fluid collections  URETERS:   Nonvisualized  BLADDER:    Normally distended  No focal thickening or mass lesions  Bilateral ureteral jets detected  IMPRESSION:     Limited visualization of the left kidney  No hydronephrosis  Nonobstructing bilateral renal calculi          Workstation performed: [id number]     Signed by:   Tyrone Sprague MD   11/15/17     Future Appointments    Date/Time Provider Specialty Site   06/06/2018 08:15 AM Iglesia Zelaya MD Urology  [id number]     Signatures   Electronically signed by : Triston Griffith, ; Nov 30 2017  9:47AM EST                       (Author)

## 2024-03-13 DIAGNOSIS — E66.3 OVERWEIGHT (BMI 25.0-29.9): Primary | ICD-10-CM

## 2024-03-13 DIAGNOSIS — E66.3 OVERWEIGHT (BMI 25.0-29.9): ICD-10-CM

## 2024-03-13 RX ORDER — SEMAGLUTIDE 1 MG/.5ML
INJECTION, SOLUTION SUBCUTANEOUS
Qty: 2 ML | Refills: 0 | Status: SHIPPED | OUTPATIENT
Start: 2024-03-13

## 2024-03-18 ENCOUNTER — TELEPHONE (OUTPATIENT)
Dept: FAMILY MEDICINE CLINIC | Facility: CLINIC | Age: 54
End: 2024-03-18

## 2024-03-18 NOTE — TELEPHONE ENCOUNTER
Fax received for prior auth request to Wegovy 1MG/0.5ml auto-injectors  Key: [id number]  Please initiate prior auth. Thank you.

## 2024-03-18 NOTE — TELEPHONE ENCOUNTER
Spoke with patient, patient had stated Saxenda is no longer available. Patient stated her provider is switching her to Wegovy. Please advise.

## 2024-03-20 NOTE — TELEPHONE ENCOUNTER
PA for Wegovy     Submitted via    []CMM-KEY   [x]Sushil-Case ID # [id number]   []Faxed to plan   []Other website   []Phone call Case ID #     Office notes sent, clinical questions answered. Awaiting determination    Turnaround time for your insurance to make a decision on your Prior Authorization can take 7-21 business days.

## 2024-03-21 ENCOUNTER — TELEPHONE (OUTPATIENT)
Dept: FAMILY MEDICINE CLINIC | Facility: CLINIC | Age: 54
End: 2024-03-21

## 2024-03-22 ENCOUNTER — OFFICE VISIT (OUTPATIENT)
Dept: FAMILY MEDICINE CLINIC | Facility: CLINIC | Age: 54
End: 2024-03-22
Payer: COMMERCIAL

## 2024-03-22 ENCOUNTER — TELEPHONE (OUTPATIENT)
Age: 54
End: 2024-03-22

## 2024-03-22 VITALS
RESPIRATION RATE: 16 BRPM | BODY MASS INDEX: 26.46 KG/M2 | TEMPERATURE: 97.2 F | DIASTOLIC BLOOD PRESSURE: 84 MMHG | WEIGHT: 158.8 LBS | SYSTOLIC BLOOD PRESSURE: 135 MMHG | HEIGHT: 65 IN | HEART RATE: 79 BPM | OXYGEN SATURATION: 98 %

## 2024-03-22 DIAGNOSIS — E66.3 OVERWEIGHT (BMI 25.0-29.9): ICD-10-CM

## 2024-03-22 DIAGNOSIS — B35.1 ONYCHOMYCOSIS OF GREAT TOE: Primary | ICD-10-CM

## 2024-03-22 DIAGNOSIS — Z12.31 ENCOUNTER FOR SCREENING MAMMOGRAM FOR MALIGNANT NEOPLASM OF BREAST: ICD-10-CM

## 2024-03-22 PROCEDURE — 99213 OFFICE O/P EST LOW 20 MIN: CPT | Performed by: NURSE PRACTITIONER

## 2024-03-22 NOTE — TELEPHONE ENCOUNTER
Caller: Alina Sharma    Doctor: Dr. Coronado    Reason for call: appt/checked chart/NP as not seen in over 5 years/scheduled    Call back#: NA

## 2024-03-22 NOTE — PROGRESS NOTES
FAMILY PRACTICE OFFICE VISIT       NAME: Alina Sharma  AGE: 53 y.o. SEX: female       : 1970        MRN: [id number]    DATE: 3/27/2024  TIME: 12:17 PM    Assessment and Plan   1. Onychomycosis of great toe  -     Ambulatory Referral to Podiatry; Future    2. Overweight (BMI 25.0-29.9)  Assessment & Plan:  Wegovy  Cont healthy eating and exercise        3. Encounter for screening mammogram for malignant neoplasm of breast  -     Mammo screening bilateral w 3d & cad; Future         There are no Patient Instructions on file for this visit.        Chief Complaint     Chief Complaint   Patient presents with    Follow-up     Toe nail fungus       History of Present Illness   Alina Sharma is a 53 y.o.-year-old female who is here for great toe thick, yellow and loose  Hx of this in past   Has used terbinafin orally but keeps coming back  Also was on saxenda, would like to use wegovy for her weight loss      Review of Systems   Review of Systems    Active Problem List     Patient Active Problem List   Diagnosis    Anxiety    Migraine    Depression    Meningioma (HCC)    Constipation by delayed colonic transit    Allergic rhinitis due to pollen    Nephrolithiasis    Vitamin D deficiency    Family history of colon cancer    Herpes genitalis in women    Personal history of colonic polyps    Perimenopause    Onychomycosis of great toe    Dermatochalasis of both upper eyelids    Overweight (BMI 25.0-29.9)         Past Medical History:  Past Medical History:   Diagnosis Date    Anxiety     Asthma     Last assessed 2014    Colon polyp     Constipation     Eczema     Irritable bowel syndrome (IBS)     Constipation    Seasonal allergies     [place] teeth extracted        Past Surgical History:  Past Surgical History:   Procedure Laterality Date    COLONOSCOPY      HYSTERECTOMY      LAPAROSCOPY FOR ECTOPIC PREGNANCY      TX BLEPHAROPLASTY UPPER EYELID Bilateral 2023    Procedure: BILATERAL  BLEPHAROPLASTY UPPER;  Surgeon: Soraya German DO;  Location:  MAIN OR;  Service: Plastics    TONSILLECTOMY AND ADENOIDECTOMY      WISDOM TOOTH EXTRACTION         Family History:  Family History   Problem Relation Age of Onset    Hypertension Mother     Colon cancer Father 48    No Known Problems Sister     No Known Problems Daughter     No Known Problems Maternal Grandmother     No Known Problems Maternal Grandfather     No Known Problems Paternal Grandmother     No Known Problems Paternal Grandfather     No Known Problems Maternal Aunt        Social History:  Social History     Socioeconomic History    Marital status: /Civil Union     Spouse name: Not on file    Number of children: Not on file    Years of education: Not on file    Highest education level: Not on file   Occupational History    Not on file   Tobacco Use    Smoking status: Former     Current packs/day: 0.00     Average packs/day: 0.3 packs/day for 10.6 years (2.7 ttl pk-yrs)     Types: Cigarettes     Start date:      Quit date: 1993     Years since quittin.6    Smokeless tobacco: Never   Vaping Use    Vaping status: Never Used   Substance and Sexual Activity    Alcohol use: Yes     Alcohol/week: 3.0 standard drinks of alcohol     Types: 3 Glasses of wine per week     Comment: Social    Drug use: Never    Sexual activity: Yes     Partners: Male   Other Topics Concern    Not on file   Social History Narrative    Not on file     Social Determinants of Health     Financial Resource Strain: Not on file   Food Insecurity: Not on file   Transportation Needs: Not on file   Physical Activity: Not on file   Stress: Not on file   Social Connections: Not on file   Intimate Partner Violence: Not on file   Housing Stability: Not on file       Objective     Vitals:    24 1424   BP: 135/84   Pulse: 79   Resp: 16   Temp: (!) 97.2 °F (36.2 °C)   SpO2: 98%     Wt Readings from Last 3 Encounters:   24 72 kg (158 lb 12.8 oz)  "  01/12/24 68 kg (150 lb)   12/21/23 68 kg (150 lb)       Physical Exam    Pertinent Laboratory/Diagnostic Studies:  Lab Results   Component Value Date    GLUCOSE 98 04/25/2014    BUN 17 12/04/2023    CREATININE 0.88 12/04/2023    CALCIUM 9.3 12/04/2023     04/25/2014    K 4.0 12/04/2023    CO2 29 12/04/2023     12/04/2023     Lab Results   Component Value Date    ALT 17 05/29/2022    AST 21 05/29/2022    ALKPHOS 39 (L) 05/29/2022    BILITOT 0.50 04/25/2014       Lab Results   Component Value Date    WBC 5.50 12/04/2023    HGB 13.6 12/04/2023    HCT 41.6 12/04/2023     (H) 12/04/2023     12/04/2023       No results found for: \"TSH\"    Lab Results   Component Value Date    CHOL 232 06/26/2015     Lab Results   Component Value Date    TRIG 62 05/06/2023     Lab Results   Component Value Date     05/06/2023     Lab Results   Component Value Date    LDLCALC 144 (H) 05/06/2023     Lab Results   Component Value Date    HGBA1C 5.2 05/06/2023       Results for orders placed or performed in visit on 12/04/23   CBC and differential   Result Value Ref Range    WBC 5.50 4.31 - 10.16 Thousand/uL    RBC 4.13 3.81 - 5.12 Million/uL    Hemoglobin 13.6 11.5 - 15.4 g/dL    Hematocrit 41.6 34.8 - 46.1 %     (H) 82 - 98 fL    MCH 32.9 26.8 - 34.3 pg    MCHC 32.7 31.4 - 37.4 g/dL    RDW 12.2 11.6 - 15.1 %    MPV 10.2 8.9 - 12.7 fL    Platelets 263 149 - 390 Thousands/uL    nRBC 0 /100 WBCs    Neutrophils Relative 57 43 - 75 %    Immature Grans % 0 0 - 2 %    Lymphocytes Relative 30 14 - 44 %    Monocytes Relative 9 4 - 12 %    Eosinophils Relative 3 0 - 6 %    Basophils Relative 1 0 - 1 %    Neutrophils Absolute 3.16 1.85 - 7.62 Thousands/µL    Absolute Immature Grans 0.01 0.00 - 0.20 Thousand/uL    Absolute Lymphocytes 1.62 0.60 - 4.47 Thousands/µL    Absolute Monocytes 0.50 0.17 - 1.22 Thousand/µL    Eosinophils Absolute 0.15 0.00 - 0.61 Thousand/µL    Basophils Absolute 0.06 0.00 - 0.10 " "Thousands/µL   Basic metabolic panel   Result Value Ref Range    Sodium 139 135 - 147 mmol/L    Potassium 4.0 3.5 - 5.3 mmol/L    Chloride 103 96 - 108 mmol/L    CO2 29 21 - 32 mmol/L    ANION GAP 7 mmol/L    BUN 17 5 - 25 mg/dL    Creatinine 0.88 0.60 - 1.30 mg/dL    Glucose, Fasting 83 65 - 99 mg/dL    Calcium 9.3 8.4 - 10.2 mg/dL    eGFR 74 ml/min/1.73sq m       Orders Placed This Encounter   Procedures    Mammo screening bilateral w 3d & cad    Ambulatory Referral to Podiatry       ALLERGIES:  No Known Allergies    Current Medications     Current Outpatient Medications   Medication Sig Dispense Refill    ALPRAZolam (XANAX) 0.25 mg tablet Take 1 tablet (0.25 mg total) by mouth 3 (three) times a day as needed for anxiety 30 tablet 0    b complex vitamins capsule Take 1 capsule by mouth daily      B-D ALLERGY SYRINGE 1CC/28G 28G X 1/2\" 1 ML MISC   2    Cholecalciferol (VITAMIN D3) 2000 units capsule Take 2 capsules by mouth daily        citalopram (CeleXA) 40 mg tablet Take 1 tablet (40 mg total) by mouth daily 90 tablet 0    cyclobenzaprine (FLEXERIL) 10 mg tablet TAKE ONE TABLET BY MOUTH 3 TIMES A DAY AS NEEDED FOR MUSCLE SPASMS (Patient taking differently: Take 10 mg by mouth 3 (three) times a day as needed for muscle spasms) 45 tablet 0    Dulera 100-5 MCG/ACT inhaler Inhale 2 puffs 2 (two) times a day 13 g 3    Dupixent subcutaneous injection Inject 2 mL (300 mg total) under the skin every 21 days 2 mL 3    estradiol (Estrace) 1 mg tablet Take 1.5 pills by mouth daily 90 tablet 4    fluticasone (FLONASE) 50 mcg/act nasal spray 2 sprays into each nostril every morning 11 mL 0    gentamicin (GARAMYCIN) 0.3 % ophthalmic solution Administer 1 drop to both eyes 4 (four) times a day 5 mL 0    INSULIN SYRINGE 1CC/29G 29G X 1/2\" 1 ML MISC       MAGNESIUM PO Take 1 capsule by mouth daily      Mirabegron ER 50 MG TB24 Take 1 tablet (50 mg total) by mouth in the morning (Patient taking differently: Take 50 mg by mouth " every morning) 30 tablet 11    Progesterone 100 MG CAPS Take 100 mg by mouth at bedtime (Patient taking differently: Take 100 mg by mouth daily at bedtime) 30 capsule 11    Prucalopride Succinate 2 MG TABS Take 2 mg by mouth daily (Patient taking differently: Take 2 mg by mouth every morning) 90 tablet 3    Restasis 0.05 % ophthalmic emulsion Administer 1 drop to both eyes every 12 (twelve) hours      tobramycin-dexamethasone (TOBRADEX) ophthalmic suspension Administer 1 drop to both eyes if needed      TURMERIC PO Take 1 capsule by mouth daily      valACYclovir (VALTREX) 500 mg tablet Take 1 tablet (500 mg total) by mouth every morning 90 tablet 3    Ventolin  (90 Base) MCG/ACT inhaler Inhale 2 puffs every 4 (four) hours as needed for wheezing      Wegovy 1 MG/0.5ML Inject 0.5 mL (1 mg total) under the skin once a week. 2 mL 0     No current facility-administered medications for this visit.         Health Maintenance     Health Maintenance   Topic Date Due    Depression Screening  12/28/2021    Annual Physical  03/18/2023    Hepatitis C Screening  04/18/2024 (Originally 1970)    COVID-19 Vaccine (7 - 2023-24 season) 06/27/2024 (Originally 9/1/2023)    Influenza Vaccine (1) 06/30/2024 (Originally 9/1/2023)    Zoster Vaccine (1 of 2) 03/27/2025 (Originally 11/7/2019)    Pneumococcal Vaccine: Pediatrics (0 to 5 Years) and At-Risk Patients (6 to 64 Years) (1 of 2 - PCV) 03/27/2025 (Originally 11/7/1975)    HIV Screening  03/27/2026 (Originally 11/7/1984)    Breast Cancer Screening: Mammogram  01/12/2026    Colorectal Cancer Screening  09/16/2030    DTaP,Tdap,and Td Vaccines (2 - Td or Tdap) 06/18/2031    HIB Vaccine  Aged Out    IPV Vaccine  Aged Out    Hepatitis A Vaccine  Aged Out    Meningococcal ACWY Vaccine  Aged Out    HPV Vaccine  Aged Out     Immunization History   Administered Date(s) Administered    COVID-19 PFIZER VACCINE 0.3 ML IM 12/23/2020, 01/12/2021, 08/21/2021, 02/05/2022    COVID-19 Pfizer  Vac BIVALENT Santana-sucrose 12 Yr+ IM 10/27/2022, 10/27/2022    H1N1, All Formulations 11/02/2009    INFLUENZA 10/24/2019, 11/08/2020    Influenza, seasonal, injectable 10/15/2013    Influenza, seasonal, injectable, preservative free 10/24/2019    Tdap 06/18/2021          ANIVAL Connelly

## 2024-03-22 NOTE — TELEPHONE ENCOUNTER
PA for Wegovy Approved     Date(s) approved 3- - 3-        Patient advised by [x] [employer] Message                      [] Phone call       Pharmacy advised by [x]Fax                                     []Phone call    Approval letter scanned into Media Yes

## 2024-03-25 ENCOUNTER — APPOINTMENT (OUTPATIENT)
Dept: LAB | Age: 54
End: 2024-03-25

## 2024-03-25 DIAGNOSIS — Z01.84 IMMUNITY STATUS TESTING: ICD-10-CM

## 2024-03-25 DIAGNOSIS — Z11.1 SCREENING EXAMINATION FOR PULMONARY TUBERCULOSIS: ICD-10-CM

## 2024-03-25 PROCEDURE — 86480 TB TEST CELL IMMUN MEASURE: CPT

## 2024-03-25 PROCEDURE — 86787 VARICELLA-ZOSTER ANTIBODY: CPT

## 2024-03-25 PROCEDURE — 36415 COLL VENOUS BLD VENIPUNCTURE: CPT

## 2024-03-26 LAB — VZV IGG SER QL IA: NORMAL

## 2024-03-27 PROBLEM — E66.3 OVERWEIGHT (BMI 25.0-29.9): Status: ACTIVE | Noted: 2024-03-27

## 2024-03-27 LAB
GAMMA INTERFERON BACKGROUND BLD IA-ACNC: 0.04 IU/ML
M TB IFN-G BLD-IMP: NEGATIVE
M TB IFN-G CD4+ BCKGRND COR BLD-ACNC: 0.03 IU/ML
M TB IFN-G CD4+ BCKGRND COR BLD-ACNC: 0.04 IU/ML
MITOGEN IGNF BCKGRD COR BLD-ACNC: 9.96 IU/ML

## 2024-04-08 DIAGNOSIS — E66.3 OVERWEIGHT (BMI 25.0-29.9): ICD-10-CM

## 2024-04-08 RX ORDER — SEMAGLUTIDE 1 MG/.5ML
1 INJECTION, SOLUTION SUBCUTANEOUS WEEKLY
Qty: 2 ML | Refills: 0 | Status: SHIPPED | OUTPATIENT
Start: 2024-04-08 | End: 2024-04-09 | Stop reason: SDUPTHER

## 2024-04-09 DIAGNOSIS — E66.3 OVERWEIGHT (BMI 25.0-29.9): ICD-10-CM

## 2024-04-09 RX ORDER — SEMAGLUTIDE 1 MG/.5ML
1 INJECTION, SOLUTION SUBCUTANEOUS WEEKLY
Qty: 2 ML | Refills: 0 | Status: SHIPPED | OUTPATIENT
Start: 2024-04-09

## 2024-04-09 NOTE — TELEPHONE ENCOUNTER
NOT a duplicate request.  Pt changed preferred pharmacy.    Alina Sharma   to [facility] (supporting ANIVAL Connelly)         4/8/24  6:28 PM  Here we go again I’m so sorry. I got a message that [facility] is out of we Govee and unable to fill the prescription that was sent to them today. Could you call in the prescription for 1 mg of we Govee to [facility] instead.

## 2024-04-18 ENCOUNTER — APPOINTMENT (OUTPATIENT)
Dept: RADIOLOGY | Age: 54
End: 2024-04-18
Payer: COMMERCIAL

## 2024-04-18 ENCOUNTER — APPOINTMENT (OUTPATIENT)
Dept: LAB | Age: 54
End: 2024-04-18
Payer: COMMERCIAL

## 2024-04-18 DIAGNOSIS — N20.0 KIDNEY STONES: ICD-10-CM

## 2024-04-18 DIAGNOSIS — Z00.8 HEALTH EXAMINATION IN POPULATION SURVEY: ICD-10-CM

## 2024-04-18 PROCEDURE — 36415 COLL VENOUS BLD VENIPUNCTURE: CPT

## 2024-04-18 PROCEDURE — 74018 RADEX ABDOMEN 1 VIEW: CPT

## 2024-04-18 PROCEDURE — 83036 HEMOGLOBIN GLYCOSYLATED A1C: CPT

## 2024-04-18 PROCEDURE — 80061 LIPID PANEL: CPT

## 2024-04-19 LAB
CHOLEST SERPL-MCNC: 225 MG/DL
EST. AVERAGE GLUCOSE BLD GHB EST-MCNC: 111 MG/DL
HBA1C MFR BLD: 5.5 %
HDLC SERPL-MCNC: 91 MG/DL
LDLC SERPL CALC-MCNC: 120 MG/DL (ref 0–100)
NONHDLC SERPL-MCNC: 134 MG/DL
TRIGL SERPL-MCNC: 68 MG/DL

## 2024-04-25 ENCOUNTER — OFFICE VISIT (OUTPATIENT)
Dept: PODIATRY | Facility: CLINIC | Age: 54
End: 2024-04-25
Payer: COMMERCIAL

## 2024-04-25 VITALS — SYSTOLIC BLOOD PRESSURE: 126 MMHG | DIASTOLIC BLOOD PRESSURE: 88 MMHG | HEART RATE: 80 BPM

## 2024-04-25 DIAGNOSIS — B35.1 ONYCHOMYCOSIS OF GREAT TOE: ICD-10-CM

## 2024-04-25 PROCEDURE — 99213 OFFICE O/P EST LOW 20 MIN: CPT | Performed by: PODIATRIST

## 2024-04-25 NOTE — PROGRESS NOTES
Assessment/Plan:       Diagnoses and all orders for this visit:    Onychomycosis of great toe  -     Ambulatory Referral to Podiatry      Diagnosis and options discussed with patient  Patient agreeable to the plan as stated below    Patient asking for temporary removal of nail, this does not treat the fungus and it will just grow back.     We could do lamisil again but she politely declined.     Discussed efficacy of topical medication being low.     At our conversation she felt she could just keep thin and live with it which is fine    Subjective:      Patient ID: Alina Sharma is a 53 y.o. female.    Patient great toe has a fungal infection. She took lamisil years ago and it worked well but it has come back. No h/o liver issues or alcohol abuse        The following portions of the patient's history were reviewed and updated as appropriate: allergies, current medications, past family history, past medical history, past social history, past surgical history, and problem list.    Review of Systems    As stated in HPI, otherwise normal      Objective:      /88 (BP Location: Right arm, Patient Position: Sitting, Cuff Size: Standard)   Pulse 80          Physical Exam  Vitals reviewed.   Constitutional:       Appearance: She is not ill-appearing or diaphoretic.   Cardiovascular:      Rate and Rhythm: Normal rate.      Pulses: Normal pulses.           Dorsalis pedis pulses are 2+ on the right side and 2+ on the left side.        Posterior tibial pulses are 2+ on the right side and 2+ on the left side.   Pulmonary:      Effort: Pulmonary effort is normal. No respiratory distress.   Feet:      Right foot:      Toenail Condition: Right toenails are abnormally thick. Fungal disease present.  Skin:     Capillary Refill: Capillary refill takes less than 2 seconds.      Findings: No erythema or rash.   Neurological:      Mental Status: She is alert and oriented to person, place, and time.      Sensory: No sensory  deficit.      Gait: Gait normal.   Psychiatric:         Mood and Affect: Mood normal.

## 2024-05-03 DIAGNOSIS — E66.3 OVERWEIGHT (BMI 25.0-29.9): Primary | ICD-10-CM

## 2024-05-11 DIAGNOSIS — F32.A DEPRESSION, UNSPECIFIED DEPRESSION TYPE: ICD-10-CM

## 2024-05-12 RX ORDER — CITALOPRAM 40 MG/1
40 TABLET ORAL DAILY
Qty: 90 TABLET | Refills: 1 | Status: SHIPPED | OUTPATIENT
Start: 2024-05-12

## 2024-06-03 ENCOUNTER — PATIENT MESSAGE (OUTPATIENT)
Dept: DERMATOLOGY | Facility: CLINIC | Age: 54
End: 2024-06-03

## 2024-06-05 DIAGNOSIS — E66.3 OVERWEIGHT (BMI 25.0-29.9): ICD-10-CM

## 2024-06-06 ENCOUNTER — TELEPHONE (OUTPATIENT)
Age: 54
End: 2024-06-06

## 2024-06-06 DIAGNOSIS — L20.9 ATOPIC DERMATITIS, UNSPECIFIED TYPE: Primary | ICD-10-CM

## 2024-06-06 RX ORDER — SEMAGLUTIDE 1.7 MG/.75ML
INJECTION, SOLUTION SUBCUTANEOUS
Qty: 3 ML | Refills: 0 | Status: SHIPPED | OUTPATIENT
Start: 2024-06-06

## 2024-06-06 NOTE — TELEPHONE ENCOUNTER
Refill must be reviewed and completed by the office or provider. The refill is unable to be approved or denied by the medication management team.      Please review to see if the refill is appropriate.

## 2024-06-06 NOTE — TELEPHONE ENCOUNTER
PA for Dupixent 300mg pen     Submitted via    [x]CMM-KEY [id number]  [][employer]-Case ID #   []Faxed to plan   []Other website   []Phone call Case ID #     Office notes sent, clinical questions answered. Awaiting determination    Turnaround time for your insurance to make a decision on your Prior Authorization can take 7-21 business days.

## 2024-06-14 RX ORDER — DUPILUMAB 300 MG/2ML
INJECTION, SOLUTION SUBCUTANEOUS
Qty: 4 ML | Refills: 11 | Status: SHIPPED | OUTPATIENT
Start: 2024-06-14

## 2024-06-14 NOTE — TELEPHONE ENCOUNTER
PA for Dupixent 300mg pen  Approved     Date(s) approved 06/06/2024 - 06/06/2025    Case #[id number]    Patient advised by          [x] [employer] Message  [x] Phone call   []LMOM  []L/M to call office as no active Communication consent on file  []Unable to leave detailed message as VM not approved on Communication consent       Pharmacy advised by    [x]Fax  []Phone call    Approval letter scanned into Media Yes

## 2024-07-09 DIAGNOSIS — N95.1 MENOPAUSAL SYMPTOMS: ICD-10-CM

## 2024-07-10 RX ORDER — ESTRADIOL 1 MG/1
TABLET ORAL
Qty: 135 TABLET | Refills: 1 | Status: SHIPPED | OUTPATIENT
Start: 2024-07-10

## 2024-08-04 DIAGNOSIS — E66.3 OVERWEIGHT (BMI 25.0-29.9): ICD-10-CM

## 2024-08-04 RX ORDER — SEMAGLUTIDE 1.7 MG/.75ML
INJECTION, SOLUTION SUBCUTANEOUS
Qty: 3 ML | Refills: 0 | Status: SHIPPED | OUTPATIENT
Start: 2024-08-04

## 2024-08-07 DIAGNOSIS — L20.9 ATOPIC DERMATITIS, UNSPECIFIED TYPE: ICD-10-CM

## 2024-08-07 NOTE — TELEPHONE ENCOUNTER
Refill must be reviewed and completed by the office or provider. The refill is unable to be approved or denied by the medication management team.    Off protocol      Patient comment: I have Not been able to wait 21 days in between injections and returned to a14 day schedule. Please refill at this time

## 2024-08-08 DIAGNOSIS — E66.3 OVERWEIGHT (BMI 25.0-29.9): ICD-10-CM

## 2024-08-08 RX ORDER — DUPILUMAB 300 MG/2ML
300 INJECTION, SOLUTION SUBCUTANEOUS
Qty: 4 ML | Refills: 12 | Status: SHIPPED | OUTPATIENT
Start: 2024-08-08 | End: 2025-08-08

## 2024-08-08 NOTE — TELEPHONE ENCOUNTER
Please see patient response to refill of Dupixent. would you like pt to have labs before refill is granted? if so please add the order. Thank you!

## 2024-08-12 DIAGNOSIS — J45.20 MILD INTERMITTENT ASTHMA WITHOUT COMPLICATION: ICD-10-CM

## 2024-08-12 RX ORDER — SEMAGLUTIDE 1.7 MG/.75ML
INJECTION, SOLUTION SUBCUTANEOUS
Qty: 3 ML | Refills: 0 | OUTPATIENT
Start: 2024-08-12

## 2024-08-13 RX ORDER — MOMETASONE FUROATE AND FORMOTEROL FUMARATE DIHYDRATE 100; 5 UG/1; UG/1
2 AEROSOL RESPIRATORY (INHALATION) 2 TIMES DAILY
Qty: 13 G | Refills: 3 | Status: SHIPPED | OUTPATIENT
Start: 2024-08-13

## 2024-09-11 DIAGNOSIS — E66.3 OVERWEIGHT (BMI 25.0-29.9): ICD-10-CM

## 2024-09-11 NOTE — TELEPHONE ENCOUNTER
Requested medication(s) are due for refill today: No  Patient has already received a courtesy refill: No  Other reason request has been forwarded to provider: PLEASE REFUSE DUPLICATE

## 2024-09-13 RX ORDER — SEMAGLUTIDE 1.7 MG/.75ML
1.7 INJECTION, SOLUTION SUBCUTANEOUS WEEKLY
Qty: 3 ML | Refills: 5 | Status: SHIPPED | OUTPATIENT
Start: 2024-09-13

## 2024-10-08 ENCOUNTER — TELEPHONE (OUTPATIENT)
Age: 54
End: 2024-10-08

## 2024-10-08 DIAGNOSIS — D32.9 MENINGIOMA (HCC): Primary | ICD-10-CM

## 2024-10-08 DIAGNOSIS — Z00.6 ENCOUNTER FOR EXAMINATION FOR NORMAL COMPARISON OR CONTROL IN CLINICAL RESEARCH PROGRAM: ICD-10-CM

## 2024-10-08 NOTE — TELEPHONE ENCOUNTER
Pt called back and set AP appt following CT scan in [place] office with Hayley on 11/8/24 at 3p

## 2024-10-08 NOTE — TELEPHONE ENCOUNTER
Placed new order, called patient to make her aware. All questions answered at this time, she was appreciative of the assistance

## 2024-10-08 NOTE — TELEPHONE ENCOUNTER
PT CALLED REQUESTING UPDATED CT HEAD W WO FOR 5YR FOLLOW UP APPT PER LOV 10/31/2019 MINGER/DKO.    Return in about 5 years (around 10/31/2024) for Follow-up in 5 years, review CT head with without contrast.    PLEASE NOTIFY PT WHEN CT ORDER IS UPDATED SO SHE CAN CALL CENTRAL SCHEDULING AND THEN CB FOR SNPX /DKO PER RECALL.    THANK YOU

## 2024-10-17 ENCOUNTER — HOSPITAL ENCOUNTER (OUTPATIENT)
Dept: RADIOLOGY | Facility: HOSPITAL | Age: 54
Discharge: HOME | End: 2024-10-17
Attending: NEUROLOGICAL SURGERY
Payer: COMMERCIAL

## 2024-10-17 ENCOUNTER — APPOINTMENT (OUTPATIENT)
Dept: LAB | Facility: CLINIC | Age: 54
End: 2024-10-17
Payer: COMMERCIAL

## 2024-10-17 DIAGNOSIS — R11.0 NAUSEA: Primary | ICD-10-CM

## 2024-10-17 DIAGNOSIS — Z00.6 ENCOUNTER FOR EXAMINATION FOR NORMAL COMPARISON OR CONTROL IN CLINICAL RESEARCH PROGRAM: ICD-10-CM

## 2024-10-17 DIAGNOSIS — D32.9 MENINGIOMA (HCC): ICD-10-CM

## 2024-10-17 PROCEDURE — 70470 CT HEAD/BRAIN W/O & W/DYE: CPT

## 2024-10-17 PROCEDURE — 36415 COLL VENOUS BLD VENIPUNCTURE: CPT

## 2024-10-17 RX ORDER — ONDANSETRON 4 MG/1
4 TABLET, FILM COATED ORAL EVERY 8 HOURS PRN
Qty: 20 TABLET | Refills: 0 | Status: SHIPPED | OUTPATIENT
Start: 2024-10-17

## 2024-10-17 RX ADMIN — IOHEXOL 75 ML: 350 INJECTION, SOLUTION INTRAVENOUS at 18:52

## 2024-10-20 DIAGNOSIS — Z00.6 ENCOUNTER FOR EXAMINATION FOR NORMAL COMPARISON OR CONTROL IN CLINICAL RESEARCH PROGRAM: ICD-10-CM

## 2024-10-25 ENCOUNTER — OFFICE VISIT (OUTPATIENT)
Dept: FAMILY MEDICINE CLINIC | Facility: CLINIC | Age: 54
End: 2024-10-25
Payer: COMMERCIAL

## 2024-10-25 VITALS
TEMPERATURE: 98.4 F | HEART RATE: 77 BPM | HEIGHT: 65 IN | RESPIRATION RATE: 17 BRPM | BODY MASS INDEX: 24.16 KG/M2 | SYSTOLIC BLOOD PRESSURE: 120 MMHG | DIASTOLIC BLOOD PRESSURE: 80 MMHG | OXYGEN SATURATION: 98 % | WEIGHT: 145 LBS

## 2024-10-25 DIAGNOSIS — F41.9 ANXIETY: ICD-10-CM

## 2024-10-25 DIAGNOSIS — E66.3 OVERWEIGHT (BMI 25.0-29.9): ICD-10-CM

## 2024-10-25 DIAGNOSIS — R51.9 NONINTRACTABLE EPISODIC HEADACHE, UNSPECIFIED HEADACHE TYPE: Primary | ICD-10-CM

## 2024-10-25 DIAGNOSIS — F32.A DEPRESSION, UNSPECIFIED DEPRESSION TYPE: ICD-10-CM

## 2024-10-25 PROCEDURE — 99214 OFFICE O/P EST MOD 30 MIN: CPT | Performed by: NURSE PRACTITIONER

## 2024-10-25 RX ORDER — CITALOPRAM HYDROBROMIDE 20 MG/1
20 TABLET ORAL DAILY
Qty: 90 TABLET | Refills: 3 | Status: SHIPPED | OUTPATIENT
Start: 2024-10-25 | End: 2024-10-25 | Stop reason: SDUPTHER

## 2024-10-25 RX ORDER — SEMAGLUTIDE 1 MG/.5ML
INJECTION, SOLUTION SUBCUTANEOUS
Qty: 2 ML | Refills: 0 | Status: SHIPPED | OUTPATIENT
Start: 2024-10-25

## 2024-10-25 RX ORDER — LEVOCETIRIZINE DIHYDROCHLORIDE 5 MG/1
TABLET, FILM COATED ORAL
COMMUNITY
Start: 2024-10-05

## 2024-10-25 RX ORDER — CITALOPRAM HYDROBROMIDE 20 MG/1
20 TABLET ORAL DAILY
Qty: 90 TABLET | Refills: 3 | Status: SHIPPED | OUTPATIENT
Start: 2024-10-25

## 2024-10-25 RX ORDER — BUTALBITAL, ACETAMINOPHEN AND CAFFEINE 300; 40; 50 MG/1; MG/1; MG/1
1 CAPSULE ORAL DAILY PRN
Qty: 20 CAPSULE | Refills: 0 | Status: SHIPPED | OUTPATIENT
Start: 2024-10-25

## 2024-10-25 NOTE — PROGRESS NOTES
FAMILY PRACTICE OFFICE VISIT       NAME: Alina Sharma  AGE: 53 y.o. SEX: female       : 1970        MRN: [id number]    DATE: 10/26/2024  TIME: 11:29 AM    Assessment and Plan   1. Nonintractable episodic headache, unspecified headache type  Assessment & Plan:  Fioricet prn, use sparingly as they may cause rebound ha    Orders:  -     Butalbital-APAP-Caffeine (Fioricet) -40 MG CAPS; Take 1 tablet by mouth daily as needed (ha)  2. Overweight (BMI 25.0-29.9)  Assessment & Plan:  Decrease dose of wegovy to 1mg weekly      Orders:  -     Semaglutide-Weight Management (Wegovy) 1 MG/0.5ML; Inject 1 mg under the skin weekly  3. Depression, unspecified depression type  Assessment & Plan:  Decrease citalopram to 10mg daily    Orders:  -     citalopram (CeleXA) 20 mg tablet; Take 1 tablet (20 mg total) by mouth daily  4. Anxiety  Assessment & Plan:  Decrease citalopram to 10mg daily      Orders:  -     citalopram (CeleXA) 20 mg tablet; Take 1 tablet (20 mg total) by mouth daily       There are no Patient Instructions on file for this visit.        Chief Complaint     Chief Complaint   Patient presents with    Follow-up     Patient being seen for follow up-weaning off Celexa, Wegovy would like to go to 1mg       History of Present Illness   Alina Sharma is a 53 y.o.-year-old female who is here for f/u to obesity  Doing well on wegovy but getting some nausea on 1.7mg   Will go down to 1mg  Doing well with her anxiety and depression  Had weaned down from 40mg to 20mg   Now wants to go to 10mg  Sent new script  Asthma and allergies under control  Getting tension ha's      Review of Systems   Review of Systems   Constitutional:  Negative for fatigue and fever.   HENT:  Negative for congestion, postnasal drip and rhinorrhea.    Eyes:  Negative for photophobia and visual disturbance.   Respiratory:  Negative for cough, shortness of breath and wheezing.    Cardiovascular:  Negative for chest pain and palpitations.    Gastrointestinal:  Negative for constipation, diarrhea, nausea and vomiting.   Genitourinary:  Negative for dysuria and frequency.   Musculoskeletal:  Negative for arthralgias and myalgias.   Skin:  Negative for rash.   Neurological:  Negative for dizziness, light-headedness and headaches.   Hematological:  Negative for adenopathy.   Psychiatric/Behavioral:  Negative for dysphoric mood and sleep disturbance. The patient is not nervous/anxious.        Active Problem List     Patient Active Problem List   Diagnosis    Anxiety    Migraine    Depression    Meningioma (HCC)    Constipation by delayed colonic transit    Allergic rhinitis due to pollen    Nephrolithiasis    Vitamin D deficiency    Family history of colon cancer    Herpes genitalis in women    Personal history of colonic polyps    Perimenopause    Onychomycosis of great toe    Dermatochalasis of both upper eyelids    Overweight (BMI 25.0-29.9)    Nonintractable episodic headache         Past Medical History:  Past Medical History:   Diagnosis Date    Allergic 1974    Mold mildew dust    Anxiety     Asthma     Last assessed 2014    Colon polyp     Constipation     Depression     First pregnancy    Eczema     Irritable bowel syndrome (IBS)     Constipation    Kidney stone Unknown    See chart    Seasonal allergies     [place] teeth extracted        Past Surgical History:  Past Surgical History:   Procedure Laterality Date    COLONOSCOPY      HYSTERECTOMY      LAPAROSCOPY FOR ECTOPIC PREGNANCY      OR BLEPHAROPLASTY UPPER EYELID Bilateral 2023    Procedure: BILATERAL BLEPHAROPLASTY UPPER;  Surgeon: Soraya German DO;  Location:  MAIN OR;  Service: Plastics    TONSILLECTOMY AND ADENOIDECTOMY      WISDOM TOOTH EXTRACTION         Family History:  Family History   Problem Relation Age of Onset    Hypertension Mother     Colon cancer Father 48    Cancer Father          colon    No Known Problems Sister     No Known Problems  Daughter     No Known Problems Maternal Grandmother     No Known Problems Maternal Grandfather     No Known Problems Paternal Grandmother     No Known Problems Paternal Grandfather     No Known Problems Maternal Aunt        Social History:  Social History     Socioeconomic History    Marital status: /Civil Union     Spouse name: Not on file    Number of children: Not on file    Years of education: Not on file    Highest education level: Not on file   Occupational History    Not on file   Tobacco Use    Smoking status: Former     Current packs/day: 0.00     Average packs/day: 0.3 packs/day for 10.6 years (2.7 ttl pk-yrs)     Types: Cigarettes     Start date:      Quit date: 1993     Years since quittin.2     Passive exposure: Never    Smokeless tobacco: Never   Vaping Use    Vaping status: Never Used   Substance and Sexual Activity    Alcohol use: Yes     Alcohol/week: 1.0 standard drink of alcohol     Types: 1 Glasses of wine per week     Comment: Social    Drug use: Never    Sexual activity: Yes     Partners: Male     Birth control/protection: Post-menopausal, Male Sterilization   Other Topics Concern    Not on file   Social History Narrative    Not on file     Social Determinants of Health     Financial Resource Strain: Not on file   Food Insecurity: Not on file   Transportation Needs: Not on file   Physical Activity: Not on file   Stress: Not on file   Social Connections: Not on file   Intimate Partner Violence: Not on file   Housing Stability: Not on file       Objective     Vitals:    10/25/24 1529   BP: 120/80   Pulse: 77   Resp: 17   Temp: 98.4 °F (36.9 °C)   SpO2: 98%     Wt Readings from Last 3 Encounters:   10/25/24 65.8 kg (145 lb)   24 72 kg (158 lb 12.8 oz)   24 68 kg (150 lb)       Physical Exam  Vitals and nursing note reviewed.   Constitutional:       Appearance: Normal appearance.   HENT:      Head: Normocephalic and atraumatic.   Eyes:      Conjunctiva/sclera:  "Conjunctivae normal.   Cardiovascular:      Rate and Rhythm: Normal rate and regular rhythm.      Heart sounds: Normal heart sounds.   Pulmonary:      Effort: Pulmonary effort is normal.      Breath sounds: Normal breath sounds.   Abdominal:      General: Bowel sounds are normal.      Palpations: Abdomen is soft.   Musculoskeletal:         General: Normal range of motion.      Cervical back: Normal range of motion and neck supple.   Skin:     General: Skin is warm and dry.      Capillary Refill: Capillary refill takes less than 2 seconds.   Neurological:      General: No focal deficit present.      Mental Status: She is alert and oriented to person, place, and time.   Psychiatric:         Mood and Affect: Mood normal.         Behavior: Behavior normal.         Pertinent Laboratory/Diagnostic Studies:  Lab Results   Component Value Date    GLUCOSE 98 04/25/2014    BUN 17 12/04/2023    CREATININE 0.88 12/04/2023    CALCIUM 9.3 12/04/2023     04/25/2014    K 4.0 12/04/2023    CO2 29 12/04/2023     12/04/2023     Lab Results   Component Value Date    ALT 17 05/29/2022    AST 21 05/29/2022    ALKPHOS 39 (L) 05/29/2022    BILITOT 0.50 04/25/2014       Lab Results   Component Value Date    WBC 5.50 12/04/2023    HGB 13.6 12/04/2023    HCT 41.6 12/04/2023     (H) 12/04/2023     12/04/2023       No results found for: \"TSH\"    Lab Results   Component Value Date    CHOL 232 06/26/2015     Lab Results   Component Value Date    TRIG 68 04/18/2024     Lab Results   Component Value Date    HDL 91 04/18/2024     Lab Results   Component Value Date    LDLCALC 120 (H) 04/18/2024     Lab Results   Component Value Date    HGBA1C 5.5 04/18/2024       Results for orders placed or performed in visit on 04/18/24   Hemoglobin A1C    Collection Time: 04/18/24  1:42 PM   Result Value Ref Range    Hemoglobin A1C 5.5 Normal 4.0-5.6%; PreDiabetic 5.7-6.4%; Diabetic >=6.5%; Glycemic control for adults with diabetes <7.0% " "%     mg/dl   Lipid panel    Collection Time: 04/18/24  1:42 PM   Result Value Ref Range    Cholesterol 225 (H) See Comment mg/dL    Triglycerides 68 See Comment mg/dL    HDL, Direct 91 >=50 mg/dL    LDL Calculated 120 (H) 0 - 100 mg/dL    Non-HDL-Chol (CHOL-HDL) 134 mg/dl       No orders of the defined types were placed in this encounter.      ALLERGIES:  No Known Allergies    Current Medications     Current Outpatient Medications   Medication Sig Dispense Refill    ALPRAZolam (XANAX) 0.25 mg tablet Take 1 tablet (0.25 mg total) by mouth 3 (three) times a day as needed for anxiety 30 tablet 0    b complex vitamins capsule Take 1 capsule by mouth daily      B-D ALLERGY SYRINGE 1CC/28G 28G X 1/2\" 1 ML MISC   2    Butalbital-APAP-Caffeine (Fioricet) -40 MG CAPS Take 1 tablet by mouth daily as needed (ha) 20 capsule 0    Cholecalciferol (VITAMIN D3) 2000 units capsule Take 2 capsules by mouth daily        citalopram (CeleXA) 20 mg tablet Take 1 tablet (20 mg total) by mouth daily 90 tablet 3    cyclobenzaprine (FLEXERIL) 10 mg tablet TAKE ONE TABLET BY MOUTH 3 TIMES A DAY AS NEEDED FOR MUSCLE SPASMS (Patient taking differently: Take 10 mg by mouth 3 (three) times a day as needed for muscle spasms) 45 tablet 0    Dulera 100-5 MCG/ACT inhaler Inhale 2 puffs 2 (two) times a day 13 g 3    Dupixent subcutaneous injection Inject 2 mL (300 mg total) under the skin every 14 (fourteen) days 4 mL 12    estradiol (ESTRACE) 1 mg tablet TAKE 1 1/2 TABLETS BY MOUTH DAILY 135 tablet 1    fluticasone (FLONASE) 50 mcg/act nasal spray 2 sprays into each nostril every morning 11 mL 0    INSULIN SYRINGE 1CC/29G 29G X 1/2\" 1 ML MISC       levocetirizine (XYZAL) 5 MG tablet       MAGNESIUM PO Take 1 capsule by mouth daily      ondansetron (ZOFRAN) 4 mg tablet Take 1 tablet (4 mg total) by mouth every 8 (eight) hours as needed for nausea or vomiting 20 tablet 0    Progesterone 100 MG CAPS Take 100 mg by mouth at bedtime (Patient " taking differently: Take 100 mg by mouth daily at bedtime) 30 capsule 11    Prucalopride Succinate 2 MG TABS Take 2 mg by mouth daily (Patient taking differently: Take 2 mg by mouth every morning) 90 tablet 3    Restasis 0.05 % ophthalmic emulsion Administer 1 drop to both eyes every 12 (twelve) hours      Semaglutide-Weight Management (Wegovy) 1 MG/0.5ML Inject 1 mg under the skin weekly 2 mL 0    tobramycin-dexamethasone (TOBRADEX) ophthalmic suspension Administer 1 drop to both eyes if needed      TURMERIC PO Take 1 capsule by mouth daily      valACYclovir (VALTREX) 500 mg tablet Take 1 tablet (500 mg total) by mouth every morning 90 tablet 3    Ventolin  (90 Base) MCG/ACT inhaler Inhale 2 puffs every 4 (four) hours as needed for wheezing       No current facility-administered medications for this visit.         Health Maintenance     Health Maintenance   Topic Date Due    Hepatitis C Screening  Never done    Annual Physical  03/18/2023    Influenza Vaccine (1) 09/01/2024    Zoster Vaccine (1 of 2) 03/27/2025 (Originally 11/7/2019)    Pneumococcal Vaccine: Pediatrics (0 to 5 Years) and At-Risk Patients (6 to 64 Years) (1 of 2 - PCV) 03/27/2025 (Originally 11/7/1975)    HIV Screening  03/27/2026 (Originally 11/7/1984)    Depression Screening  10/25/2025    Breast Cancer Screening: Mammogram  01/12/2026    RSV Vaccine Age 60+ Years (1 - 1-dose 60+ series) 11/07/2029    Colorectal Cancer Screening  09/16/2030    DTaP,Tdap,and Td Vaccines (2 - Td or Tdap) 06/18/2031    COVID-19 Vaccine  Completed    RSV Vaccine age 0-20 Months  Aged Out    HIB Vaccine  Aged Out    IPV Vaccine  Aged Out    Hepatitis A Vaccine  Aged Out    Meningococcal ACWY Vaccine  Aged Out    HPV Vaccine  Aged Out    Cervical Cancer Screening  Discontinued     Immunization History   Administered Date(s) Administered    COVID-19 Moderna mRNA Vaccine 12 Yr+ 50 mcg/0.5 mL (Spikevax) 10/10/2024    COVID-19 PFIZER VACCINE 0.3 ML IM 12/23/2020,  01/12/2021, 08/21/2021, 02/05/2022    COVID-19 Pfizer Vac BIVALENT Santana-sucrose 12 Yr+ IM 10/27/2022, 10/27/2022    H1N1, All Formulations 11/02/2009    INFLUENZA 10/24/2019, 11/08/2020    Influenza, seasonal, injectable 10/15/2013    Influenza, seasonal, injectable, preservative free 10/24/2019    Tdap 06/18/2021       Depression Screening and Follow-up Plan: Patient was screened for depression during today's encounter. They screened negative with a PHQ-9 score of 0.        ANIVAL Connelly

## 2024-10-26 PROBLEM — R51.9 NONINTRACTABLE EPISODIC HEADACHE: Status: ACTIVE | Noted: 2024-10-26

## 2024-10-27 LAB
APOB+LDLR+PCSK9 GENE MUT ANL BLD/T: NOT DETECTED
BRCA1+BRCA2 DEL+DUP + FULL MUT ANL BLD/T: NOT DETECTED
MLH1+MSH2+MSH6+PMS2 GN DEL+DUP+FUL M: NOT DETECTED

## 2024-11-05 DIAGNOSIS — J30.1 ALLERGIC RHINITIS DUE TO POLLEN, UNSPECIFIED SEASONALITY: Primary | ICD-10-CM

## 2024-11-06 ENCOUNTER — TELEPHONE (OUTPATIENT)
Age: 54
End: 2024-11-06

## 2024-11-06 RX ORDER — LEVOCETIRIZINE DIHYDROCHLORIDE 5 MG/1
5 TABLET, FILM COATED ORAL EVERY EVENING
Qty: 90 TABLET | Refills: 1 | Status: SHIPPED | OUTPATIENT
Start: 2024-11-06

## 2024-11-06 NOTE — TELEPHONE ENCOUNTER
Please have Deyanira review, we received a PA request for Saxenda, it is not on active med list. Please advise if the PA team needs to submit a PA.

## 2024-11-08 ENCOUNTER — OFFICE VISIT (OUTPATIENT)
Dept: NEUROSURGERY | Facility: CLINIC | Age: 54
End: 2024-11-08
Payer: COMMERCIAL

## 2024-11-08 VITALS
HEIGHT: 65 IN | BODY MASS INDEX: 24.16 KG/M2 | WEIGHT: 145 LBS | OXYGEN SATURATION: 99 % | HEART RATE: 69 BPM | DIASTOLIC BLOOD PRESSURE: 74 MMHG | SYSTOLIC BLOOD PRESSURE: 124 MMHG | RESPIRATION RATE: 18 BRPM | TEMPERATURE: 98 F

## 2024-11-08 DIAGNOSIS — D32.9 MENINGIOMA (HCC): Primary | ICD-10-CM

## 2024-11-08 PROCEDURE — 99203 OFFICE O/P NEW LOW 30 MIN: CPT | Performed by: NURSE PRACTITIONER

## 2024-11-08 NOTE — PROGRESS NOTES
Ambulatory Visit  Name: Alina Sharma      : 1970      MRN: [id number]  Encounter Provider: ANIVAL Stock  Encounter Date: 2024   [facility]    Assessment & Plan  Meningioma (HCC)  As addressed in HPI  No complaints   No symptoms associated with meningioma edema or mass effect.  Intentional weight loss form 166 lbs to  145 lbs , she is treating with Wegovy       Imagining   10/17/2024 CTH w/wo Stable 1.4 cm left frontal vertex extra-axial calcified lesion most consistent with a heavily calcified/burned-out meningioma versus possibly nodular area of dural ossification.No acute intracranial abnormality    Plan  Reviewed imagining  Explained will continue with plan f/u in 5 years due 10/17/2029  Advised if she notices any new changes or interval changes she should return sooner for reassessment.   Ordered CTH w/wo ---check out will notify patient in  to schedule for   Advised if she has additional questions or concerns contact the office.            History of Present Illness   Meningioma incidentally found in  .  10/24/2018 MRI Brain w/wo --There is a calcified extra-axial left frontal vertex mass compatible with calcified meningioma measuring approximately 13 x 10 x 6 mm. No abnormal white matter signal identified. Brainstem and cerebellum demonstrate normal signal. No adjacent parenchymal edema or significant mass effect. Postcontrast imaging of the brain demonstrates no abnormal enhancement.     Initial neurosurgery appointment 24 with Dr MARTÍNEZ.As per provider documentation 2018  Although this could be easily removed, I do not believe it is asymptomatic and therefore there would be no change in the patient's overall quality of life by undergoing thid surgical procedure.     Ordered f/u in 1 year w/ MRI Brain w/wo   2019 At the left frontal vertex, mildly indenting the left motor cortex is a well-circumscribed 0.8 x  0.6 x 1.1 cm (measured on image 14 of series 12 and image 99 of series 11) nonenhancing extra-axial lesion along the inner table of the left frontal calvarium.  This is stable in appearance when compared to the prior study.    This calcified lesion which may represent a 'burnt out' meningioma versus exostosis.  CT imaging may be helpful in further distinguishing between the differential considerations.     Last OV 10/31/2019 w/ Dr. MARTÍNEZ imagining and patient were stable. She was planned for RTO in 5 years w/ CTH imagining    She presents today for 5 year f/u visit .        HPI  Review of Systems   Constitutional:  Negative for fatigue.   HENT:  Negative for tinnitus.    Eyes:  Negative for visual disturbance.   Respiratory: Negative.     Cardiovascular: Negative.    Gastrointestinal:  Negative for nausea and vomiting.   Endocrine: Negative.    Genitourinary: Negative.    Musculoskeletal: Negative.    Skin: Negative.    Allergic/Immunologic: Negative.    Neurological:  Negative for dizziness, speech difficulty, weakness and headaches.   Hematological: Negative.    Psychiatric/Behavioral:  Negative for sleep disturbance.      I have personally reviewed the MA's review of systems and made changes as necessary.    Pertinent Medical History         Medical History Reviewed by provider this encounter:       Past Medical History   Past Medical History:   Diagnosis Date    Allergic 1974    Mold mildew dust    Anxiety     Asthma     Last assessed 9/25/2014    Colon polyp     Constipation     Depression 1999    First pregnancy    Eczema     Irritable bowel syndrome (IBS)     Constipation    Kidney stone Unknown    See chart    Seasonal allergies     [place] teeth extracted      Past Surgical History:   Procedure Laterality Date    COLONOSCOPY      HYSTERECTOMY  2015    LAPAROSCOPY FOR ECTOPIC PREGNANCY  1999    ME BLEPHAROPLASTY UPPER EYELID Bilateral 12/21/2023    Procedure: BILATERAL BLEPHAROPLASTY UPPER;  Surgeon: Soraya  "Adina German DO;  Location:  MAIN OR;  Service: Plastics    TONSILLECTOMY AND ADENOIDECTOMY      WISDOM TOOTH EXTRACTION       Family History   Problem Relation Age of Onset    Hypertension Mother     Colon cancer Father 48    Cancer Father          colon    No Known Problems Sister     No Known Problems Daughter     No Known Problems Maternal Grandmother     No Known Problems Maternal Grandfather     No Known Problems Paternal Grandmother     No Known Problems Paternal Grandfather     No Known Problems Maternal Aunt      Current Outpatient Medications on File Prior to Visit   Medication Sig Dispense Refill    ALPRAZolam (XANAX) 0.25 mg tablet Take 1 tablet (0.25 mg total) by mouth 3 (three) times a day as needed for anxiety 30 tablet 0    b complex vitamins capsule Take 1 capsule by mouth daily      B-D ALLERGY SYRINGE 1CC/28G 28G X 1/2\" 1 ML MISC   2    Butalbital-APAP-Caffeine (Fioricet) -40 MG CAPS Take 1 tablet by mouth daily as needed (ha) 20 capsule 0    Cholecalciferol (VITAMIN D3) 2000 units capsule Take 2 capsules by mouth daily        citalopram (CeleXA) 20 mg tablet Take 1 tablet (20 mg total) by mouth daily 90 tablet 3    cyclobenzaprine (FLEXERIL) 10 mg tablet TAKE ONE TABLET BY MOUTH 3 TIMES A DAY AS NEEDED FOR MUSCLE SPASMS (Patient taking differently: Take 10 mg by mouth 3 (three) times a day as needed for muscle spasms) 45 tablet 0    Dulera 100-5 MCG/ACT inhaler Inhale 2 puffs 2 (two) times a day 13 g 3    Dupixent subcutaneous injection Inject 2 mL (300 mg total) under the skin every 14 (fourteen) days 4 mL 12    estradiol (ESTRACE) 1 mg tablet TAKE 1 1/2 TABLETS BY MOUTH DAILY 135 tablet 1    fluticasone (FLONASE) 50 mcg/act nasal spray 2 sprays into each nostril every morning 11 mL 0    INSULIN SYRINGE 1CC/29G 29G X 1/2\" 1 ML MISC       levocetirizine (XYZAL) 5 MG tablet Take 1 tablet (5 mg total) by mouth every evening 90 tablet 1    MAGNESIUM PO Take 1 capsule by mouth " "daily      ondansetron (ZOFRAN) 4 mg tablet Take 1 tablet (4 mg total) by mouth every 8 (eight) hours as needed for nausea or vomiting 20 tablet 0    Progesterone 100 MG CAPS Take 100 mg by mouth at bedtime (Patient taking differently: Take 100 mg by mouth daily at bedtime) 30 capsule 11    Prucalopride Succinate 2 MG TABS Take 2 mg by mouth daily (Patient taking differently: Take 2 mg by mouth every morning) 90 tablet 3    Restasis 0.05 % ophthalmic emulsion Administer 1 drop to both eyes every 12 (twelve) hours      Semaglutide-Weight Management (Wegovy) 1 MG/0.5ML Inject 1 mg under the skin weekly 2 mL 0    tobramycin-dexamethasone (TOBRADEX) ophthalmic suspension Administer 1 drop to both eyes if needed      TURMERIC PO Take 1 capsule by mouth daily      valACYclovir (VALTREX) 500 mg tablet Take 1 tablet (500 mg total) by mouth every morning 90 tablet 3    Ventolin  (90 Base) MCG/ACT inhaler Inhale 2 puffs every 4 (four) hours as needed for wheezing       No current facility-administered medications on file prior to visit.   No Known Allergies   Current Outpatient Medications on File Prior to Visit   Medication Sig Dispense Refill    ALPRAZolam (XANAX) 0.25 mg tablet Take 1 tablet (0.25 mg total) by mouth 3 (three) times a day as needed for anxiety 30 tablet 0    b complex vitamins capsule Take 1 capsule by mouth daily      B-D ALLERGY SYRINGE 1CC/28G 28G X 1/2\" 1 ML MISC   2    Butalbital-APAP-Caffeine (Fioricet) -40 MG CAPS Take 1 tablet by mouth daily as needed (ha) 20 capsule 0    Cholecalciferol (VITAMIN D3) 2000 units capsule Take 2 capsules by mouth daily        citalopram (CeleXA) 20 mg tablet Take 1 tablet (20 mg total) by mouth daily 90 tablet 3    cyclobenzaprine (FLEXERIL) 10 mg tablet TAKE ONE TABLET BY MOUTH 3 TIMES A DAY AS NEEDED FOR MUSCLE SPASMS (Patient taking differently: Take 10 mg by mouth 3 (three) times a day as needed for muscle spasms) 45 tablet 0    Dulera 100-5 MCG/ACT " "inhaler Inhale 2 puffs 2 (two) times a day 13 g 3    Dupixent subcutaneous injection Inject 2 mL (300 mg total) under the skin every 14 (fourteen) days 4 mL 12    estradiol (ESTRACE) 1 mg tablet TAKE 1 1/2 TABLETS BY MOUTH DAILY 135 tablet 1    fluticasone (FLONASE) 50 mcg/act nasal spray 2 sprays into each nostril every morning 11 mL 0    INSULIN SYRINGE 1CC/29G 29G X 1/2\" 1 ML MISC       levocetirizine (XYZAL) 5 MG tablet Take 1 tablet (5 mg total) by mouth every evening 90 tablet 1    MAGNESIUM PO Take 1 capsule by mouth daily      ondansetron (ZOFRAN) 4 mg tablet Take 1 tablet (4 mg total) by mouth every 8 (eight) hours as needed for nausea or vomiting 20 tablet 0    Progesterone 100 MG CAPS Take 100 mg by mouth at bedtime (Patient taking differently: Take 100 mg by mouth daily at bedtime) 30 capsule 11    Prucalopride Succinate 2 MG TABS Take 2 mg by mouth daily (Patient taking differently: Take 2 mg by mouth every morning) 90 tablet 3    Restasis 0.05 % ophthalmic emulsion Administer 1 drop to both eyes every 12 (twelve) hours      Semaglutide-Weight Management (Wegovy) 1 MG/0.5ML Inject 1 mg under the skin weekly 2 mL 0    tobramycin-dexamethasone (TOBRADEX) ophthalmic suspension Administer 1 drop to both eyes if needed      TURMERIC PO Take 1 capsule by mouth daily      valACYclovir (VALTREX) 500 mg tablet Take 1 tablet (500 mg total) by mouth every morning 90 tablet 3    Ventolin  (90 Base) MCG/ACT inhaler Inhale 2 puffs every 4 (four) hours as needed for wheezing       No current facility-administered medications on file prior to visit.      Social History     Tobacco Use    Smoking status: Former     Current packs/day: 0.00     Average packs/day: 0.3 packs/day for 10.6 years (2.7 ttl pk-yrs)     Types: Cigarettes     Start date:      Quit date: 1993     Years since quittin.2     Passive exposure: Never    Smokeless tobacco: Never   Vaping Use    Vaping status: Never Used   Substance " "and Sexual Activity    Alcohol use: Yes     Alcohol/week: 1.0 standard drink of alcohol     Types: 1 Glasses of wine per week     Comment: Social    Drug use: Never    Sexual activity: Yes     Partners: Male     Birth control/protection: Post-menopausal, Male Sterilization     Objective     /74 (BP Location: Left arm, Patient Position: Sitting, Cuff Size: Adult)   Pulse 69   Temp 98 °F (36.7 °C) (Temporal)   Resp 18   Ht 5' 5\" (1.651 m)   Wt 65.8 kg (145 lb)   SpO2 99%   BMI 24.13 kg/m²     Physical Exam  Vitals and nursing note reviewed.   Constitutional:       General: She is not in acute distress.     Appearance: She is not ill-appearing.   Eyes:      General: No scleral icterus.        Right eye: No discharge.         Left eye: No discharge.      Conjunctiva/sclera: Conjunctivae normal.   Pulmonary:      Effort: Pulmonary effort is normal. No respiratory distress.   Musculoskeletal:         General: Normal range of motion.      Cervical back: Normal range of motion.   Skin:     General: Skin is warm and dry.   Neurological:      General: No focal deficit present.      Mental Status: She is alert and oriented to person, place, and time.      Motor: Motor strength is normal.     Gait: Gait is intact.   Psychiatric:         Mood and Affect: Mood normal.         Behavior: Behavior normal.       Neurologic Exam     Mental Status   Oriented to person, place, and time.   Level of consciousness: arousable by tactile stimuli    Motor Exam     Strength   Strength 5/5 throughout.     Sensory Exam   Light touch normal.     Gait, Coordination, and Reflexes     Gait  Gait: normal      I have reviewed radiology reports from PACS/Epic including: CT head.    Administrative Statements   I have spent a total time of 30 minutes in caring for this patient on the day of the visit/encounter including Diagnostic results, Risks and benefits of tx options, Instructions for management, Patient and family education, Importance " of tx compliance, Risk factor reductions, Impressions, Counseling / Coordination of care, Documenting in the medical record, Reviewing / ordering tests, medicine, procedures  , Obtaining or reviewing history  , and Communicating with other healthcare professionals .

## 2024-11-09 NOTE — ASSESSMENT & PLAN NOTE
As addressed in HPI  No complaints   No symptoms associated with meningioma edema or mass effect.  Intentional weight loss form 166 lbs to  145 lbs , she is treating with Wegovy       Imagining   10/17/2024 CTH w/wo Stable 1.4 cm left frontal vertex extra-axial calcified lesion most consistent with a heavily calcified/burned-out meningioma versus possibly nodular area of dural ossification.No acute intracranial abnormality    Plan  Reviewed imagining  Explained will continue with plan f/u in 5 years due 10/17/2029  Advised if she notices any new changes or interval changes she should return sooner for reassessment.   Ordered CTH w/wo ---check out will notify patient in 2028 to schedule for 2029  Advised if she has additional questions or concerns contact the office.

## 2024-11-17 DIAGNOSIS — E66.3 OVERWEIGHT (BMI 25.0-29.9): ICD-10-CM

## 2024-11-19 NOTE — TELEPHONE ENCOUNTER
Patient called the RX Refill Line. Message is being forwarded to the office.     Patient states she does want to stay on the current dose.

## 2024-11-19 NOTE — TELEPHONE ENCOUNTER
Requested medication(s) are due for refill today: Yes  Patient has already received a courtesy refill: No  Other reason request has been forwarded to provider: patient requesting same dose

## 2024-11-20 RX ORDER — SEMAGLUTIDE 1 MG/.5ML
INJECTION, SOLUTION SUBCUTANEOUS
Qty: 2 ML | Refills: 5 | Status: SHIPPED | OUTPATIENT
Start: 2024-11-20

## 2024-11-29 DIAGNOSIS — E66.09 CLASS 1 OBESITY DUE TO EXCESS CALORIES WITH SERIOUS COMORBIDITY AND BODY MASS INDEX (BMI) OF 31.0 TO 31.9 IN ADULT: Primary | ICD-10-CM

## 2024-11-29 DIAGNOSIS — E66.811 CLASS 1 OBESITY DUE TO EXCESS CALORIES WITH SERIOUS COMORBIDITY AND BODY MASS INDEX (BMI) OF 31.0 TO 31.9 IN ADULT: Primary | ICD-10-CM

## 2024-11-29 PROBLEM — E66.3 OVERWEIGHT (BMI 25.0-29.9): Status: RESOLVED | Noted: 2024-03-27 | Resolved: 2024-11-29

## 2024-11-29 RX ORDER — SEMAGLUTIDE 1.7 MG/.75ML
INJECTION, SOLUTION SUBCUTANEOUS
Qty: 3 ML | Refills: 0 | Status: SHIPPED | OUTPATIENT
Start: 2024-11-29

## 2024-12-08 DIAGNOSIS — N95.1 MENOPAUSAL SYMPTOMS: ICD-10-CM

## 2024-12-10 ENCOUNTER — TELEPHONE (OUTPATIENT)
Age: 54
End: 2024-12-10

## 2024-12-10 RX ORDER — PROGESTERONE 100 MG/1
100 CAPSULE ORAL
Qty: 90 CAPSULE | Refills: 3 | Status: SHIPPED | OUTPATIENT
Start: 2024-12-10

## 2024-12-10 RX ORDER — PROGESTERONE 100 MG/1
100 CAPSULE ORAL
Qty: 90 CAPSULE | Refills: 3 | Status: SHIPPED | OUTPATIENT
Start: 2024-12-10 | End: 2024-12-10 | Stop reason: SDUPTHER

## 2024-12-10 NOTE — TELEPHONE ENCOUNTER
Attempted to reach patient to schedule year follow up per [facility], left voicemail per communication consent. Reached out following a medication refill.

## 2024-12-24 DIAGNOSIS — E66.811 CLASS 1 OBESITY DUE TO EXCESS CALORIES WITH SERIOUS COMORBIDITY AND BODY MASS INDEX (BMI) OF 31.0 TO 31.9 IN ADULT: ICD-10-CM

## 2024-12-24 DIAGNOSIS — E66.09 CLASS 1 OBESITY DUE TO EXCESS CALORIES WITH SERIOUS COMORBIDITY AND BODY MASS INDEX (BMI) OF 31.0 TO 31.9 IN ADULT: ICD-10-CM

## 2024-12-26 ENCOUNTER — OFFICE VISIT (OUTPATIENT)
Dept: GASTROENTEROLOGY | Facility: CLINIC | Age: 54
End: 2024-12-26
Payer: COMMERCIAL

## 2024-12-26 VITALS
TEMPERATURE: 97.6 F | BODY MASS INDEX: 23.82 KG/M2 | SYSTOLIC BLOOD PRESSURE: 118 MMHG | HEIGHT: 65 IN | DIASTOLIC BLOOD PRESSURE: 82 MMHG | WEIGHT: 143 LBS

## 2024-12-26 DIAGNOSIS — R19.8 ABNORMAL DEFECATION: Primary | ICD-10-CM

## 2024-12-26 DIAGNOSIS — Z80.0 FAMILY HISTORY OF COLON CANCER: ICD-10-CM

## 2024-12-26 DIAGNOSIS — K59.09 CHRONIC CONSTIPATION: ICD-10-CM

## 2024-12-26 DIAGNOSIS — K92.1 HEMATOCHEZIA: ICD-10-CM

## 2024-12-26 DIAGNOSIS — N81.6 RECTOCELE: ICD-10-CM

## 2024-12-26 DIAGNOSIS — R11.0 CHRONIC NAUSEA: ICD-10-CM

## 2024-12-26 DIAGNOSIS — R14.0 BLOATING: ICD-10-CM

## 2024-12-26 PROCEDURE — 99214 OFFICE O/P EST MOD 30 MIN: CPT | Performed by: INTERNAL MEDICINE

## 2024-12-26 RX ORDER — SEMAGLUTIDE 1.7 MG/.75ML
INJECTION, SOLUTION SUBCUTANEOUS
Qty: 3 ML | Refills: 5 | Status: SHIPPED | OUTPATIENT
Start: 2024-12-26

## 2024-12-26 RX ORDER — POLYETHYLENE GLYCOL 3350 17 G/17G
17 POWDER, FOR SOLUTION ORAL DAILY
Qty: 510 G | Refills: 5 | Status: SHIPPED | OUTPATIENT
Start: 2024-12-26 | End: 2025-06-24

## 2024-12-26 RX ORDER — DOCUSATE SODIUM 100 MG/1
100 CAPSULE, LIQUID FILLED ORAL 2 TIMES DAILY
Qty: 60 CAPSULE | Refills: 5 | Status: SHIPPED | OUTPATIENT
Start: 2024-12-26 | End: 2025-06-24

## 2024-12-26 NOTE — ASSESSMENT & PLAN NOTE
As above.     RTC 6 months.      Matheus Car MD  Gastroenterology  [facility]  Date: December 26, 2024

## 2024-12-26 NOTE — PROGRESS NOTES
Name: Alina Sharma      : 1970      MRN: [id number]  Encounter Provider: Matheus Car MD  Encounter Date: 2024   Encounter department: [facility] [place]  :  Assessment & Plan  Abnormal defecation  Patient has chronic problems with hard to pass stool, anorectal dysfunction.  She also has rectocele.  She has tried multiple laxatives which have been ineffective.  I discussed with patient that because she has outlet obstruction/anorectal dysfunction, laxative/stool softeners are not going to completely take care of her medical problem.  Her rectocele is also contributing to difficulty in passage of stools.  I discussed with her about referral to colorectal surgery and OB/GYN to discuss management of rectocele.  In the meanwhile it would be beneficial for her to apply pressure manually on the rectocele from the vaginal canal to assist with bowel movements.  She has had problems with taking Metamucil and get some more bloated.  I will put her on MiraLAX twice a day and Colace twice a day to at least get her some softer bowel movements.  I also discussed with her about using squatty potty or stepping stool while sitting on the commode to assume a squatting position which can help with passage of stools.  Will also refer her to pelvic physical therapy to assist with management of anorectal dysfunction.  Regarding nausea, it has been happening since started Wegovy so likely a side effect.  Regarding bloating, this is secondary to uncontrolled constipation.  She could benefit from SIBO testing which can be discussed at next office visit.  SIBO test was ordered at last office visit but not done yet.  She has been having blood in stools intermittently.  Given history of constipation, last colonoscopy last year which was unrevealing for any rectal tumor or mass or IBD, this is likely secondary to hemorrhoids.  I offered patient steroid suppository which she declined  currently.  Regarding her family history of colon cancer, her last colonoscopy was in 2023.  I reiterated to patient that she needs colonoscopy every 5 years at least so the next and would be due in 2028.  Patient understood.  Orders:    Ambulatory Referral to Obstetrics / Gynecology; Future    Ambulatory Referral to Colorectal Surgery; Future    Ambulatory Referral to Physical Therapy; Future    polyethylene glycol (MIRALAX) 17 g packet; Take 17 g by mouth daily    docusate sodium (COLACE) 100 mg capsule; Take 1 capsule (100 mg total) by mouth 2 (two) times a day    Rectocele  As above.  Orders:    Ambulatory Referral to Obstetrics / Gynecology; Future    Ambulatory Referral to Colorectal Surgery; Future    Ambulatory Referral to Physical Therapy; Future    polyethylene glycol (MIRALAX) 17 g packet; Take 17 g by mouth daily    docusate sodium (COLACE) 100 mg capsule; Take 1 capsule (100 mg total) by mouth 2 (two) times a day    Chronic constipation  As above.  Orders:    Ambulatory Referral to Obstetrics / Gynecology; Future    Ambulatory Referral to Colorectal Surgery; Future    Ambulatory Referral to Physical Therapy; Future    polyethylene glycol (MIRALAX) 17 g packet; Take 17 g by mouth daily    docusate sodium (COLACE) 100 mg capsule; Take 1 capsule (100 mg total) by mouth 2 (two) times a day    Bloating  As above.  Orders:    Ambulatory Referral to Obstetrics / Gynecology; Future    Ambulatory Referral to Colorectal Surgery; Future    Ambulatory Referral to Physical Therapy; Future    polyethylene glycol (MIRALAX) 17 g packet; Take 17 g by mouth daily    docusate sodium (COLACE) 100 mg capsule; Take 1 capsule (100 mg total) by mouth 2 (two) times a day    Chronic nausea  As above.  Orders:    Ambulatory Referral to Obstetrics / Gynecology; Future    Ambulatory Referral to Colorectal Surgery; Future    Ambulatory Referral to Physical Therapy; Future    polyethylene glycol (MIRALAX) 17 g packet; Take 17 g by  mouth daily    docusate sodium (COLACE) 100 mg capsule; Take 1 capsule (100 mg total) by mouth 2 (two) times a day    Hematochezia  As above.           Family history of colon cancer  As above.     RTC 6 months.      Matheus Car MD  Gastroenterology  [facility]  Date: December 26, 2024      History of Present Illness   Constipation  Pertinent negatives include no abdominal pain, back pain, fever or vomiting.     Alina Sharma is a 54 y.o. female with history of chronic constipation, family history of colon cancer, history of colon polyps presents for follow-up.    Patient has had constipation issues for a while.  She had sits marker study in the past which showed pellets in the left hemicolon.  Patient had defecography done in 2015 which showed small rectocele.  She has been on multiple medications for treatment of constipation including Amitiza, Linzess, Trulance and then Motegrity.  Motegrity was prescribed last office visit.    Patient reports she continues to have bloating and hard to pass stools.  She also has nausea which has been occurring for the last few months since she has been on Wegovy.  She also feels her constipation may have worsened after she started Wegovy.  She stopped Motegrity since she thought it was no longer effective.  Currently taking laxatives intermittently on a as needed basis which are not helping much.  She told me that she has never been told that she had rectocele in the past.  She has had occasional blood in stool as well from straining.      Review of Systems   Constitutional:  Negative for chills and fever.   HENT:  Negative for ear pain and sore throat.    Eyes:  Negative for pain and visual disturbance.   Respiratory:  Negative for cough and shortness of breath.    Cardiovascular:  Negative for chest pain and palpitations.   Gastrointestinal:  Positive for constipation. Negative for abdominal pain and vomiting.   Genitourinary:  Negative for dysuria  "and hematuria.   Musculoskeletal:  Negative for arthralgias and back pain.   Skin:  Negative for color change and rash.   Neurological:  Negative for seizures and syncope.   All other systems reviewed and are negative.         Objective   /82 (BP Location: Left arm, Patient Position: Sitting, Cuff Size: Adult)   Temp 97.6 °F (36.4 °C) (Tympanic)   Ht 5' 5\" (1.651 m)   Wt 64.9 kg (143 lb)   BMI 23.80 kg/m²      Physical Exam  Vitals and nursing note reviewed.   Constitutional:       General: She is not in acute distress.     Appearance: She is well-developed.   HENT:      Head: Normocephalic and atraumatic.   Eyes:      Conjunctiva/sclera: Conjunctivae normal.   Cardiovascular:      Rate and Rhythm: Normal rate and regular rhythm.      Heart sounds: No murmur heard.  Pulmonary:      Effort: Pulmonary effort is normal. No respiratory distress.      Breath sounds: Normal breath sounds.   Abdominal:      Palpations: Abdomen is soft.      Tenderness: There is no abdominal tenderness.   Musculoskeletal:         General: No swelling.      Cervical back: Neck supple.   Skin:     General: Skin is warm and dry.      Capillary Refill: Capillary refill takes less than 2 seconds.   Neurological:      Mental Status: She is alert.   Psychiatric:         Mood and Affect: Mood normal.           "

## 2024-12-30 ENCOUNTER — EVALUATION (OUTPATIENT)
Dept: PHYSICAL THERAPY | Facility: REHABILITATION | Age: 54
End: 2024-12-30
Payer: COMMERCIAL

## 2024-12-30 DIAGNOSIS — R19.8 ABNORMAL DEFECATION: ICD-10-CM

## 2024-12-30 DIAGNOSIS — N81.6 RECTOCELE: ICD-10-CM

## 2024-12-30 DIAGNOSIS — K59.09 CHRONIC CONSTIPATION: Primary | ICD-10-CM

## 2024-12-30 PROCEDURE — 97162 PT EVAL MOD COMPLEX 30 MIN: CPT | Performed by: PHYSICAL THERAPIST

## 2024-12-30 PROCEDURE — 97530 THERAPEUTIC ACTIVITIES: CPT | Performed by: PHYSICAL THERAPIST
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
## 2025-01-01 DIAGNOSIS — R11.0 NAUSEA: ICD-10-CM

## 2025-01-03 RX ORDER — ONDANSETRON 4 MG/1
4 TABLET, FILM COATED ORAL EVERY 8 HOURS PRN
Qty: 20 TABLET | Refills: 0 | Status: SHIPPED | OUTPATIENT
Start: 2025-01-03

## 2025-01-10 ENCOUNTER — OFFICE VISIT (OUTPATIENT)
Dept: PHYSICAL THERAPY | Facility: REHABILITATION | Age: 55
End: 2025-01-10
Payer: COMMERCIAL

## 2025-01-10 DIAGNOSIS — N81.6 RECTOCELE: ICD-10-CM

## 2025-01-10 DIAGNOSIS — R19.8 ABNORMAL DEFECATION: Primary | ICD-10-CM

## 2025-01-10 PROCEDURE — 97140 MANUAL THERAPY 1/> REGIONS: CPT

## 2025-01-10 PROCEDURE — 97112 NEUROMUSCULAR REEDUCATION: CPT

## 2025-01-10 NOTE — PROGRESS NOTES
Daily Note     Today's date: 1/10/2025  Patient name: Alina Sharma  : 1970  MRN: [id number]  Referring provider: Matheus Car MD  Dx:   Encounter Diagnosis     ICD-10-CM    1. Abnormal defecation  R19.8       2. Rectocele  N81.6                      Subjective: Pt did purchase a squatty potty, increased her water intake to possibly 40 ounces.  Increased her weights for strength training and added more Colace as suggested by Marlin.  Was able to have a very large bowel movement which caused bleeding, then she has 2-3 days of looser bowels.      Objective: See treatment diary below      Assessment: Tolerated treatment well. Patient would benefit from continued PT. Focussed today's session on manual techniques over abdominal region (bowel massage and fascia decompression over more sore restricted areas).  Also reviewed how to perform intravaginal splinting.    Next session, primary PT to perform rectal exam in addition to a repeat of vaginal exam in standing position to better assess prolapse.    Plan: Continue per plan of care.     Goals  1. Patient will report no pain or bleeding with BM for > 1 month upon discharge.  2. Patient will report defecation at least 1x/week without suppository/enema assistance within 8 weeks.  3. Patient will demonstrates appropriate defecation mechanics and PFM coordination in 6 weeks.  4. Patient will report fluid intake to 60 oz total (with >60% water content) in 4 weeks.        Precautions: standard    POC expires  Auth Status? (BOMN, approved, pending) Unit limit (Daily) Auth Start date Expiration date PT/OT + Visit Limit?   25          Date of Service 12/30 01/10       Visits Used 1 2       Visits Remaining         Patient Education         Medbridge acct created?         PFM anatomy and function 5'        Bladder healthy habits                  Fluid intake Recommended to increase to 48 oz/day 40-48                         Neuro Re-Ed                  Defecation  mechanics  Reviewed w/ bear down       Pelvic drop  Practiced w/ tactile cueing       PF elevator  Practiced w/ tactile cueing       DB                  Ther Ex         Lumbar strengthening         Hip strengthening                           Ther Activity                                    Manual Ther         PFM exam completed ** repeat vaginal and do rectal exam       Ortho exam         PFM STM/MFR  L MO       Abdominal MFR  20'                         Modalities

## 2025-01-17 ENCOUNTER — OFFICE VISIT (OUTPATIENT)
Dept: PHYSICAL THERAPY | Facility: REHABILITATION | Age: 55
End: 2025-01-17
Payer: COMMERCIAL

## 2025-01-17 DIAGNOSIS — R19.8 ABNORMAL DEFECATION: Primary | ICD-10-CM

## 2025-01-17 DIAGNOSIS — K59.09 CHRONIC CONSTIPATION: ICD-10-CM

## 2025-01-17 DIAGNOSIS — N81.6 RECTOCELE: ICD-10-CM

## 2025-01-17 PROCEDURE — 97530 THERAPEUTIC ACTIVITIES: CPT | Performed by: PHYSICAL THERAPIST

## 2025-01-17 PROCEDURE — 97140 MANUAL THERAPY 1/> REGIONS: CPT | Performed by: PHYSICAL THERAPIST

## 2025-01-17 NOTE — PROGRESS NOTES
Daily Note     Today's date: 2025  Patient name: Alina Sharma  : 1970  MRN: [id number]  Referring provider: Matheus Car MD  Dx:   Encounter Diagnosis     ICD-10-CM    1. Abnormal defecation  R19.8       2. Rectocele  N81.6       3. Chronic constipation  K59.09           Subjective: 4 days of small bowel movements this week. Overall, Bms are softer, no longer pellets. No anorectal pain with defecation this week. Water intake approx 44 oz, daily colace and use of squatty potty consistently. Felt notable nausea after last session, attributes it to the cupping of lower abdomen, but it resolved within 12 hours.    Objective: See treatment diary below    Assessment: Rectal PFM exam WNL with minimal rectocele and consistent lengthening with bear down. Standing visual and palpation intravaginal with contract and bear down is not largely different from supine exam. Therefore, there are minimal mechanical contributors to defecation at this time. Reinforced nonlinear and slow progression of constipation mgmt.  Reinforced fluid intake and colace use and now adding abdominal massage 1x/day. Jamari explore spinal contributors to constipation in 1-2 visits if needed.     Plan: Continue per plan of care.      Precautions: standard    POC expires  Auth Status? (BOMN, approved, pending) Unit limit (Daily) Auth Start date Expiration date PT/OT + Visit Limit?   25          Date of Service 12/30 01/10 1/17      Visits Used 1 2 3      Visits Remaining         Patient Education         Medbridge acct created?         PFM anatomy and function 5'        Bladder healthy habits                  Fluid intake Recommended to increase to 48 oz/day 40-48oz                         Neuro Re-Ed                  Defecation mechanics  Reviewed w/ bear down       Pelvic drop  Practiced w/ tactile cueing       PF elevator  Practiced w/ tactile cueing       DB                  Ther Ex         Lumbar strengthening         Hip  strengthening                           Ther Activity                                    Manual Ther         PFM exam completed ** repeat vaginal and do rectal exam       Ortho exam         PFM STM/MFR  L TX       Abdominal MFR  20'                         Modalities

## 2025-01-24 ENCOUNTER — APPOINTMENT (OUTPATIENT)
Dept: PHYSICAL THERAPY | Facility: REHABILITATION | Age: 55
End: 2025-01-24
Payer: COMMERCIAL

## 2025-01-27 DIAGNOSIS — J30.1 ALLERGIC RHINITIS DUE TO POLLEN, UNSPECIFIED SEASONALITY: ICD-10-CM

## 2025-01-27 RX ORDER — FLUTICASONE PROPIONATE 50 MCG
2 SPRAY, SUSPENSION (ML) NASAL EVERY MORNING
Qty: 11 ML | Refills: 1 | Status: SHIPPED | OUTPATIENT
Start: 2025-01-27

## 2025-01-30 NOTE — PROGRESS NOTES
Daily Note     Today's date: 2025  Patient name: Alina Sharma  : 1970  MRN: [id number]  Referring provider: Matheus Car MD  Dx:   Encounter Diagnosis     ICD-10-CM    1. Abnormal defecation  R19.8       2. Rectocele  N81.6             Subjective: Did have one satisfactory bowel movement, but overall has improved.  Does the bowel massage every night.  More bloating and soreness which may also be attributed to a core workout 2 days ago.    Objective: See treatment diary below    Assessment: Used tecar therapy over abdominal area which was well tolerated.  During bowel massage, more discomfort signmoid colon  and ascending colon areas.  Ended session with SEMG to improve PFM awareness.  Overall did very well but more challenged in sitting position.  Able to achieve a 4.5 uV in sitting position.     Plan: Continue per plan of care.      Precautions: standard    POC expires  Auth Status? (BOMN, approved, pending) Unit limit (Daily) Auth Start date Expiration date PT/OT + Visit Limit?   25          Date of Service 12/30 01/10 1/17 1/31     Visits Used 1 2 3 4     Visits Remaining         Patient Education         Medbridge acct created?         PFM anatomy and function 5'        Bladder healthy habits                  Fluid intake Recommended to increase to 48 oz/day 40-48oz                         Neuro Re-Ed                  Defecation mechanics  Reviewed w/ bear down       Pelvic drop  Practiced w/ tactile cueing       PF elevator  Practiced w/ tactile cueing       DB         SEMG    15'     Ther Ex         Lumbar strengthening         Hip strengthening                           Ther Activity                                    Manual Ther         PFM exam completed ** repeat vaginal and do rectal exam       Ortho exam         PFM STM/MFR  L CO       Abdominal MFR  20'       winback    15'     Bowel massage    25'     Modalities

## 2025-01-31 ENCOUNTER — OFFICE VISIT (OUTPATIENT)
Dept: PHYSICAL THERAPY | Facility: REHABILITATION | Age: 55
End: 2025-01-31
Payer: COMMERCIAL

## 2025-01-31 DIAGNOSIS — R19.8 ABNORMAL DEFECATION: Primary | ICD-10-CM

## 2025-01-31 DIAGNOSIS — N81.6 RECTOCELE: ICD-10-CM

## 2025-01-31 PROCEDURE — 97112 NEUROMUSCULAR REEDUCATION: CPT

## 2025-01-31 PROCEDURE — 97140 MANUAL THERAPY 1/> REGIONS: CPT

## 2025-02-01 DIAGNOSIS — N95.1 MENOPAUSAL SYMPTOMS: ICD-10-CM

## 2025-02-01 DIAGNOSIS — J30.1 ALLERGIC RHINITIS DUE TO POLLEN, UNSPECIFIED SEASONALITY: ICD-10-CM

## 2025-02-03 ENCOUNTER — HOSPITAL ENCOUNTER (OUTPATIENT)
Dept: RADIOLOGY | Age: 55
Discharge: HOME | End: 2025-02-03
Payer: COMMERCIAL

## 2025-02-03 VITALS — HEIGHT: 65 IN | WEIGHT: 140 LBS | BODY MASS INDEX: 23.32 KG/M2

## 2025-02-03 DIAGNOSIS — Z12.31 ENCOUNTER FOR SCREENING MAMMOGRAM FOR MALIGNANT NEOPLASM OF BREAST: ICD-10-CM

## 2025-02-03 PROCEDURE — 77067 SCR MAMMO BI INCL CAD: CPT

## 2025-02-03 PROCEDURE — 77063 BREAST TOMOSYNTHESIS BI: CPT

## 2025-02-03 RX ORDER — ESTRADIOL 1 MG/1
TABLET ORAL
Qty: 135 TABLET | Refills: 3 | Status: SHIPPED | OUTPATIENT
Start: 2025-02-03

## 2025-02-03 RX ORDER — LEVOCETIRIZINE DIHYDROCHLORIDE 5 MG/1
5 TABLET, FILM COATED ORAL EVERY EVENING
Qty: 90 TABLET | Refills: 0 | Status: SHIPPED | OUTPATIENT
Start: 2025-02-03

## 2025-02-13 ENCOUNTER — OFFICE VISIT (OUTPATIENT)
Dept: PHYSICAL THERAPY | Facility: REHABILITATION | Age: 55
End: 2025-02-13
Payer: COMMERCIAL

## 2025-02-13 ENCOUNTER — TELEPHONE (OUTPATIENT)
Age: 55
End: 2025-02-13

## 2025-02-13 DIAGNOSIS — R19.8 ABNORMAL DEFECATION: Primary | ICD-10-CM

## 2025-02-13 DIAGNOSIS — N81.6 RECTOCELE: ICD-10-CM

## 2025-02-13 PROCEDURE — 97140 MANUAL THERAPY 1/> REGIONS: CPT

## 2025-02-13 PROCEDURE — 97530 THERAPEUTIC ACTIVITIES: CPT

## 2025-02-13 NOTE — TELEPHONE ENCOUNTER
SPOKE WITH PT, PT REFERRED BACK TO GI FROM [facility]EL[facility] FLOOR PT. PT PREFERS TO SEE PROVIDER AT [facility] OFFICE. OFFICE VISIT SCHEDULED.

## 2025-02-13 NOTE — TELEPHONE ENCOUNTER
Patients GI provider:  Dr. Car    Number to return call: [phone]    Reason for call: Pt calling to move up her appt due to finishing her pt and not having any relief. 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 I was unable to find an appt that met her criteria. Pt is hoping something could be done to get her in prior to her June appt. Pt is willing to see a PA or anyone in the pratice she said. Please reach back out if a sooner appt with the pts critera could be made    Scheduled procedure/appointment date if applicable: Apt/6/27/25

## 2025-02-13 NOTE — TELEPHONE ENCOUNTER
Called patient, no answer, left message to call back to have symptoms triaged by nursing staff in regards to request for earlier visit.

## 2025-02-13 NOTE — PROGRESS NOTES
"Daily Note     Today's date: 2025  Patient name: Alina Sharma  : 1970  MRN: [id number]  Referring provider: Matheus Car MD  Dx:   Encounter Diagnosis     ICD-10-CM    1. Abnormal defecation  R19.8       2. Rectocele  N81.6             Subjective: 9 days of constipation, abdominal pressure.  Never feels like it \"just there\"     Objective: See treatment diary below    Lumbar Assessment:   Lumbar ROM: WFL FF, extension, B/L SG  Seated LE Strength Assessment: Grossly 5/5 MMT, noting B/L knee flexion at 4/5  Joint Play: Hypomobility of T4-T9, Lumbar WFL without reproduction of any symptoms  Hip strengthening MMT: Prone hip extension MMT left: 4/5, Right 4/5. Glute grossly 4/5.   Noted - overall no symptom exacerbation during lumbar assessment, no significant lumbar joint restriction or weakness screened.   Requested reproduction of ergonomics at work: Noted increased lumbar flexion and hip rotation with feet on chair. Advised on the effect of posture on PFM length tension and gut motility.       Assessment: Suggested we do PFM cueing, but at this time unsure if she would benefit from it and also pt was limited with time. Since primary PT was busy with another patient, I asked Mary Jo Aris to do a quick spinal assessment. Marlin will complete a neuro assessment on her next visit. Discussed position in which she sits all day while at work, encouraged to sit more upright with a lumbar roll.  We also discussed re-introducing Miralax + 1 stool softener vs 2 stool softener.       Plan: Continue per plan of care.      Precautions: standard    POC expires  Auth Status? (BOMN, approved, pending) Unit limit (Daily) Auth Start date Expiration date PT/OT + Visit Limit?   25          Date of Service 12/30 01/10 1/17 1/31 2/13    Visits Used 1 2 3 4 5    Visits Remaining         Patient Education         Medbridge acct created?         PFM anatomy and function 5'        Bladder healthy habits      L       "      Fluid intake Recommended to increase to 48 oz/day 40-48oz                         Neuro Re-Ed                  Defecation mechanics  Reviewed w/ bear down       Pelvic drop  Practiced w/ tactile cueing       PF elevator  Practiced w/ tactile cueing       DB         SEMG    15'     Ther Ex         Lumbar strengthening         Hip strengthening                           Ther Activity         Review of symptoms     Last 2 weeks 15'                      Manual Ther         Spine/strength assessment     MORGAN 20'    PFM exam completed ** repeat vaginal and do rectal exam       Ortho exam         PFM STM/MFR  L MN       Abdominal MFR  20'       winback    15'     Bowel massage    25'     Modalities

## 2025-03-08 DIAGNOSIS — M54.42 ACUTE BILATERAL LOW BACK PAIN WITH BILATERAL SCIATICA: ICD-10-CM

## 2025-03-08 DIAGNOSIS — M54.41 ACUTE BILATERAL LOW BACK PAIN WITH BILATERAL SCIATICA: ICD-10-CM

## 2025-03-08 DIAGNOSIS — A60.09 HERPES GENITALIS IN WOMEN: ICD-10-CM

## 2025-03-08 RX ORDER — VALACYCLOVIR HYDROCHLORIDE 500 MG/1
500 TABLET, FILM COATED ORAL EVERY MORNING
Qty: 90 TABLET | Refills: 0 | Status: SHIPPED | OUTPATIENT
Start: 2025-03-08 | End: 2026-03-03

## 2025-03-10 RX ORDER — CYCLOBENZAPRINE HCL 10 MG
10 TABLET ORAL 3 TIMES DAILY PRN
Qty: 45 TABLET | Refills: 0 | Status: SHIPPED | OUTPATIENT
Start: 2025-03-10

## 2025-03-12 ENCOUNTER — TELEPHONE (OUTPATIENT)
Age: 55
End: 2025-03-12

## 2025-03-12 NOTE — TELEPHONE ENCOUNTER
Rec'd call from patient stating that she was told she would not be charged co-pay for today's cancelled appointment BUT she states that it's been taken out of her account.    BEFORE end of day, can we try to reverse this for the patient?    Questions/concerns - please contact patient directly.    Thank you.

## 2025-03-12 NOTE — TELEPHONE ENCOUNTER
Patient called stating that she wasn't advised that today's apt will be in [place] and after she did the echeck in and paid the copay she realized it. so she cancelled and requested the copay to be refunded please.   Thank you

## 2025-03-12 NOTE — TELEPHONE ENCOUNTER
LVM informing pt that the payment has not processed on our end yet so we are not able to see it. I advised that she can call her bank to stop pay or wait until it comes through on our end. Because she paid on line it is not showing up anywhere in her chart.

## 2025-03-12 NOTE — TELEPHONE ENCOUNTER
LVM for pt in regards to question about copay. Informed pt that the charge does not go through until we check them in at the  so they will not be charged the copay if cancel the appointment before they fully check in with the  as we charge the card from our end. Advised pt to callback with any other questions.

## 2025-03-18 DIAGNOSIS — J45.20 MILD INTERMITTENT ASTHMA WITHOUT COMPLICATION: ICD-10-CM

## 2025-03-18 DIAGNOSIS — J30.1 ALLERGIC RHINITIS DUE TO POLLEN, UNSPECIFIED SEASONALITY: ICD-10-CM

## 2025-03-19 RX ORDER — FLUTICASONE PROPIONATE 50 MCG
2 SPRAY, SUSPENSION (ML) NASAL EVERY MORNING
Qty: 16 ML | Refills: 0 | Status: SHIPPED | OUTPATIENT
Start: 2025-03-19

## 2025-03-19 RX ORDER — MOMETASONE FUROATE AND FORMOTEROL FUMARATE DIHYDRATE 100; 5 UG/1; UG/1
2 AEROSOL RESPIRATORY (INHALATION) 2 TIMES DAILY
Qty: 13 G | Refills: 5 | Status: SHIPPED | OUTPATIENT
Start: 2025-03-19

## 2025-03-20 ENCOUNTER — OFFICE VISIT (OUTPATIENT)
Dept: GASTROENTEROLOGY | Facility: CLINIC | Age: 55
End: 2025-03-20
Payer: COMMERCIAL

## 2025-03-20 VITALS
HEIGHT: 65 IN | SYSTOLIC BLOOD PRESSURE: 118 MMHG | TEMPERATURE: 98.6 F | DIASTOLIC BLOOD PRESSURE: 77 MMHG | BODY MASS INDEX: 23.82 KG/M2 | WEIGHT: 143 LBS

## 2025-03-20 DIAGNOSIS — N81.6 RECTOCELE: ICD-10-CM

## 2025-03-20 DIAGNOSIS — R14.0 BLOATING: ICD-10-CM

## 2025-03-20 DIAGNOSIS — K58.1 IRRITABLE BOWEL SYNDROME WITH CONSTIPATION: Primary | ICD-10-CM

## 2025-03-20 PROCEDURE — 99213 OFFICE O/P EST LOW 20 MIN: CPT

## 2025-03-20 RX ORDER — TENAPANOR HYDROCHLORIDE 53.2 MG/1
50 TABLET ORAL 2 TIMES DAILY
Qty: 60 TABLET | Refills: 3 | Status: SHIPPED | OUTPATIENT
Start: 2025-03-20

## 2025-03-20 NOTE — PROGRESS NOTES
Name: Alina Sharma      : 1970      MRN: [id number]  Encounter Provider: Christina Machuca PA-C  Encounter Date: 3/20/2025   Encounter department: [facility] [place]  :  Assessment & Plan  Irritable bowel syndrome with constipation  Patient with long history of constipation.  Has tried Linzess, Trulance, Amitiza, Motegrity in the past.  Recently did pelvic floor PT with no improvement in symptoms.  Per patient was told that rectocele is minimal and likely not contributing to symptoms.  Will trial on Ibsrela 50 mg twice daily.  Continue with MiraLAX and stool softener.  If no improvement with Ibsrela or not covered with insurance we could try Amitiza again as this worked the best for her in the past.  Orders:    Tenapanor HCl (Ibsrela) 50 MG TABS; Take 50 mg by mouth 2 (two) times a day    Bloating  Likely secondary to constipation.  Plan as above.       Rectocele  As above.       Follow up 3 months    History of Present Illness   HPI  Alina Sharma is a 54 y.o. female with PMH of migraines, anxiety, depression, chronic constipation who presents for follow-up.  Patient was last seen by Dr. Car 2024 for constipation, bloating, hard to pass stools.  She was previously on Motegrity but stopped this as she thought it was no longer effective at that time she was taking laxatives intermittently as needed. Tried linzess, trulance, amitiza in the past. She also has a rectocele and was referred to colorectal surgery OB/GYN at last visit to discuss this.  She was also referred for pelvic floor PT.  She has completed this and still has ongoing symptoms.  She states she was told her rectocele is a very small and likely not contributing to symptoms per pelvic floor physical therapist.    She has ongoing constipation, straining.  She will have a small bowel movement every few days.  Associated bloating and gas.  She is using squatty potty to help without improvement.  Drinks  "plenty of water and diet high in fiber.  She is currently taking MiraLAX daily and stool softener in the evening.  She states Amitiza did work the best in the past.  Denies unintentional weight loss, nausea, vomiting, melena, hematochezia.    Prior imaging/procedures:  Colonoscopy 9/2023: Subcentimeter polyp in ascending colon (HP)-5-year recall  CT A/P without contrast 12/2022: Large colonic stool burden, redemonstrated left greater than right nonobstructing renal calculi  Sits marker study suggestive of functional outlet obstruction  Defecography 2015: Small anterior rectocele    Review of Systems   Constitutional:  Negative for appetite change, chills and fever.   Respiratory:  Negative for shortness of breath.    Gastrointestinal:  Positive for constipation. Negative for abdominal pain, blood in stool, diarrhea, nausea and vomiting.     Medical History Reviewed by provider this encounter:  Tobacco  Allergies  Meds  Problems  Med Hx  Surg Hx  Fam Hx     .  Current Outpatient Medications on File Prior to Visit   Medication Sig Dispense Refill    ALPRAZolam (XANAX) 0.25 mg tablet Take 1 tablet (0.25 mg total) by mouth 3 (three) times a day as needed for anxiety 30 tablet 0    b complex vitamins capsule Take 1 capsule by mouth daily      B-D ALLERGY SYRINGE 1CC/28G 28G X 1/2\" 1 ML MISC   2    Butalbital-APAP-Caffeine (Fioricet) -40 MG CAPS Take 1 tablet by mouth daily as needed (ha) 20 capsule 0    Cholecalciferol (VITAMIN D3) 2000 units capsule Take 2 capsules by mouth daily        citalopram (CeleXA) 20 mg tablet Take 1 tablet (20 mg total) by mouth daily 90 tablet 3    cyclobenzaprine (FLEXERIL) 10 mg tablet Take 1 tablet (10 mg total) by mouth 3 (three) times a day as needed for muscle spasms 45 tablet 0    docusate sodium (COLACE) 100 mg capsule Take 1 capsule (100 mg total) by mouth 2 (two) times a day 60 capsule 5    Dulera 100-5 MCG/ACT inhaler Inhale 2 puffs 2 (two) times a day 13 g 5    " "Dupixent subcutaneous injection Inject 2 mL (300 mg total) under the skin every 14 (fourteen) days 4 mL 12    estradiol (ESTRACE) 1 mg tablet TAKE 1 1/2 TABLETS BY MOUTH DAILY 135 tablet 3    fluticasone (FLONASE) 50 mcg/act nasal spray 2 sprays into each nostril every morning 16 mL 0    INSULIN SYRINGE 1CC/29G 29G X 1/2\" 1 ML MISC       levocetirizine (XYZAL) 5 MG tablet Take 1 tablet (5 mg total) by mouth every evening 90 tablet 0    MAGNESIUM PO Take 1 capsule by mouth daily      ondansetron (ZOFRAN) 4 mg tablet Take 1 tablet (4 mg total) by mouth every 8 (eight) hours as needed for nausea or vomiting 20 tablet 0    polyethylene glycol (MIRALAX) 17 g packet Take 17 g by mouth daily 510 g 5    Progesterone 100 MG CAPS Take 100 mg by mouth at bedtime 90 capsule 3    Prucalopride Succinate 2 MG TABS Take 2 mg by mouth daily 90 tablet 3    Restasis 0.05 % ophthalmic emulsion Administer 1 drop to both eyes every 12 (twelve) hours      Semaglutide-Weight Management (Wegovy) 1.7 MG/0.75ML Inject 1.7 mg under the skin weekly 3 mL 5    tobramycin-dexamethasone (TOBRADEX) ophthalmic suspension Administer 1 drop to both eyes if needed      TURMERIC PO Take 1 capsule by mouth daily      valACYclovir (VALTREX) 500 mg tablet Take 1 tablet (500 mg total) by mouth every morning 90 tablet 0    Ventolin  (90 Base) MCG/ACT inhaler Inhale 2 puffs every 4 (four) hours as needed for wheezing      Semaglutide-Weight Management (Wegovy) 1 MG/0.5ML Inject 1 mg under the skin weekly 2 mL 5    Wegovy 1 MG/0.5ML Inject 1 mg under the skin weekly. (Patient not taking: Reported on 3/20/2025) 2 mL 5     No current facility-administered medications on file prior to visit.      Social History     Tobacco Use    Smoking status: Former     Current packs/day: 0.00     Average packs/day: 0.3 packs/day for 10.6 years (2.7 ttl pk-yrs)     Types: Cigarettes     Start date:      Quit date: 1993     Years since quittin.6     Passive " "exposure: Never    Smokeless tobacco: Never   Vaping Use    Vaping status: Never Used   Substance and Sexual Activity    Alcohol use: Yes     Alcohol/week: 1.0 standard drink of alcohol     Types: 1 Glasses of wine per week     Comment: Social    Drug use: Never    Sexual activity: Yes     Partners: Male     Birth control/protection: Post-menopausal, Male Sterilization        Objective   /77 (BP Location: Left arm, Patient Position: Sitting, Cuff Size: Large)   Temp 98.6 °F (37 °C) (Tympanic Core)   Ht 5' 5\" (1.651 m)   Wt 64.9 kg (143 lb)   BMI 23.80 kg/m²      Physical Exam  Vitals reviewed.   Constitutional:       General: She is not in acute distress.     Appearance: She is not ill-appearing.   HENT:      Head: Normocephalic and atraumatic.   Cardiovascular:      Rate and Rhythm: Normal rate and regular rhythm.   Pulmonary:      Effort: Pulmonary effort is normal.      Breath sounds: Normal breath sounds.   Abdominal:      General: Abdomen is flat. Bowel sounds are normal. There is no distension.      Palpations: Abdomen is soft.      Tenderness: There is no abdominal tenderness.   Skin:     General: Skin is warm and dry.   Neurological:      Mental Status: She is alert. Mental status is at baseline.           "

## 2025-03-20 NOTE — PATIENT INSTRUCTIONS
Patient stated that she would schedule a 3 month follow up and Christina Stated that it should be with SHELLY.

## 2025-03-25 DIAGNOSIS — R10.9 FLANK PAIN: Primary | ICD-10-CM

## 2025-03-30 DIAGNOSIS — K58.1 IRRITABLE BOWEL SYNDROME WITH CONSTIPATION: ICD-10-CM

## 2025-03-30 DIAGNOSIS — F41.9 ANXIETY: ICD-10-CM

## 2025-03-30 DIAGNOSIS — F32.A DEPRESSION, UNSPECIFIED DEPRESSION TYPE: ICD-10-CM

## 2025-03-31 RX ORDER — CITALOPRAM HYDROBROMIDE 40 MG/1
40 TABLET ORAL DAILY
Qty: 90 TABLET | Refills: 1 | Status: SHIPPED | OUTPATIENT
Start: 2025-03-31

## 2025-03-31 NOTE — TELEPHONE ENCOUNTER
Patient comment: Can you please modify this prescription to Celexa 40 mg as I went back up to my original dose?

## 2025-04-01 RX ORDER — TENAPANOR HYDROCHLORIDE 53.2 MG/1
50 TABLET ORAL 2 TIMES DAILY
Qty: 60 TABLET | Refills: 0 | OUTPATIENT
Start: 2025-04-01

## 2025-04-02 ENCOUNTER — TELEPHONE (OUTPATIENT)
Dept: GASTROENTEROLOGY | Facility: CLINIC | Age: 55
End: 2025-04-02

## 2025-04-02 NOTE — TELEPHONE ENCOUNTER
PA for TENAPANOR SUBMITTED to [facility]    via    []CMM-KEY:   [x]Surescripts-Case ID # [id number]   []Availity-Auth ID # NDC #   []Faxed to plan   []Other website   []Phone call Case ID #     [x]PA sent as URGENT    All office notes, labs and other pertaining documents and studies sent. Clinical questions answered. Awaiting determination from insurance company.     Turnaround time for your insurance to make a decision on your Prior Authorization can take 7-21 business days.

## 2025-04-02 NOTE — TELEPHONE ENCOUNTER
Forwarding to PA team for review.       Tenapanor HCl (Ibsrela) 50 MG TABS   Patient states she was told at her appointment last week that the PA would be started but it doesn't seem that it was.

## 2025-04-03 NOTE — TELEPHONE ENCOUNTER
PA for Tenapor  APPROVED     Date(s) approved 4/2/2025-4/02/2026    Case #    Patient advised by          []Nexiohart Message  []Phone call   []LMOM  [x]L/M to call office as no active Communication consent on file  []Unable to leave detailed message as VM not approved on Communication consent       Pharmacy advised by    [x]Fax  []Phone call  []Secure Chat    Specialty Pharmacy    []       Approval letter scanned into Media

## 2025-04-06 ENCOUNTER — RESULTS FOLLOW-UP (OUTPATIENT)
Dept: FAMILY MEDICINE CLINIC | Facility: CLINIC | Age: 55
End: 2025-04-06

## 2025-04-08 ENCOUNTER — HOSPITAL ENCOUNTER (OUTPATIENT)
Dept: RADIOLOGY | Facility: HOSPITAL | Age: 55
Discharge: HOME | End: 2025-04-08
Payer: COMMERCIAL

## 2025-04-08 DIAGNOSIS — R10.9 FLANK PAIN: ICD-10-CM

## 2025-04-08 PROCEDURE — 74176 CT ABD & PELVIS W/O CONTRAST: CPT

## 2025-04-09 ENCOUNTER — RESULTS FOLLOW-UP (OUTPATIENT)
Dept: UROLOGY | Facility: CLINIC | Age: 55
End: 2025-04-09

## 2025-04-09 NOTE — TELEPHONE ENCOUNTER
LVM providing office number for pt to call our office back     If pt calls back please relay Melina VILLALOBOS message     Office appointment was never schedule as requested. CT is negative.     ----- Message from Melina Whaley PA-C sent at 4/9/2025 12:56 PM EDT -----  Office appointment was never schedule as requested. CT is negative.

## 2025-04-14 ENCOUNTER — TELEPHONE (OUTPATIENT)
Age: 55
End: 2025-04-14

## 2025-04-14 ENCOUNTER — TELEPHONE (OUTPATIENT)
Dept: FAMILY MEDICINE CLINIC | Facility: CLINIC | Age: 55
End: 2025-04-14

## 2025-04-14 NOTE — TELEPHONE ENCOUNTER
SPOKE WITH PT, CALLING FOR UPDATE ON AUTHORIZATION FOR IBSRELA. PT REQUESTING A CALL FOR NEXT STEPS IN PROCESS. PT IS UNABLE TO ANSWER HER PHONE, PLEASE LEAVE A DETAILED MESSAGE OR COMMUNICATE VIA [employer]. THANK YOU.

## 2025-04-14 NOTE — TELEPHONE ENCOUNTER
Please see other encounter for Ibsrela  PA Case: [id number], Status: Approved, Coverage Starts on: 4/2/2025 12:00 AM, Coverage Ends on: 4/2/2026 12:00 AM. Questions? Contact [phone].   Payer: [facility] Case ID: [id number]  Script [facility] in [place]  Message sent to pt via "[employer]"

## 2025-04-14 NOTE — TELEPHONE ENCOUNTER
Prior Authorization requested for Wegovy. Your patient is due for a follow up visit for medication management and weight check. Patient's last office visit was 10/25/24. Once visit is completed please send message back to the prior authorization POD so a prior authorization can be submitted. Thank you

## 2025-04-18 DIAGNOSIS — E66.811 CLASS 1 OBESITY DUE TO EXCESS CALORIES WITH SERIOUS COMORBIDITY AND BODY MASS INDEX (BMI) OF 31.0 TO 31.9 IN ADULT: ICD-10-CM

## 2025-04-18 DIAGNOSIS — E66.09 CLASS 1 OBESITY DUE TO EXCESS CALORIES WITH SERIOUS COMORBIDITY AND BODY MASS INDEX (BMI) OF 31.0 TO 31.9 IN ADULT: ICD-10-CM

## 2025-04-20 RX ORDER — SEMAGLUTIDE 1.7 MG/.75ML
INJECTION, SOLUTION SUBCUTANEOUS
Qty: 3 ML | Refills: 5 | Status: SHIPPED | OUTPATIENT
Start: 2025-04-20

## 2025-04-24 ENCOUNTER — APPOINTMENT (OUTPATIENT)
Dept: LAB | Facility: CLINIC | Age: 55
End: 2025-04-24
Attending: PREVENTIVE MEDICINE

## 2025-04-24 DIAGNOSIS — Z00.8 HEALTH EXAMINATION IN POPULATION SURVEY: ICD-10-CM

## 2025-04-24 LAB
CHOLEST SERPL-MCNC: 224 MG/DL (ref ?–200)
EST. AVERAGE GLUCOSE BLD GHB EST-MCNC: 105 MG/DL
HBA1C MFR BLD: 5.3 %
HDLC SERPL-MCNC: 86 MG/DL
LDLC SERPL CALC-MCNC: 120 MG/DL (ref 0–100)
NONHDLC SERPL-MCNC: 138 MG/DL
TRIGL SERPL-MCNC: 90 MG/DL (ref ?–150)

## 2025-04-24 PROCEDURE — 83036 HEMOGLOBIN GLYCOSYLATED A1C: CPT

## 2025-04-24 PROCEDURE — 36415 COLL VENOUS BLD VENIPUNCTURE: CPT

## 2025-04-24 PROCEDURE — 80061 LIPID PANEL: CPT

## 2025-04-25 ENCOUNTER — OFFICE VISIT (OUTPATIENT)
Dept: FAMILY MEDICINE CLINIC | Facility: CLINIC | Age: 55
End: 2025-04-25
Payer: COMMERCIAL

## 2025-04-25 ENCOUNTER — PATIENT MESSAGE (OUTPATIENT)
Dept: FAMILY MEDICINE CLINIC | Facility: CLINIC | Age: 55
End: 2025-04-25

## 2025-04-25 VITALS
HEART RATE: 86 BPM | OXYGEN SATURATION: 96 % | SYSTOLIC BLOOD PRESSURE: 118 MMHG | WEIGHT: 145 LBS | TEMPERATURE: 97.5 F | DIASTOLIC BLOOD PRESSURE: 60 MMHG | RESPIRATION RATE: 17 BRPM | BODY MASS INDEX: 24.16 KG/M2 | HEIGHT: 65 IN

## 2025-04-25 DIAGNOSIS — E66.811 CLASS 1 OBESITY DUE TO EXCESS CALORIES WITH SERIOUS COMORBIDITY AND BODY MASS INDEX (BMI) OF 31.0 TO 31.9 IN ADULT: ICD-10-CM

## 2025-04-25 DIAGNOSIS — E66.09 CLASS 1 OBESITY DUE TO EXCESS CALORIES WITH SERIOUS COMORBIDITY AND BODY MASS INDEX (BMI) OF 31.0 TO 31.9 IN ADULT: ICD-10-CM

## 2025-04-25 DIAGNOSIS — G43.809 OTHER MIGRAINE WITHOUT STATUS MIGRAINOSUS, NOT INTRACTABLE: Primary | ICD-10-CM

## 2025-04-25 PROCEDURE — 99214 OFFICE O/P EST MOD 30 MIN: CPT | Performed by: NURSE PRACTITIONER

## 2025-04-25 RX ORDER — SEMAGLUTIDE 1 MG/.5ML
INJECTION, SOLUTION SUBCUTANEOUS
Qty: 2 ML | Refills: 5 | Status: SHIPPED | OUTPATIENT
Start: 2025-04-25 | End: 2025-04-25

## 2025-04-25 NOTE — PROGRESS NOTES
Name: Alina Sharma      : 1970      MRN: [id number]  Encounter Provider: ANIVAL Connelly  Encounter Date: 2025   Encounter department: [facility]    Assessment & Plan  Other migraine without status migrainosus, not intractable  Stable  Cont meds           Class 1 obesity due to excess calories with serious comorbidity and body mass index (BMI) of 31.0 to 31.9 in adult  Prior Authorization Clinical Questions for Weight Management Pharmacotherapy    2. Does the patient have a diagnosis of obesity, confirmed by a BMI greater than or equal to 30 kg/m^2?: No  3. Does the patient have a BMI of greater than or equal to 27 kg/m^2 with at least one weight-related comorbidity/risk factor/complication (e.g. diabetes, dyslipidemia, coronary artery disease)?: No  9. Does the patient have a history of type 2 diabetes?: No  For renewals: Has the patient had a positive outcome with current weight management medication (i.e., change in body weight of at least 4-5% after 12-16 weeks on maximally tolerated dose)?: Yes     Baseline weight (in pounds): 172 lbs  Current weight (in pounds): 145 lbs  Weight loss percentage: -15.7%                Depression Screening and Follow-up Plan: Patient was screened for depression during today's encounter. They screened negative with a PHQ-9 score of 1.        History of Present Illness     Here for f/u to chronic medical conditions  Doing well on weight loss meds  Has not been able to get script filled with [facility], not sure why  Will send for prior auth renewal and send for refill  Feels well  Daughter getting  this fall  Migraines stable      Review of Systems   Constitutional:  Negative for fatigue and fever.   HENT:  Negative for congestion, postnasal drip and rhinorrhea.    Eyes:  Negative for photophobia and visual disturbance.   Respiratory:  Negative for cough, shortness of breath and wheezing.    Cardiovascular:  Negative for chest pain and  palpitations.   Gastrointestinal:  Negative for constipation, diarrhea, nausea and vomiting.   Genitourinary:  Negative for dysuria and frequency.   Musculoskeletal:  Negative for arthralgias and myalgias.   Skin:  Negative for rash.   Neurological:  Negative for seizures, numbness and headaches.   Hematological:  Negative for adenopathy.   Psychiatric/Behavioral:  Negative for dysphoric mood and sleep disturbance. The patient is not nervous/anxious.      Past Medical History:   Diagnosis Date   • Allergic 1974    Mold mildew dust   • Anxiety    • Asthma     Last assessed 2014   • Colon polyp    • Constipation    • Depression     First pregnancy   • Eczema    • Irritable bowel syndrome (IBS)     Constipation   • Kidney stone Unknown    See chart   • Seasonal allergies    • [place] teeth extracted      Past Surgical History:   Procedure Laterality Date   • COLONOSCOPY     • HYSTERECTOMY     • LAPAROSCOPY FOR ECTOPIC PREGNANCY     • NE BLEPHAROPLASTY UPPER EYELID Bilateral 2023    Procedure: BILATERAL BLEPHAROPLASTY UPPER;  Surgeon: Soraya German DO;  Location[facility] OR;  Service: Plastics   • TONSILLECTOMY AND ADENOIDECTOMY     • WISDOM TOOTH EXTRACTION       Family History   Problem Relation Age of Onset   • Hypertension Mother    • Colon cancer Father 48   • Cancer Father          colon   • No Known Problems Sister    • No Known Problems Daughter    • No Known Problems Maternal Grandmother    • No Known Problems Maternal Grandfather    • No Known Problems Paternal Grandmother    • No Known Problems Paternal Grandfather    • No Known Problems Maternal Aunt      Social History     Tobacco Use   • Smoking status: Former     Current packs/day: 0.00     Average packs/day: 0.3 packs/day for 10.6 years (2.7 ttl pk-yrs)     Types: Cigarettes     Start date:      Quit date: 1993     Years since quittin.7     Passive exposure: Never   • Smokeless tobacco: Never   Vaping  "Use   • Vaping status: Never Used   Substance and Sexual Activity   • Alcohol use: Yes     Alcohol/week: 1.0 standard drink of alcohol     Types: 1 Glasses of wine per week     Comment: Social   • Drug use: Never   • Sexual activity: Yes     Partners: Male     Birth control/protection: Post-menopausal, Male Sterilization     Current Outpatient Medications on File Prior to Visit   Medication Sig   • ALPRAZolam (XANAX) 0.25 mg tablet Take 1 tablet (0.25 mg total) by mouth 3 (three) times a day as needed for anxiety   • b complex vitamins capsule Take 1 capsule by mouth daily   • B-D ALLERGY SYRINGE 1CC/28G 28G X 1/2\" 1 ML MISC    • Butalbital-APAP-Caffeine (Fioricet) -40 MG CAPS Take 1 tablet by mouth daily as needed (ha)   • Cholecalciferol (VITAMIN D3) 2000 units capsule Take 2 capsules by mouth daily     • citalopram (CeleXA) 40 mg tablet Take 1 tablet (40 mg total) by mouth daily   • cyclobenzaprine (FLEXERIL) 10 mg tablet Take 1 tablet (10 mg total) by mouth 3 (three) times a day as needed for muscle spasms   • docusate sodium (COLACE) 100 mg capsule Take 1 capsule (100 mg total) by mouth 2 (two) times a day   • Dulera 100-5 MCG/ACT inhaler Inhale 2 puffs 2 (two) times a day   • Dupixent subcutaneous injection Inject 2 mL (300 mg total) under the skin every 14 (fourteen) days   • estradiol (ESTRACE) 1 mg tablet TAKE 1 1/2 TABLETS BY MOUTH DAILY   • fluticasone (FLONASE) 50 mcg/act nasal spray 2 sprays into each nostril every morning   • INSULIN SYRINGE 1CC/29G 29G X 1/2\" 1 ML MISC    • levocetirizine (XYZAL) 5 MG tablet Take 1 tablet (5 mg total) by mouth every evening   • MAGNESIUM PO Take 1 capsule by mouth daily   • ondansetron (ZOFRAN) 4 mg tablet Take 1 tablet (4 mg total) by mouth every 8 (eight) hours as needed for nausea or vomiting   • polyethylene glycol (MIRALAX) 17 g packet Take 17 g by mouth daily   • Progesterone 100 MG CAPS Take 100 mg by mouth at bedtime   • Prucalopride Succinate 2 MG TABS " "Take 2 mg by mouth daily   • Restasis 0.05 % ophthalmic emulsion Administer 1 drop to both eyes every 12 (twelve) hours   • Semaglutide-Weight Management (Wegovy) 1.7 MG/0.75ML Inject 1.7 mg under the skin weekly   • Tenapanor HCl (Ibsrela) 50 MG TABS Take 50 mg by mouth 2 (two) times a day   • tobramycin-dexamethasone (TOBRADEX) ophthalmic suspension Administer 1 drop to both eyes if needed   • TURMERIC PO Take 1 capsule by mouth daily   • valACYclovir (VALTREX) 500 mg tablet Take 1 tablet (500 mg total) by mouth every morning   • Ventolin  (90 Base) MCG/ACT inhaler Inhale 2 puffs every 4 (four) hours as needed for wheezing   • [DISCONTINUED] Semaglutide-Weight Management (Wegovy) 1 MG/0.5ML Inject 1 mg under the skin weekly   • [DISCONTINUED] Wegovy 1 MG/0.5ML Inject 1 mg under the skin weekly. (Patient not taking: No sig reported)     No Known Allergies  Immunization History   Administered Date(s) Administered   • COVID-19 Moderna mRNA Vaccine 12 Yr+ 50 mcg/0.5 mL (Spikevax) 10/10/2024   • COVID-19 PFIZER VACCINE 0.3 ML IM 12/23/2020, 01/12/2021, 08/21/2021, 02/05/2022   • COVID-19 Pfizer Vac BIVALENT Santana-sucrose 12 Yr+ IM 10/27/2022, 10/27/2022   • H1N1, All Formulations 11/02/2009   • INFLUENZA 10/24/2019, 11/08/2020, 10/01/2024   • Influenza, seasonal, injectable 10/15/2013   • Influenza, seasonal, injectable, preservative free 10/24/2019   • Tdap 06/18/2021     Objective   /60 (BP Location: Left arm, Patient Position: Sitting, Cuff Size: Standard)   Pulse 86   Temp 97.5 °F (36.4 °C) (Tympanic)   Resp 17   Ht 5' 5\" (1.651 m)   Wt 65.8 kg (145 lb)   SpO2 96%   BMI 24.13 kg/m²     Physical Exam  Vitals and nursing note reviewed.   Constitutional:       Appearance: Normal appearance.   HENT:      Head: Normocephalic and atraumatic.      Right Ear: Tympanic membrane, ear canal and external ear normal.      Left Ear: Tympanic membrane, ear canal and external ear normal.      Nose: Nose normal. "      Mouth/Throat:      Mouth: Mucous membranes are moist.   Eyes:      Conjunctiva/sclera: Conjunctivae normal.   Cardiovascular:      Rate and Rhythm: Normal rate and regular rhythm.      Heart sounds: Normal heart sounds.   Pulmonary:      Effort: Pulmonary effort is normal.      Breath sounds: Normal breath sounds.   Abdominal:      General: Bowel sounds are normal.      Palpations: Abdomen is soft.   Musculoskeletal:         General: Normal range of motion.      Cervical back: Normal range of motion and neck supple.   Skin:     General: Skin is warm and dry.      Capillary Refill: Capillary refill takes less than 2 seconds.   Neurological:      General: No focal deficit present.      Mental Status: She is alert and oriented to person, place, and time.   Psychiatric:         Mood and Affect: Mood normal.         Behavior: Behavior normal.

## 2025-04-25 NOTE — ASSESSMENT & PLAN NOTE
Prior Authorization Clinical Questions for Weight Management Pharmacotherapy    2. Does the patient have a diagnosis of obesity, confirmed by a BMI greater than or equal to 30 kg/m^2?: No  3. Does the patient have a BMI of greater than or equal to 27 kg/m^2 with at least one weight-related comorbidity/risk factor/complication (e.g. diabetes, dyslipidemia, coronary artery disease)?: No  9. Does the patient have a history of type 2 diabetes?: No  For renewals: Has the patient had a positive outcome with current weight management medication (i.e., change in body weight of at least 4-5% after 12-16 weeks on maximally tolerated dose)?: Yes     Baseline weight (in pounds): 172 lbs  Current weight (in pounds): 145 lbs  Weight loss percentage: -15.7%

## 2025-04-29 NOTE — TELEPHONE ENCOUNTER
PA for wegovy 1.7mg SUBMITTED to [facility]    via    []CMM-KEY:   [x]Surescripts-Case ID # [id number]   []Availity-Auth ID # NDC #   []Faxed to plan   []Other website   []Phone call Case ID #     [x]PA sent as URGENT    All office notes, labs and other pertaining documents and studies sent. Clinical questions answered. Awaiting determination from insurance company.     Turnaround time for your insurance to make a decision on your Prior Authorization can take 7-21 business days.

## 2025-04-30 NOTE — TELEPHONE ENCOUNTER
PA for WEGOVY 1.7MG APPROVED     Date(s) approved 4/29/25-4/29/26    Case #[id number]     Patient advised by          []MyChart Message  [x]Phone call   [x]LMOM  []L/M to call office as no active Communication consent on file  []Unable to leave detailed message as VM not approved on Communication consent       Pharmacy advised by    [x]Fax  []Phone call  []Secure Chat    Specialty Pharmacy    []     Approval letter scanned into Media Yes

## 2025-05-03 DIAGNOSIS — J30.1 ALLERGIC RHINITIS DUE TO POLLEN, UNSPECIFIED SEASONALITY: ICD-10-CM

## 2025-05-04 RX ORDER — LEVOCETIRIZINE DIHYDROCHLORIDE 5 MG/1
5 TABLET, FILM COATED ORAL EVERY EVENING
Qty: 90 TABLET | Refills: 1 | Status: SHIPPED | OUTPATIENT
Start: 2025-05-04

## 2025-05-28 DIAGNOSIS — R11.0 NAUSEA: ICD-10-CM

## 2025-05-28 DIAGNOSIS — K58.1 IRRITABLE BOWEL SYNDROME WITH CONSTIPATION: ICD-10-CM

## 2025-05-28 RX ORDER — TENAPANOR HYDROCHLORIDE 53.2 MG/1
50 TABLET ORAL 2 TIMES DAILY
Qty: 60 TABLET | Refills: 0 | Status: SHIPPED | OUTPATIENT
Start: 2025-05-28

## 2025-05-29 RX ORDER — ONDANSETRON 4 MG/1
4 TABLET, FILM COATED ORAL EVERY 8 HOURS PRN
Qty: 20 TABLET | Refills: 0 | Status: SHIPPED | OUTPATIENT
Start: 2025-05-29

## 2025-06-06 DIAGNOSIS — N95.1 MENOPAUSAL SYMPTOMS: ICD-10-CM

## 2025-06-06 RX ORDER — PROGESTERONE 100 MG/1
100 CAPSULE ORAL
Qty: 90 CAPSULE | Refills: 3 | Status: SHIPPED | OUTPATIENT
Start: 2025-06-06

## 2025-06-07 DIAGNOSIS — K58.1 IRRITABLE BOWEL SYNDROME WITH CONSTIPATION: ICD-10-CM

## 2025-06-07 DIAGNOSIS — A60.09 HERPES GENITALIS IN WOMEN: ICD-10-CM

## 2025-06-08 RX ORDER — VALACYCLOVIR HYDROCHLORIDE 500 MG/1
TABLET, FILM COATED ORAL
Qty: 90 TABLET | Refills: 0 | Status: SHIPPED | OUTPATIENT
Start: 2025-06-08 | End: 2026-06-03

## 2025-06-08 RX ORDER — VALACYCLOVIR HYDROCHLORIDE 500 MG/1
500 TABLET, FILM COATED ORAL EVERY MORNING
Qty: 90 TABLET | Refills: 0 | OUTPATIENT
Start: 2025-06-08 | End: 2026-06-03

## 2025-06-09 RX ORDER — TENAPANOR HYDROCHLORIDE 53.2 MG/1
50 TABLET ORAL 2 TIMES DAILY
Qty: 60 TABLET | Refills: 0 | OUTPATIENT
Start: 2025-06-09

## 2025-06-12 ENCOUNTER — PATIENT MESSAGE (OUTPATIENT)
Age: 55
End: 2025-06-12

## 2025-06-12 ENCOUNTER — TELEMEDICINE (OUTPATIENT)
Age: 55
End: 2025-06-12
Payer: COMMERCIAL

## 2025-06-12 DIAGNOSIS — G44.82 ORGASMIC HEADACHE: Primary | ICD-10-CM

## 2025-06-12 DIAGNOSIS — N95.1 MENOPAUSAL SYMPTOMS: ICD-10-CM

## 2025-06-12 DIAGNOSIS — G43.909 MIGRAINE WITHOUT STATUS MIGRAINOSUS, NOT INTRACTABLE, UNSPECIFIED MIGRAINE TYPE: ICD-10-CM

## 2025-06-12 PROBLEM — E66.09 CLASS 1 OBESITY DUE TO EXCESS CALORIES WITH SERIOUS COMORBIDITY IN ADULT: Status: RESOLVED | Noted: 2022-03-18 | Resolved: 2025-06-12

## 2025-06-12 PROBLEM — E66.811 CLASS 1 OBESITY DUE TO EXCESS CALORIES WITH SERIOUS COMORBIDITY IN ADULT: Status: RESOLVED | Noted: 2022-03-18 | Resolved: 2025-06-12

## 2025-06-12 PROBLEM — B35.1 ONYCHOMYCOSIS OF GREAT TOE: Status: RESOLVED | Noted: 2023-07-21 | Resolved: 2025-06-12

## 2025-06-12 PROCEDURE — 99213 OFFICE O/P EST LOW 20 MIN: CPT | Performed by: OBSTETRICS & GYNECOLOGY

## 2025-06-12 RX ORDER — SUMATRIPTAN 20 MG/1
1 SPRAY NASAL AS NEEDED
Qty: 6 EACH | Refills: 1 | Status: SHIPPED | OUTPATIENT
Start: 2025-06-12

## 2025-06-12 NOTE — ASSESSMENT & PLAN NOTE
Orders:  •  SUMAtriptan (Imitrex) 20 MG/ACT nasal spray; 1 spray (20 mg total) into each nostril if needed for migraine

## 2025-06-12 NOTE — PROGRESS NOTES
Virtual Regular Visit  Name: Alina Sharma      : 1970      MRN: [id number]  Encounter Provider: Alexia Montes MD  Encounter Date: 2025   Encounter department: [facility] [place]  :  Assessment & Plan  Orgasmic headache    Orders:    SUMAtriptan (Imitrex) 20 MG/ACT nasal spray; 1 spray (20 mg total) into each nostril if needed for migraine    Migraine without status migrainosus, not intractable, unspecified migraine type    Orders:    SUMAtriptan (Imitrex) 20 MG/ACT nasal spray; 1 spray (20 mg total) into each nostril if needed for migraine    Menopausal symptoms         1) This orgasmic thunderclap headache is likely a migraine variant. Offered Imitrex spray or injection to have on hand for faster relief if needed. She prefers to try the nasal form. If ineffective would refer back to neurology for further options such as beta blockers, other tryptans, etc  2) Overall, feels great on hormone therapy, and the benefits continue to outweigh the risks. May continue indefinitely as long as B>R. Refill meds when needed. Follow up prn or 1-2 years.     This was a 20 minute visit with greater than 50% of time spent in face to face counseling and coordination of care      Alina presents for hormonal consult follow up. I saw her last in 10/23 with complaints of menopausal symptoms, s/p TLH.   Estradiol 1 mg/ progesterone 100 mg prescribed. Since then:  1) dose of estradiol increased from 1 to 1.5 mg daily which has worked well.  2) Recently had episode of orgasmic headache, thunderclap in nature. Has history of migraines, took her fioricet which took awhile to kick in, about 30 minutes. Now afraid of sexual activity.  No other changes in health history of X updates.     Review of Systems   Constitutional: Negative.    Endocrine: Negative.    Genitourinary: Negative.    Musculoskeletal: Negative.    Skin: Negative.    Neurological:  Positive for headaches. Negative for seizures  and syncope.   Psychiatric/Behavioral: Negative.             Physical Exam    Administrative Statements   Encounter provider Alexia Montes MD    The Patient is located at Other and in the following state in which I hold an active license [place].    The patient was identified by name and date of birth. Alina Sharma was informed that this is a telemedicine visit and that the visit is being conducted through the Epic Embedded platform. She agrees to proceed..  My office door was closed. No one else was in the room.  She acknowledged consent and understanding of privacy and security of the video platform. The patient has agreed to participate and understands they can discontinue the visit at any time.    I have spent a total time of 20 minutes in caring for this patient on the day of the visit/encounter including Impressions, Counseling / Coordination of care, Documenting in the medical record, Reviewing/placing orders in the medical record (including tests, medications, and/or procedures), Obtaining or reviewing history  , and Communicating with other healthcare professionals , not including the time spent for establishing the audio/video connection.

## 2025-06-27 ENCOUNTER — OFFICE VISIT (OUTPATIENT)
Dept: GASTROENTEROLOGY | Facility: CLINIC | Age: 55
End: 2025-06-27
Payer: COMMERCIAL

## 2025-06-27 VITALS
DIASTOLIC BLOOD PRESSURE: 78 MMHG | HEIGHT: 65 IN | WEIGHT: 164.6 LBS | TEMPERATURE: 97.5 F | BODY MASS INDEX: 27.42 KG/M2 | SYSTOLIC BLOOD PRESSURE: 136 MMHG

## 2025-06-27 DIAGNOSIS — K58.1 IRRITABLE BOWEL SYNDROME WITH CONSTIPATION: Primary | ICD-10-CM

## 2025-06-27 DIAGNOSIS — Z80.0 FAMILY HISTORY OF COLON CANCER: ICD-10-CM

## 2025-06-27 DIAGNOSIS — Z86.0100 HISTORY OF COLONIC POLYPS: ICD-10-CM

## 2025-06-27 PROCEDURE — 99213 OFFICE O/P EST LOW 20 MIN: CPT | Performed by: PHYSICIAN ASSISTANT

## 2025-06-27 RX ORDER — TENAPANOR HYDROCHLORIDE 53.2 MG/1
50 TABLET ORAL 2 TIMES DAILY
Qty: 180 TABLET | Refills: 3 | Status: SHIPPED | OUTPATIENT
Start: 2025-06-27

## 2025-06-27 NOTE — PROGRESS NOTES
Name: Alina Sharma      : 1970      MRN: [id number]  Encounter Provider: Danette Booker PA-C  Encounter Date: 2025   Encounter department: [facility] [place]  :  Assessment & Plan  Irritable bowel syndrome with constipation  Stable and improved with IBSRELA 50 mg BID.  No new complaints today, no alarming symptoms.  Her abdominal exam is benign.    Recommend patient continue with current medication regimen for now.  We discussed can trial taking fiber supplement like Metamucil daily in addition to the IBSRELA 50 mg BID or MiraLAX daily and see if this further improves constipation.  Otherwise, no plans for further workup or intervention at this time from a GI standpoint  Encouraged ongoing hydration and high-fiber diet    All questions answered.  Orders:    Tenapanor HCl (Ibsrela) 50 MG TABS; Take 50 mg by mouth 2 (two) times a day    Family history of colon cancer  Patient will be due for CRC screening/repeat colonoscopy in 2028       History of colonic polyps  As above         Patient was instructed to call the office with any questions, concerns, new/ worsening/ persisting GI symptoms.  Patient expressed understanding and is in agreement with treatment plan.     Will plan to follow up in 1 year    History of Present Illness     Alina Sharma is a 54 y.o. female with a past medical history of migraines, allergies, constipation by delayed colonic transit, history of anxiety and depression, personal history of colonic polyps, family history of colon cancer, history of rectocele presents to the office today for follow-up.  Patient was last seen in the office by Christina VILLALOBOS 3/20/2025, previous office note was reviewed.  At last office visit patient was started on IBSRELA 50 mg twice daily for chronic constipation.    Patient tells me that since last office visit she is feeling better.  She is taking IBSRELA 50 mg twice daily and having 1 formed  "complete spontaneous bowel movement per week.  She can have occasional smaller stools throughout the week as well but she often has incomplete defecation with this.  She tells me that her constipation is overall improved with this new medication and she is very pleased with this.  She has no new complaints today.  She is eating and drinking well.  She denies abdominal pain, nausea, vomiting, difficulty swallowing, rectal bleeding.  Her weight has been stable.  She would like to continue with this medication regimen    For constipation in the past she has tried and failed Linzess, Trulance, Amitiza, Motegrity, pelvic floor physical therapy    Patient is up-to-date on CRC screening.  She will be due for repeat screening colonoscopy in September 2028.    Patient's daughter is getting  this September.  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.    Review of Systems   Constitutional:  Negative for chills and fever.   HENT:  Negative for ear pain and sore throat.    Eyes:  Negative for pain and visual disturbance.   Respiratory:  Negative for cough and shortness of breath.    Cardiovascular:  Negative for chest pain and palpitations.   Gastrointestinal:  Positive for constipation. Negative for abdominal pain and vomiting.   Genitourinary:  Negative for dysuria and hematuria.   Musculoskeletal:  Negative for arthralgias and back pain.   Skin:  Negative for color change and rash.   Neurological:  Negative for seizures and syncope.   All other systems reviewed and are negative.      Objective   /78   Temp 97.5 °F (36.4 °C) (Axillary)   Ht 5' 5\" (1.651 m)   Wt 74.7 kg (164 lb 9.6 oz)   BMI 27.39 kg/m²      Physical Exam  Vitals reviewed.   Constitutional:       General: She is not in acute distress.     Appearance: She is not toxic-appearing.   HENT:      Head: Normocephalic and atraumatic.     Eyes:      Extraocular Movements: Extraocular movements intact.      Conjunctiva/sclera: Conjunctivae normal. "       Cardiovascular:      Rate and Rhythm: Normal rate and regular rhythm.   Pulmonary:      Effort: Pulmonary effort is normal. No respiratory distress.      Breath sounds: Normal breath sounds.   Abdominal:      General: Bowel sounds are normal.      Palpations: Abdomen is soft.      Tenderness: There is no abdominal tenderness.     Musculoskeletal:         General: No swelling or tenderness.      Cervical back: Normal range of motion and neck supple.     Skin:     General: Skin is warm and dry.      Coloration: Skin is not jaundiced.     Neurological:      General: No focal deficit present.      Mental Status: She is alert and oriented to person, place, and time. Mental status is at baseline.     Psychiatric:         Mood and Affect: Mood normal.         Behavior: Behavior normal.         Thought Content: Thought content normal.     Lab Results   Component Value Date    WBC 5.50 12/04/2023    HGB 13.6 12/04/2023    HCT 41.6 12/04/2023     (H) 12/04/2023     12/04/2023     Lab Results   Component Value Date     04/25/2014    SODIUM 139 12/04/2023    K 4.0 12/04/2023     12/04/2023    CO2 29 12/04/2023    ANIONGAP 5 04/25/2014    AGAP 7 12/04/2023    BUN 17 12/04/2023    CREATININE 0.88 12/04/2023    GLUC 80 12/30/2017    GLUF 83 12/04/2023    CALCIUM 9.3 12/04/2023    AST 21 05/29/2022    ALT 17 05/29/2022    ALKPHOS 39 (L) 05/29/2022    PROT 7.4 04/25/2014    TP 7.7 05/29/2022    BILITOT 0.50 04/25/2014    TBILI 0.87 05/29/2022    EGFR 74 12/04/2023

## 2025-06-27 NOTE — ASSESSMENT & PLAN NOTE
Stable and improved with IBSRELA 50 mg BID.  No new complaints today, no alarming symptoms.  Her abdominal exam is benign.    Recommend patient continue with current medication regimen for now.  We discussed can trial taking fiber supplement like Metamucil daily in addition to the IBSRELA 50 mg BID or MiraLAX daily and see if this further improves constipation.  Otherwise, no plans for further workup or intervention at this time from a GI standpoint  Encouraged ongoing hydration and high-fiber diet    All questions answered.  Orders:    Tenapanor HCl (Ibsrela) 50 MG TABS; Take 50 mg by mouth 2 (two) times a day

## 2025-07-02 ENCOUNTER — TELEPHONE (OUTPATIENT)
Age: 55
End: 2025-07-02

## 2025-07-02 DIAGNOSIS — L20.9 ATOPIC DERMATITIS, UNSPECIFIED TYPE: ICD-10-CM

## 2025-07-02 RX ORDER — DUPILUMAB 300 MG/2ML
300 INJECTION, SOLUTION SUBCUTANEOUS
Qty: 4 ML | Refills: 12 | Status: SHIPPED | OUTPATIENT
Start: 2025-07-02 | End: 2026-07-02

## 2025-07-02 NOTE — TELEPHONE ENCOUNTER
Patient's last prior authorization obtained is good until 6/6/25.  Per insurance, she will require a follow up to initiate PA again.  Can look at Fridays with other providers or see if virtuals are available to accommodate her schedule.

## 2025-07-02 NOTE — TELEPHONE ENCOUNTER
Spoke with the patient today regarding scheduling her next appointment. Attempted to arrange the earliest available appointment, which is on July 7, 2025. However, the patient declined to  her schedule at that time.    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 Unfortunately, the next available Friday appointment is not until October.    The patient inquired about how her other medications were being approved despite her not being seen, and the need for  a 6-month follow-up for approval.She expressed concern and requested a callback to discuss options, including either scheduling an appointment on a Friday or obtaining a refill to bridge until her next appointment, should she need to wait.

## 2025-07-02 NOTE — TELEPHONE ENCOUNTER
Patient is scheduled for a virtual on 7/18 at 7:20 with Miguel. Her dupixent injection was June 23rd and due for another injection on 7/7. Patient is asking if there is anything we can do for her in the mean time until her virtual apt.

## 2025-07-02 NOTE — TELEPHONE ENCOUNTER
Received fax from [employer]. Prior auth is needed for Dupixent.    Please initiate prior auth    Key:  [id number]

## 2025-07-02 NOTE — TELEPHONE ENCOUNTER
Patient has not been seen in over 1 year. Last office visit / telemedicine was 3/29/23. Please have patient come in for an office visit before a reauthorization can be submitted for dupixent.     Once completed please send back to the prior authorization team.     Thank you

## 2025-07-03 RX ORDER — TRIAMCINOLONE ACETONIDE 1 MG/G
CREAM TOPICAL 2 TIMES DAILY
Qty: 80 G | Refills: 1 | Status: SHIPPED | OUTPATIENT
Start: 2025-07-03

## 2025-07-03 NOTE — TELEPHONE ENCOUNTER
I called patient and left voicemail. I explained that we did try to submit the Dupixent refill since she is scheduled for 7/18. However we are unable to submit a prior authorization due to not having an updated visit note within the 1 year. I explained we did send over a topical steroid to try and help with Flares for the time being. Advised patient to call back if she had any questions.

## 2025-07-03 NOTE — TELEPHONE ENCOUNTER
ELISA bryson'josh via fax from [facility] for Dupixent 300mg auto-injectors    Scanned into chart

## 2025-07-03 NOTE — TELEPHONE ENCOUNTER
Received call from patient stating she was returning Nancy's call.    Patient asked to be laura for an earlier date because on 07/18 she will be going out of town.    After looking through the [facility] I told patient that there were no sooner appt's before the 18th.    Patient then asked to be put on the cancellation list.    Patient is on the cancellation list

## 2025-07-07 DIAGNOSIS — J45.20 MILD INTERMITTENT ASTHMA WITHOUT COMPLICATION: ICD-10-CM

## 2025-07-07 DIAGNOSIS — J30.1 ALLERGIC RHINITIS DUE TO POLLEN, UNSPECIFIED SEASONALITY: ICD-10-CM

## 2025-07-07 NOTE — TELEPHONE ENCOUNTER
PA for Dupixent 300mg pen SUBMITTED to [employer]     via    []CMM-KEY:   [x]Surescripts-Case ID # [id number]     []Availity-Auth ID # NDC #   []Faxed to plan   []Other website   []Phone call Case ID #     [x]PA sent as URGENT    All office notes, labs and other pertaining documents and studies sent. Clinical questions answered. Awaiting determination from insurance company.     Turnaround time for your insurance to make a decision on your Prior Authorization can take 7-21 business days.

## 2025-07-08 NOTE — TELEPHONE ENCOUNTER
PA for Dupixent 300mg auto injector  APPROVED     Date(s) approved until 07/07/2026    Case #[id number]     Patient advised by          []MyChart Message  []Phone call   [x]LMOM  []L/M to call office as no active Communication consent on file  []Unable to leave detailed message as VM not approved on Communication consent       Pharmacy advised by    []Fax  [x]Phone call  []Secure Chat    Specialty Pharmacy    [x][facility]      Approval letter scanned into Media No

## 2025-07-09 RX ORDER — FLUTICASONE PROPIONATE 50 MCG
2 SPRAY, SUSPENSION (ML) NASAL EVERY MORNING
Qty: 16 ML | Refills: 0 | Status: SHIPPED | OUTPATIENT
Start: 2025-07-09

## 2025-07-09 RX ORDER — MOMETASONE FUROATE AND FORMOTEROL FUMARATE DIHYDRATE 100; 5 UG/1; UG/1
2 AEROSOL RESPIRATORY (INHALATION) 2 TIMES DAILY
Qty: 13 G | Refills: 0 | Status: SHIPPED | OUTPATIENT
Start: 2025-07-09

## 2025-07-18 ENCOUNTER — TELEMEDICINE (OUTPATIENT)
Dept: DERMATOLOGY | Facility: CLINIC | Age: 55
End: 2025-07-18
Payer: COMMERCIAL

## 2025-07-18 DIAGNOSIS — L20.9 ATOPIC DERMATITIS, UNSPECIFIED TYPE: ICD-10-CM

## 2025-07-18 PROCEDURE — 98006 SYNCH AUDIO-VIDEO EST MOD 30: CPT | Performed by: NURSE PRACTITIONER

## 2025-07-18 RX ORDER — DUPILUMAB 300 MG/2ML
300 INJECTION, SOLUTION SUBCUTANEOUS
Qty: 4 ML | Refills: 12 | Status: SHIPPED | OUTPATIENT
Start: 2025-07-18 | End: 2026-07-18

## 2025-07-18 NOTE — PROGRESS NOTES
Virtual Regular Visit  Name: Alina Sharma      : 1970      MRN: [id number]  Encounter Provider: ANIVAL Zaragoza  Encounter Date: 2025   Encounter department: [facility] DERMATOLOGY [place]  :  Assessment & Plan  Atopic dermatitis, unspecified type    Orders:    Dupixent subcutaneous injection; Inject 2 mL (300 mg total) under the skin every 14 (fourteen) days          History of Present Illness     HPI  Review of Systems    Objective   There were no vitals taken for this visit.    Physical Exam    Administrative Statements   Encounter provider ANIVAL Zaragoza    The Patient is located at Home and in the following state in which I hold an active license [place].    The patient was identified by name and date of birth. Alina Sharma was informed that this is a telemedicine visit and that the visit is being conducted through the Epic Embedded platform. She agrees to proceed..  My office door was closed. No one else was in the room.  She acknowledged consent and understanding of privacy and security of the video platform. The patient has agreed to participate and understands they can discontinue the visit at any time.    I have spent a total time of 25 minutes in caring for this patient on the day of the visit/encounter including Diagnostic results, Prognosis, Risks and benefits of tx options, Instructions for management, Patient and family education, Importance of tx compliance, Risk factor reductions, Impressions, Counseling / Coordination of care, Documenting in the medical record, Reviewing/placing orders in the medical record (including tests, medications, and/or procedures), Obtaining or reviewing history  , and Communicating with other healthcare professionals , not including the time spent for establishing the audio/video connection.    [facility] D[facility]atology [facility] Note     Patient Name: Alina Sharma  Encounter Date: 25       Have you been cared for by a [facility]  "Dermatologist in the last 3 years and, if so, which description applies to you? Yes. I have been here within the last 3 years, and my medical history has NOT changed since that time. I am of child-bearing potential.     REVIEW OF SYSTEMS:  Have you recently had or currently have any of the following? No changes in my recent health.   PAST MEDICAL HISTORY:  Have you personally ever had or currently have any of the following?  If \"YES,\" then please provide more detail. No changes in my medical history.   HISTORY OF IMMUNOSUPPRESSION: Do you have a history of any of the following:  Systemic Immunosuppression such as Diabetes, Biologic or Immunotherapy, Chemotherapy, Organ Transplantation, Bone Marrow Transplantation or Prednisone?  No     Answering \"YES\" requires the addition of the dotphrase \"IMMUNOSUPPRESSED\" as the first diagnosis of the patient's visit.   FAMILY HISTORY:  Any \"first degree relatives\" (parent, brother, sister, or child) with the following?    No changes in my family's known health.   PATIENT EXPERIENCE:    If necessary, do we have your permission to call and leave a detailed message on your Preferred Phone number that includes your specific medical information?  Yes      Allergies[1] Current Medications[2]          Whom besides the patient is providing clinical information about today's encounter?   NO ADDITIONAL HISTORIAN (patient alone provided history)    Physical Exam and Assessment/Plan by Diagnosis:    ATOPIC DERMATITIS (\"ECZEMA\")    Physical Exam:  Anatomic Location: Face and Neck  Morphologic Description:  Eczematous papules/plaques  Body Surface Area at Today's Visit (patient's own palm = ~1% BSA): 5%  Global Assessment of Severity:  ALMOST CLEAR:  Barely perceptible erythema, barely perceptible induration/papulation, and/or minimal lichenification.  No oozing or crusting.  Pertinent Positives:  Pertinent Negatives:  Suspected SUPERINFECTION (erythema, oozing, and/or crusting is present)?: " "No    Additional History of Present Condition:  Patient last evaluated by Dr. Macedo on 3/29/23.  She has been on Dupixent 300 mg SQ every 2 weeks for a few years.  Reports significant improvement of skin and quality of life.  States last injection was 3.5 years ago.  Has current flare on neck and lower face.  States Triamcinolone was ineffective.         TODAY'S PLAN:     PRESCRIPTION MANAGEMENT:  We discussed that treatment often begins with topical steroids and topical calcineurin inhibitors; topical GAUDENCIO-inhibitors are emerging as potentially useful.  Systemic therapy with oral corticosteroids such as prednisone or GAUDENCIO-inhibitors or Dupixent (dupilumab) may also be indicated.  Side effects of these medications were discussed.    Skin Hygiene:      Recommend using only mild cleansers (hypoallergenic and without fragrances) and fragrance free detergent (not \"unscented\" products which contain a masking agent); we discussed avoiding irritants/fragranced products.  Encourage regular use of a humidifier to increase humidity and help prevent water loss.  At least 3 times day whole-body application using a good moisturizer such as Cerave.      Topical Management:      NONE      Intensive Therapy:      NONE      Systemic Strategies:      DUPIXENT  (Dupilumab)  ADULT PATIENT.  Dosage is NOT weight-based.  LOADING DOSE and MAINTENANCE DOSE are required.  ADULT MAINTENANCE DOSE:  300 mg.  Select Epic ORDER:  \"Dupixent 300mg/2mL SC SOPN\"  Select \"FREE TEXT\".  Dispense:  2 mL.  Refill:  25.  SIG:  MAINTENANCE DOSE:  Give one 300 mg injection EVERY 2 WEEKS as instructed.  Message sent to prior authorization as patient ran out of Dupixent  Prescription sent to pharmacy       Investigations: NONE     Follow up in 1 year for re-evaluation  Advised to call with any questions/concerns     MEDICAL DECISION MAKING  Treatment Goal:  Resolution of the CHRONIC condition.       Chronic condition is currently at treatment goal.      " "           [1] No Known Allergies  [2]   Current Outpatient Medications:     Dupixent subcutaneous injection, Inject 2 mL (300 mg total) under the skin every 14 (fourteen) days, Disp: 4 mL, Rfl: 12    ALPRAZolam (XANAX) 0.25 mg tablet, Take 1 tablet (0.25 mg total) by mouth 3 (three) times a day as needed for anxiety, Disp: 30 tablet, Rfl: 0    b complex vitamins capsule, Take 1 capsule by mouth in the morning., Disp: , Rfl:     B-D ALLERGY SYRINGE 1CC/28G 28G X 1/2\" 1 ML MISC, , Disp: , Rfl: 2    Butalbital-APAP-Caffeine (Fioricet) -40 MG CAPS, Take 1 tablet by mouth daily as needed (ha), Disp: 20 capsule, Rfl: 0    Cholecalciferol (VITAMIN D3) 2000 units capsule, Take 2 capsules by mouth in the morning., Disp: , Rfl:     citalopram (CeleXA) 40 mg tablet, Take 1 tablet (40 mg total) by mouth daily, Disp: 90 tablet, Rfl: 1    cyclobenzaprine (FLEXERIL) 10 mg tablet, Take 1 tablet (10 mg total) by mouth 3 (three) times a day as needed for muscle spasms, Disp: 45 tablet, Rfl: 0    docusate sodium (COLACE) 100 mg capsule, Take 1 capsule (100 mg total) by mouth 2 (two) times a day, Disp: 60 capsule, Rfl: 5    Dulera 100-5 MCG/ACT inhaler, Inhale 2 puffs 2 (two) times a day, Disp: 13 g, Rfl: 0    estradiol (ESTRACE) 1 mg tablet, TAKE 1 1/2 TABLETS BY MOUTH DAILY, Disp: 135 tablet, Rfl: 3    fluticasone (FLONASE) 50 mcg/act nasal spray, 2 sprays into each nostril every morning, Disp: 16 mL, Rfl: 0    INSULIN SYRINGE 1CC/29G 29G X 1/2\" 1 ML MISC, , Disp: , Rfl:     levocetirizine (XYZAL) 5 MG tablet, Take 1 tablet (5 mg total) by mouth every evening, Disp: 90 tablet, Rfl: 1    MAGNESIUM PO, Take 1 capsule by mouth in the morning., Disp: , Rfl:     ondansetron (ZOFRAN) 4 mg tablet, Take 1 tablet (4 mg total) by mouth every 8 (eight) hours as needed for nausea or vomiting, Disp: 20 tablet, Rfl: 0    polyethylene glycol (MIRALAX) 17 g packet, Take 17 g by mouth daily, Disp: 510 g, Rfl: 5    Progesterone 100 MG CAPS, " Take 100 mg by mouth at bedtime, Disp: 90 capsule, Rfl: 3    Prucalopride Succinate 2 MG TABS, Take 2 mg by mouth daily, Disp: 90 tablet, Rfl: 3    Restasis 0.05 % ophthalmic emulsion, Administer 1 drop to both eyes every 12 (twelve) hours, Disp: , Rfl:     Semaglutide-Weight Management (Wegovy) 1.7 MG/0.75ML, Inject 1.7 mg under the skin weekly, Disp: 3 mL, Rfl: 5    SUMAtriptan (Imitrex) 20 MG/ACT nasal spray, 1 spray (20 mg total) into each nostril if needed for migraine, Disp: 6 each, Rfl: 1    Tenapanor HCl (Ibsrela) 50 MG TABS, Take 50 mg by mouth 2 (two) times a day, Disp: 180 tablet, Rfl: 3    tobramycin-dexamethasone (TOBRADEX) ophthalmic suspension, Administer 1 drop to both eyes if needed, Disp: , Rfl:     triamcinolone (KENALOG) 0.1 % cream, Apply topically 2 (two) times a day Up to 2 weeks, take 1 week break, use as needed for flares.  Avoid face, groin, armpits, Disp: 80 g, Rfl: 1    TURMERIC PO, Take 1 capsule by mouth in the morning., Disp: , Rfl:     valACYclovir (VALTREX) 500 mg tablet, Take 1 tablet(500 mg total) by mouth every morning, Disp: 90 tablet, Rfl: 0    Ventolin  (90 Base) MCG/ACT inhaler, Inhale 2 puffs every 4 (four) hours as needed for wheezing, Disp: , Rfl:

## 2025-08-01 ENCOUNTER — OFFICE VISIT (OUTPATIENT)
Dept: FAMILY MEDICINE CLINIC | Facility: CLINIC | Age: 55
End: 2025-08-01
Payer: COMMERCIAL

## 2025-08-01 VITALS
HEIGHT: 65 IN | OXYGEN SATURATION: 98 % | TEMPERATURE: 98 F | SYSTOLIC BLOOD PRESSURE: 118 MMHG | WEIGHT: 148 LBS | HEART RATE: 77 BPM | RESPIRATION RATE: 17 BRPM | DIASTOLIC BLOOD PRESSURE: 80 MMHG | BODY MASS INDEX: 24.66 KG/M2

## 2025-08-01 DIAGNOSIS — E66.811 CLASS 1 OBESITY DUE TO EXCESS CALORIES WITH SERIOUS COMORBIDITY AND BODY MASS INDEX (BMI) OF 31.0 TO 31.9 IN ADULT: ICD-10-CM

## 2025-08-01 DIAGNOSIS — F32.A DEPRESSION, UNSPECIFIED DEPRESSION TYPE: ICD-10-CM

## 2025-08-01 DIAGNOSIS — F41.9 ANXIETY: ICD-10-CM

## 2025-08-01 DIAGNOSIS — E66.09 CLASS 1 OBESITY DUE TO EXCESS CALORIES WITH SERIOUS COMORBIDITY AND BODY MASS INDEX (BMI) OF 31.0 TO 31.9 IN ADULT: ICD-10-CM

## 2025-08-01 DIAGNOSIS — Z00.00 ANNUAL PHYSICAL EXAM: Primary | ICD-10-CM

## 2025-08-01 PROCEDURE — 99396 PREV VISIT EST AGE 40-64: CPT | Performed by: NURSE PRACTITIONER

## 2025-08-01 RX ORDER — SEMAGLUTIDE 1.7 MG/.75ML
INJECTION, SOLUTION SUBCUTANEOUS
Qty: 3 ML | Refills: 5 | Status: SHIPPED | OUTPATIENT
Start: 2025-08-01

## 2025-08-01 RX ORDER — CITALOPRAM HYDROBROMIDE 40 MG/1
40 TABLET ORAL DAILY
Qty: 90 TABLET | Refills: 3 | Status: SHIPPED | OUTPATIENT
Start: 2025-08-01

## 2025-08-01 RX ORDER — SEMAGLUTIDE 1.7 MG/.75ML
INJECTION, SOLUTION SUBCUTANEOUS
Qty: 3 ML | Refills: 5 | Status: SHIPPED | OUTPATIENT
Start: 2025-08-01 | End: 2025-08-01 | Stop reason: SDUPTHER

## 2025-08-03 DIAGNOSIS — J30.1 ALLERGIC RHINITIS DUE TO POLLEN, UNSPECIFIED SEASONALITY: ICD-10-CM

## 2025-08-05 RX ORDER — LEVOCETIRIZINE DIHYDROCHLORIDE 5 MG/1
5 TABLET, FILM COATED ORAL EVERY EVENING
Qty: 90 TABLET | Refills: 1 | Status: SHIPPED | OUTPATIENT
Start: 2025-08-05

## (undated) DEVICE — STERILE POLYISOPRENE POWDER-FREE SURGICAL GLOVES WITH EMOLLIENT COATING: Brand: PROTEXIS

## (undated) DEVICE — ELECTRODE NEEDLE MOD E-Z CLEAN 2.75IN 7CM -0013M

## (undated) DEVICE — DECANTER: Brand: UNBRANDED

## (undated) DEVICE — TIBURON SPLIT SHEET: Brand: CONVERTORS

## (undated) DEVICE — LIGHT GLOVE GREEN

## (undated) DEVICE — PENCIL ELECTROSURG E-Z CLEAN -0035H

## (undated) DEVICE — DRESSING TELFA 2 X 3 IN STRL

## (undated) DEVICE — BASIC PACK: Brand: CONVERTORS

## (undated) DEVICE — SUT PROLENE 6-0 P-3 18 IN 8695G

## (undated) DEVICE — SUT MONOCRYL PLUS 5-0 PC12 18 IN MCP834G

## (undated) DEVICE — SPONGE 4 X 4 XRAY 16 PLY STRL LF RFD

## (undated) DEVICE — TELFA NON-ADHERENT ABSORBENT DRESSING: Brand: TELFA

## (undated) DEVICE — BASIC SINGLE BASIN 2-LF: Brand: MEDLINE INDUSTRIES, INC.

## (undated) DEVICE — NEEDLE 30 G X 1/2

## (undated) DEVICE — SKIN MARKER DUAL TIP WITH RULER CAP, FLEXIBLE RULER AND LABELS: Brand: DEVON

## (undated) DEVICE — 1820 FOAM BLOCK NEEDLE COUNTER: Brand: DEVON

## (undated) DEVICE — POV-IOD SOLUTION 4OZ BT

## (undated) DEVICE — STERILE POLYISOPRENE POWDER-FREE SURGICAL GLOVES: Brand: PROTEXIS

## (undated) DEVICE — PREMIUM DRY TRAY LF: Brand: MEDLINE INDUSTRIES, INC.

## (undated) DEVICE — INTENDED FOR TISSUE SEPARATION, AND OTHER PROCEDURES THAT REQUIRE A SHARP SURGICAL BLADE TO PUNCTURE OR CUT.: Brand: BARD-PARKER ® SAFETYLOCK CARBON RIB-BACK BLADES

## (undated) DEVICE — DISPOSABLE OR TOWEL: Brand: CARDINAL HEALTH

## (undated) DEVICE — SCD SEQUENTIAL COMPRESSION COMFORT SLEEVE MEDIUM KNEE LENGTH: Brand: KENDALL SCD

## (undated) DEVICE — ICE PACK EYE

## (undated) DEVICE — CORNEAL PROTECTOR ADULT

## (undated) DEVICE — COTTON TIP APPLICTOR 2 PK

## (undated) DEVICE — SYRINGE 10ML LL